# Patient Record
Sex: FEMALE | Race: WHITE | NOT HISPANIC OR LATINO | Employment: OTHER | URBAN - NONMETROPOLITAN AREA
[De-identification: names, ages, dates, MRNs, and addresses within clinical notes are randomized per-mention and may not be internally consistent; named-entity substitution may affect disease eponyms.]

---

## 2023-03-13 ENCOUNTER — OFFICE VISIT (OUTPATIENT)
Dept: PRIMARY CARE | Facility: CLINIC | Age: 78
End: 2023-03-13
Payer: MEDICARE

## 2023-03-13 VITALS
RESPIRATION RATE: 26 BRPM | HEART RATE: 72 BPM | HEIGHT: 63 IN | WEIGHT: 131.6 LBS | BODY MASS INDEX: 23.32 KG/M2 | DIASTOLIC BLOOD PRESSURE: 70 MMHG | OXYGEN SATURATION: 98 % | SYSTOLIC BLOOD PRESSURE: 136 MMHG

## 2023-03-13 DIAGNOSIS — M54.12 CERVICAL RADICULOPATHY: Primary | ICD-10-CM

## 2023-03-13 DIAGNOSIS — R53.83 OTHER FATIGUE: ICD-10-CM

## 2023-03-13 DIAGNOSIS — E78.5 DYSLIPIDEMIA: ICD-10-CM

## 2023-03-13 DIAGNOSIS — Z13.9 SCREENING FOR CONDITION: ICD-10-CM

## 2023-03-13 DIAGNOSIS — K21.9 GASTROESOPHAGEAL REFLUX DISEASE, UNSPECIFIED WHETHER ESOPHAGITIS PRESENT: ICD-10-CM

## 2023-03-13 DIAGNOSIS — E55.9 VITAMIN D DEFICIENCY: ICD-10-CM

## 2023-03-13 DIAGNOSIS — J45.20 MILD INTERMITTENT ASTHMA WITHOUT COMPLICATION (HHS-HCC): ICD-10-CM

## 2023-03-13 DIAGNOSIS — D51.0 PERNICIOUS ANEMIA: ICD-10-CM

## 2023-03-13 DIAGNOSIS — E11.9 TYPE 2 DIABETES MELLITUS WITHOUT COMPLICATION, WITHOUT LONG-TERM CURRENT USE OF INSULIN (MULTI): ICD-10-CM

## 2023-03-13 PROCEDURE — 1036F TOBACCO NON-USER: CPT | Performed by: FAMILY MEDICINE

## 2023-03-13 PROCEDURE — 99214 OFFICE O/P EST MOD 30 MIN: CPT | Performed by: FAMILY MEDICINE

## 2023-03-13 PROCEDURE — 1160F RVW MEDS BY RX/DR IN RCRD: CPT | Performed by: FAMILY MEDICINE

## 2023-03-13 PROCEDURE — 1159F MED LIST DOCD IN RCRD: CPT | Performed by: FAMILY MEDICINE

## 2023-03-13 RX ORDER — CLOPIDOGREL BISULFATE 75 MG/1
1 TABLET ORAL DAILY
COMMUNITY
Start: 2021-12-13 | End: 2023-03-13

## 2023-03-13 RX ORDER — CYANOCOBALAMIN 1000 UG/ML
1000 INJECTION, SOLUTION INTRAMUSCULAR; SUBCUTANEOUS
COMMUNITY
End: 2023-03-13

## 2023-03-13 RX ORDER — METOPROLOL SUCCINATE 50 MG/1
1 TABLET, EXTENDED RELEASE ORAL DAILY
COMMUNITY
End: 2023-07-26 | Stop reason: WASHOUT

## 2023-03-13 RX ORDER — BENZONATATE 200 MG/1
200 CAPSULE ORAL 3 TIMES DAILY PRN
COMMUNITY
Start: 2022-04-29 | End: 2023-03-13

## 2023-03-13 RX ORDER — LANOLIN ALCOHOL/MO/W.PET/CERES
1 CREAM (GRAM) TOPICAL DAILY
COMMUNITY
End: 2023-03-13

## 2023-03-13 RX ORDER — OMEPRAZOLE 40 MG/1
40 CAPSULE, DELAYED RELEASE ORAL
COMMUNITY
End: 2023-03-13

## 2023-03-13 RX ORDER — SERTRALINE HYDROCHLORIDE 50 MG/1
100 TABLET, FILM COATED ORAL
COMMUNITY
Start: 2020-06-18 | End: 2023-04-25

## 2023-03-13 RX ORDER — BUSPIRONE HYDROCHLORIDE 7.5 MG/1
7.5 TABLET ORAL 2 TIMES DAILY
COMMUNITY
Start: 2022-04-29 | End: 2023-07-26 | Stop reason: WASHOUT

## 2023-03-13 RX ORDER — ISOSORBIDE MONONITRATE 30 MG/1
1 TABLET, EXTENDED RELEASE ORAL DAILY
COMMUNITY
Start: 2021-12-08 | End: 2024-03-27

## 2023-03-13 RX ORDER — LOSARTAN POTASSIUM 50 MG/1
1 TABLET ORAL DAILY
COMMUNITY
End: 2023-11-09 | Stop reason: ALTCHOICE

## 2023-03-13 RX ORDER — ALBUTEROL SULFATE 90 UG/1
2 AEROSOL, METERED RESPIRATORY (INHALATION) EVERY 4 HOURS PRN
COMMUNITY
End: 2023-03-13

## 2023-03-13 RX ORDER — CYANOCOBALAMIN 1000 UG/ML
INJECTION, SOLUTION INTRAMUSCULAR; SUBCUTANEOUS
Qty: 1 ML | Refills: 1 | Status: SHIPPED | OUTPATIENT
Start: 2023-03-13 | End: 2023-04-05

## 2023-03-13 RX ORDER — NITROGLYCERIN 0.4 MG/1
0.4 TABLET SUBLINGUAL EVERY 5 MIN PRN
COMMUNITY
End: 2024-05-16 | Stop reason: SDUPTHER

## 2023-03-13 RX ORDER — MECLIZINE HYDROCHLORIDE 25 MG/1
25 TABLET ORAL 3 TIMES DAILY PRN
COMMUNITY
Start: 2022-06-13 | End: 2023-07-26 | Stop reason: WASHOUT

## 2023-03-13 RX ORDER — ALBUTEROL SULFATE 90 UG/1
2 AEROSOL, METERED RESPIRATORY (INHALATION) EVERY 4 HOURS PRN
Qty: 18 G | Refills: 0 | Status: SHIPPED | OUTPATIENT
Start: 2023-03-13 | End: 2023-07-26 | Stop reason: SDUPTHER

## 2023-03-13 RX ORDER — OMEPRAZOLE 40 MG/1
40 CAPSULE, DELAYED RELEASE ORAL
Qty: 30 CAPSULE | Refills: 1 | Status: SHIPPED | OUTPATIENT
Start: 2023-03-13 | End: 2023-07-26 | Stop reason: WASHOUT

## 2023-03-13 RX ORDER — HYDRALAZINE HYDROCHLORIDE 50 MG/1
50 TABLET, FILM COATED ORAL 2 TIMES DAILY
COMMUNITY
Start: 2023-02-28 | End: 2023-12-18

## 2023-03-13 RX ORDER — EZETIMIBE 10 MG/1
10 TABLET ORAL DAILY
COMMUNITY
End: 2023-07-26 | Stop reason: WASHOUT

## 2023-03-13 RX ORDER — HYDROCHLOROTHIAZIDE 25 MG/1
1 TABLET ORAL DAILY
COMMUNITY
End: 2023-07-26 | Stop reason: WASHOUT

## 2023-03-13 RX ORDER — EVOLOCUMAB 140 MG/ML
140 INJECTION, SOLUTION SUBCUTANEOUS
COMMUNITY
Start: 2020-08-28 | End: 2023-11-09

## 2023-03-13 RX ORDER — HYDROXYZINE HYDROCHLORIDE 10 MG/1
10 TABLET, FILM COATED ORAL 4 TIMES DAILY
COMMUNITY
Start: 2022-12-19 | End: 2023-07-26 | Stop reason: WASHOUT

## 2023-03-13 RX ORDER — OMEPRAZOLE 40 MG/1
40 CAPSULE, DELAYED RELEASE ORAL
Qty: 30 CAPSULE | Refills: 1 | Status: SHIPPED | OUTPATIENT
Start: 2023-03-13 | End: 2023-03-13 | Stop reason: SDUPTHER

## 2023-03-13 RX ORDER — METFORMIN HYDROCHLORIDE 500 MG/1
500 TABLET ORAL
COMMUNITY
Start: 2017-06-29 | End: 2023-05-16 | Stop reason: SDUPTHER

## 2023-03-13 RX ORDER — GLIMEPIRIDE 2 MG/1
2 TABLET ORAL 2 TIMES DAILY
COMMUNITY
End: 2023-07-26 | Stop reason: WASHOUT

## 2023-03-13 RX ORDER — ALBUTEROL SULFATE 0.63 MG/3ML
3 SOLUTION RESPIRATORY (INHALATION) EVERY 6 HOURS PRN
COMMUNITY
Start: 2020-10-06 | End: 2023-03-13

## 2023-03-13 RX ORDER — CYANOCOBALAMIN 1000 UG/ML
INJECTION, SOLUTION INTRAMUSCULAR; SUBCUTANEOUS
Qty: 1 ML | Refills: 1 | Status: SHIPPED | OUTPATIENT
Start: 2023-03-13 | End: 2023-03-13 | Stop reason: SDUPTHER

## 2023-03-13 RX ORDER — METOPROLOL SUCCINATE 100 MG/1
1 TABLET, EXTENDED RELEASE ORAL DAILY
COMMUNITY
Start: 2017-10-06 | End: 2023-12-18

## 2023-03-13 RX ORDER — AMLODIPINE BESYLATE 5 MG/1
5 TABLET ORAL DAILY
COMMUNITY
Start: 2023-03-07 | End: 2023-12-18

## 2023-03-13 RX ORDER — ACETAMINOPHEN 325 MG/1
325 TABLET ORAL EVERY 8 HOURS PRN
COMMUNITY
Start: 2020-10-31 | End: 2023-11-09 | Stop reason: ALTCHOICE

## 2023-03-13 RX ORDER — SYRINGE WITH NEEDLE, 1 ML 25GX5/8"
SYRINGE, EMPTY DISPOSABLE MISCELLANEOUS
COMMUNITY
Start: 2022-10-10

## 2023-03-13 RX ORDER — ERGOCALCIFEROL 1.25 MG/1
1 CAPSULE ORAL
COMMUNITY
End: 2023-07-26 | Stop reason: SDUPTHER

## 2023-03-13 RX ORDER — TRAMADOL HYDROCHLORIDE 50 MG/1
50 TABLET ORAL EVERY 6 HOURS PRN
COMMUNITY
Start: 2022-12-19 | End: 2023-07-26 | Stop reason: WASHOUT

## 2023-03-13 ASSESSMENT — PAIN SCALES - GENERAL: PAINLEVEL: 4

## 2023-03-13 NOTE — PROGRESS NOTES
"Subjective     Janine Hendrix is a 77 y.o. female who presents for Follow-up (Follow up meds/rash).  Today she is accompanied by accompanied by her daughter.     HPI  The patient is a 77 year-old female presenting to the clinic for rash and to follow up on medication.    Complaining pain in the neck on pain scale 8-9/10.  Squeezing throbbing pain.  Over-the-counter medications not helping.  Feeling sensation in the arms.  Tingling numbness sensation going into the arms from the neck.  Denies trauma.  Denies injury.  Denies blurred vision.  Educated on type 2 diabetes and diet exercise.  Complaining feeling tired.  Advised on diet exercise.  Educated on dyslipidemia and diet exercise.  Educated on vitamin D deficiency we will continue monitor.  Educated on asthma care and prevention treatment and management.        Review of Systems  Review of systems  General.  Denies fever.  Denies chills.  HEENT denies nasal congestion.  Denies sinus pressure.  Respiratory.  Denies cough.  Denies shortness of breath.  Neck.  Complain neck pain.  Cardiovascular.  Denies chest pain.  Denies heart palpitations.  Denies shortness of breath.    Gastrointestinal.  Denies nausea vomiting diarrhea.  Denies abdominal pain.    Genitourinary denies burning urination.  Denies frequent urination.  Denies flank pain.  Denies blood in the urine.  Denies abnormal vaginal discharge.    Neurology.  Dictated as above.  Musculoskeletal.  Denies body aches.  Denies joint pains.  Denies muscle aches.  Denies muscle weakness    Endocrinology.  Denies cold intolerance.  Denies hot intolerance.    Psychiatric.  Denies depression.  Denies anxiety.  Denies suicidal.  Denies homicidal.          Objective   /70 (BP Location: Right arm, Patient Position: Sitting, BP Cuff Size: Adult)   Pulse 72   Resp 26   Ht 1.6 m (5' 3\")   Wt 59.7 kg (131 lb 9.6 oz)   SpO2 98%   BMI 23.31 kg/m²        Physical Exam  General.  Not in distress.  HEENT " normocephalic anicteric sclerae.  Neck soft supple no thyromegaly.  No carotid bruit.  Tenderness noted on lower cervical paraspinal muscular area.         Lungs are clear.  Heart regular.  Abdomen soft nontender nondistended bowel sounds are positive.  Extremities no clubbing cyanosis or edema.  Psychiatric.  Has good eye contact.  No crying spells noted.  Speech was normal.  Denies depression.  Denies suicidal.  Denies homicidal.  Neurological.  Complaining tingling numbness and shocklike sensation in the arms  Foot exam.  Bilateral foot exam.  No rashes noted.  No lesions noted.  No ulcers noted.  No onychomycosis noted.  Sensation was intact with a monofilament.  Bilateral posterior tibial and dorsalis pedis arteries are palpable     Assessment/Plan     1.  Cervical radiculopathy.  Educated on neck exercises.  Recommended MRI.    2.  Type 2 diabetes.  Educated on diet exercise.  Advised to check blood sugars at directed.  Recommend eye exam once a year.  Educated on diabetic foot care.    3.  Fatigue.  Educated on diet exercise.      4.  Dyslipidemia.  Educated on diet exercise.  We will continue monitor.    5.  Vitamin D deficiency.  Educated on vitamin D deficiency.  We will continue monitor.    6.  Acid reflux.  Educated on acid reflux and heartburn prevention.  Denies nausea vomiting and diarrhea  7.  Pernicious anemia.  Educated on pernicious anemia.  Continue B12 injection.      8.  Asthma.  Educated on asthma care and prevention treatment and management.  Patient and patient's daughter had multiple questions and concerns    Addressed all the questions and concerns.          Problem List Items Addressed This Visit    None  Visit Diagnoses       Cervical radiculopathy    -  Primary    Relevant Orders    MR cervical spine w and wo IV contrast    Type 2 diabetes mellitus without complication, without long-term current use of insulin (CMS/[facility])        Relevant Orders    Creatinine, Serum    CBC and Auto  Differential    Hemoglobin A1C    Albumin , Urine Random    Other fatigue        Relevant Orders    Comprehensive metabolic panel    Urinalysis with Reflex Microscopic    Tsh With Reflex To Free T4 If Abnormal    Vitamin B12    Folate    Dyslipidemia        Relevant Orders    Lipid panel    Vitamin D deficiency        Relevant Orders    Vitamin D 25-Hydroxy,Total    Gastroesophageal reflux disease, unspecified whether esophagitis present        Relevant Medications    omeprazole (PriLOSEC) 40 mg DR capsule    Pernicious anemia        Relevant Medications    cyanocobalamin (Vitamin B-12) 1,000 mcg/mL injection    Screening for condition        Relevant Orders    Hepatitis C antibody    Mild intermittent asthma without complication        Relevant Medications    albuterol 90 mcg/actuation inhaler        Scribe Attestation  By signing my name below, IMadison Scribe   attest that this documentation has been prepared under the direction and in the presence of Bishnu Baron MD.

## 2023-03-20 ENCOUNTER — LAB (OUTPATIENT)
Dept: LAB | Facility: LAB | Age: 78
End: 2023-03-20
Payer: MEDICARE

## 2023-03-20 DIAGNOSIS — R53.83 OTHER FATIGUE: ICD-10-CM

## 2023-03-20 DIAGNOSIS — E11.9 TYPE 2 DIABETES MELLITUS WITHOUT COMPLICATION, WITHOUT LONG-TERM CURRENT USE OF INSULIN (MULTI): ICD-10-CM

## 2023-03-20 DIAGNOSIS — Z13.9 SCREENING FOR CONDITION: ICD-10-CM

## 2023-03-20 DIAGNOSIS — E78.5 DYSLIPIDEMIA: ICD-10-CM

## 2023-03-20 DIAGNOSIS — E55.9 VITAMIN D DEFICIENCY: ICD-10-CM

## 2023-03-20 LAB
ALANINE AMINOTRANSFERASE (SGPT) (U/L) IN SER/PLAS: 17 U/L (ref 7–45)
ALBUMIN (G/DL) IN SER/PLAS: 4.4 G/DL (ref 3.4–5)
ALKALINE PHOSPHATASE (U/L) IN SER/PLAS: 62 U/L (ref 33–136)
ANION GAP IN SER/PLAS: 15 MMOL/L (ref 10–20)
APPEARANCE, URINE: ABNORMAL
ASPARTATE AMINOTRANSFERASE (SGOT) (U/L) IN SER/PLAS: 21 U/L (ref 9–39)
BACTERIA, URINE: ABNORMAL /HPF
BASOPHILS (10*3/UL) IN BLOOD BY AUTOMATED COUNT: 0.05 X10E9/L (ref 0–0.1)
BASOPHILS/100 LEUKOCYTES IN BLOOD BY AUTOMATED COUNT: 1 % (ref 0–2)
BILIRUBIN TOTAL (MG/DL) IN SER/PLAS: 0.5 MG/DL (ref 0–1.2)
BILIRUBIN, URINE: NEGATIVE
BLOOD, URINE: NEGATIVE
BUDDING YEAST, URINE: PRESENT /HPF
CALCIUM (MG/DL) IN SER/PLAS: 9.1 MG/DL (ref 8.6–10.3)
CARBON DIOXIDE, TOTAL (MMOL/L) IN SER/PLAS: 27 MMOL/L (ref 21–32)
CHLORIDE (MMOL/L) IN SER/PLAS: 100 MMOL/L (ref 98–107)
CHOLESTEROL (MG/DL) IN SER/PLAS: 149 MG/DL (ref 0–199)
CHOLESTEROL IN HDL (MG/DL) IN SER/PLAS: 68.4 MG/DL
CHOLESTEROL/HDL RATIO: 2.2
COLOR, URINE: ABNORMAL
CREATININE (MG/DL) IN SER/PLAS: 0.95 MG/DL (ref 0.5–1.05)
EOSINOPHILS (10*3/UL) IN BLOOD BY AUTOMATED COUNT: 0.26 X10E9/L (ref 0–0.4)
EOSINOPHILS/100 LEUKOCYTES IN BLOOD BY AUTOMATED COUNT: 5 % (ref 0–6)
ERYTHROCYTE DISTRIBUTION WIDTH (RATIO) BY AUTOMATED COUNT: 12.7 % (ref 11.5–14.5)
ERYTHROCYTE MEAN CORPUSCULAR HEMOGLOBIN CONCENTRATION (G/DL) BY AUTOMATED: 33.2 G/DL (ref 32–36)
ERYTHROCYTE MEAN CORPUSCULAR VOLUME (FL) BY AUTOMATED COUNT: 90 FL (ref 80–100)
ERYTHROCYTES (10*6/UL) IN BLOOD BY AUTOMATED COUNT: 4.65 X10E12/L (ref 4–5.2)
GFR FEMALE: 62 ML/MIN/1.73M2
GLUCOSE (MG/DL) IN SER/PLAS: 148 MG/DL (ref 74–99)
GLUCOSE, URINE: NEGATIVE MG/DL
HEMATOCRIT (%) IN BLOOD BY AUTOMATED COUNT: 41.9 % (ref 36–46)
HEMOGLOBIN (G/DL) IN BLOOD: 13.9 G/DL (ref 12–16)
IMMATURE GRANULOCYTES/100 LEUKOCYTES IN BLOOD BY AUTOMATED COUNT: 0.2 % (ref 0–0.9)
KETONES, URINE: NEGATIVE MG/DL
LDL: 48 MG/DL (ref 0–99)
LEUKOCYTE ESTERASE, URINE: ABNORMAL
LEUKOCYTES (10*3/UL) IN BLOOD BY AUTOMATED COUNT: 5.2 X10E9/L (ref 4.4–11.3)
LYMPHOCYTES (10*3/UL) IN BLOOD BY AUTOMATED COUNT: 1.88 X10E9/L (ref 0.8–3)
LYMPHOCYTES/100 LEUKOCYTES IN BLOOD BY AUTOMATED COUNT: 36.3 % (ref 13–44)
MONOCYTES (10*3/UL) IN BLOOD BY AUTOMATED COUNT: 0.37 X10E9/L (ref 0.05–0.8)
MONOCYTES/100 LEUKOCYTES IN BLOOD BY AUTOMATED COUNT: 7.1 % (ref 2–10)
MUCUS, URINE: ABNORMAL /LPF
NEUTROPHILS (10*3/UL) IN BLOOD BY AUTOMATED COUNT: 2.61 X10E9/L (ref 1.6–5.5)
NEUTROPHILS/100 LEUKOCYTES IN BLOOD BY AUTOMATED COUNT: 50.4 % (ref 40–80)
NITRITE, URINE: NEGATIVE
PH, URINE: 5 (ref 5–8)
PLATELETS (10*3/UL) IN BLOOD AUTOMATED COUNT: 185 X10E9/L (ref 150–450)
POTASSIUM (MMOL/L) IN SER/PLAS: 4.2 MMOL/L (ref 3.5–5.3)
PROTEIN TOTAL: 6.6 G/DL (ref 6.4–8.2)
PROTEIN, URINE: NEGATIVE MG/DL
RBC, URINE: 1 /HPF (ref 0–5)
SODIUM (MMOL/L) IN SER/PLAS: 138 MMOL/L (ref 136–145)
SPECIFIC GRAVITY, URINE: 1.02 (ref 1–1.03)
SQUAMOUS EPITHELIAL CELLS, URINE: 12 /HPF
THYROTROPIN (MIU/L) IN SER/PLAS BY DETECTION LIMIT <= 0.05 MIU/L: 2.28 MIU/L (ref 0.44–3.98)
TRIGLYCERIDE (MG/DL) IN SER/PLAS: 164 MG/DL (ref 0–149)
UREA NITROGEN (MG/DL) IN SER/PLAS: 28 MG/DL (ref 6–23)
UROBILINOGEN, URINE: <2 MG/DL (ref 0–1.9)
VLDL: 33 MG/DL (ref 0–40)
WBC, URINE: 4 /HPF (ref 0–5)

## 2023-03-20 PROCEDURE — 80053 COMPREHEN METABOLIC PANEL: CPT

## 2023-03-20 PROCEDURE — 81001 URINALYSIS AUTO W/SCOPE: CPT

## 2023-03-20 PROCEDURE — 82607 VITAMIN B-12: CPT

## 2023-03-20 PROCEDURE — 82746 ASSAY OF FOLIC ACID SERUM: CPT

## 2023-03-20 PROCEDURE — 86803 HEPATITIS C AB TEST: CPT

## 2023-03-20 PROCEDURE — 85025 COMPLETE CBC W/AUTO DIFF WBC: CPT

## 2023-03-20 PROCEDURE — 84443 ASSAY THYROID STIM HORMONE: CPT

## 2023-03-20 PROCEDURE — 36415 COLL VENOUS BLD VENIPUNCTURE: CPT

## 2023-03-20 PROCEDURE — 83036 HEMOGLOBIN GLYCOSYLATED A1C: CPT

## 2023-03-20 PROCEDURE — 82306 VITAMIN D 25 HYDROXY: CPT

## 2023-03-20 PROCEDURE — 80061 LIPID PANEL: CPT

## 2023-03-21 LAB
CALCIDIOL (25 OH VITAMIN D3) (NG/ML) IN SER/PLAS: 44 NG/ML
COBALAMIN (VITAMIN B12) (PG/ML) IN SER/PLAS: 864 PG/ML (ref 211–911)
ESTIMATED AVERAGE GLUCOSE FOR HBA1C: 171 MG/DL
FOLATE (NG/ML) IN SER/PLAS: 21.1 NG/ML
HEMOGLOBIN A1C/HEMOGLOBIN TOTAL IN BLOOD: 7.6 %
HEPATITIS C VIRUS AB PRESENCE IN SERUM: NONREACTIVE

## 2023-03-25 ENCOUNTER — TELEPHONE (OUTPATIENT)
Dept: PRIMARY CARE | Facility: CLINIC | Age: 78
End: 2023-03-25
Payer: MEDICARE

## 2023-03-25 DIAGNOSIS — M54.2 NECK PAIN: Primary | ICD-10-CM

## 2023-03-25 RX ORDER — METHYLPREDNISOLONE 4 MG/1
TABLET ORAL
Qty: 21 TABLET | Refills: 0 | Status: SHIPPED | OUTPATIENT
Start: 2023-03-25 | End: 2023-04-01

## 2023-03-25 NOTE — TELEPHONE ENCOUNTER
Discussed the patient and patient's daughter.  Still experiencing pain in the neck.  Reviewed MRI results.  Recommended neurosurgery consultation.  Discussed Medrol Dosepak and sent prescription.  Extreme caution advised taking Medrol Dosepak and elevated blood sugars.  Advised to go to the emergency room as soon as possible if the blood sugars goes up to 400 and over.  Patient and patient daughter understood verbalized understanding and agreed

## 2023-04-04 PROBLEM — D49.2 ABNORMAL SKIN GROWTH: Status: ACTIVE | Noted: 2023-04-04

## 2023-04-04 PROBLEM — R09.89 SENSATION, CHOKING: Status: ACTIVE | Noted: 2023-04-04

## 2023-04-04 PROBLEM — R53.83 OTHER FATIGUE: Status: ACTIVE | Noted: 2023-04-04

## 2023-04-04 PROBLEM — R79.89 ELEVATED TSH: Status: ACTIVE | Noted: 2023-04-04

## 2023-04-04 PROBLEM — E55.9 VITAMIN D DEFICIENCY: Status: ACTIVE | Noted: 2023-04-04

## 2023-04-04 PROBLEM — S75.001A INJURY OF RIGHT COMMON FEMORAL ARTERY: Status: ACTIVE | Noted: 2023-04-04

## 2023-04-04 PROBLEM — M25.552 LEFT HIP PAIN: Status: ACTIVE | Noted: 2023-04-04

## 2023-04-04 PROBLEM — I73.9 RIGHT LEG CLAUDICATION (CMS-HCC): Status: ACTIVE | Noted: 2023-04-04

## 2023-04-04 PROBLEM — I65.23 BILATERAL CAROTID ARTERY STENOSIS: Status: ACTIVE | Noted: 2023-04-04

## 2023-04-04 PROBLEM — K22.5 ZENKER'S DIVERTICULUM: Status: ACTIVE | Noted: 2023-04-04

## 2023-04-04 PROBLEM — E87.6 HYPOKALEMIA: Status: ACTIVE | Noted: 2023-04-04

## 2023-04-04 PROBLEM — R13.12 OROPHARYNGEAL DYSPHAGIA: Status: ACTIVE | Noted: 2023-04-04

## 2023-04-04 PROBLEM — R20.0 NUMBNESS: Status: ACTIVE | Noted: 2023-04-04

## 2023-04-04 PROBLEM — M54.50 CHRONIC LOW BACK PAIN: Status: ACTIVE | Noted: 2023-04-04

## 2023-04-04 PROBLEM — D51.0 PERNICIOUS ANEMIA: Status: ACTIVE | Noted: 2023-04-04

## 2023-04-04 PROBLEM — F41.0 PANIC ATTACKS: Status: ACTIVE | Noted: 2023-04-04

## 2023-04-04 PROBLEM — D64.9 ANEMIA: Status: ACTIVE | Noted: 2023-04-04

## 2023-04-04 PROBLEM — I73.9 PAD (PERIPHERAL ARTERY DISEASE) (CMS-HCC): Status: ACTIVE | Noted: 2023-04-04

## 2023-04-04 PROBLEM — G89.29 CHRONIC RIGHT HIP PAIN: Status: ACTIVE | Noted: 2023-04-04

## 2023-04-04 PROBLEM — I25.10 CORONARY ARTERY DISEASE INVOLVING NATIVE CORONARY ARTERY OF NATIVE HEART WITHOUT ANGINA PECTORIS: Status: ACTIVE | Noted: 2023-04-04

## 2023-04-04 PROBLEM — M79.602 PAIN OF LEFT UPPER EXTREMITY: Status: ACTIVE | Noted: 2023-04-04

## 2023-04-04 PROBLEM — M25.551 CHRONIC RIGHT HIP PAIN: Status: ACTIVE | Noted: 2023-04-04

## 2023-04-04 PROBLEM — K21.9 GERD (GASTROESOPHAGEAL REFLUX DISEASE): Status: ACTIVE | Noted: 2023-04-04

## 2023-04-04 PROBLEM — R53.82 CHRONIC FATIGUE AND MALAISE: Status: ACTIVE | Noted: 2023-04-04

## 2023-04-04 PROBLEM — E66.3 OVERWEIGHT: Status: ACTIVE | Noted: 2023-04-04

## 2023-04-04 PROBLEM — C44.92 SCC (SQUAMOUS CELL CARCINOMA): Status: ACTIVE | Noted: 2023-04-04

## 2023-04-04 PROBLEM — I74.5: Status: ACTIVE | Noted: 2023-04-04

## 2023-04-04 PROBLEM — J30.9 ALLERGIC RHINITIS: Status: ACTIVE | Noted: 2023-04-04

## 2023-04-04 PROBLEM — F03.90 DEMENTIA WITHOUT BEHAVIORAL DISTURBANCE (MULTI): Status: ACTIVE | Noted: 2023-04-04

## 2023-04-04 PROBLEM — L98.9 CHANGING SKIN LESION: Status: ACTIVE | Noted: 2023-04-04

## 2023-04-04 PROBLEM — M25.551 HIP PAIN, RIGHT: Status: ACTIVE | Noted: 2023-04-04

## 2023-04-04 PROBLEM — I10 ESSENTIAL HYPERTENSION: Status: ACTIVE | Noted: 2023-04-04

## 2023-04-04 PROBLEM — T17.208A OROPHARYNGEAL ASPIRATION: Status: ACTIVE | Noted: 2023-04-04

## 2023-04-04 PROBLEM — E11.9 TYPE 2 DIABETES MELLITUS WITHOUT COMPLICATION, WITHOUT LONG-TERM CURRENT USE OF INSULIN (MULTI): Status: ACTIVE | Noted: 2023-04-04

## 2023-04-04 PROBLEM — E11.9 DM2 (DIABETES MELLITUS, TYPE 2) (MULTI): Status: ACTIVE | Noted: 2023-04-04

## 2023-04-04 PROBLEM — I97.88: Status: ACTIVE | Noted: 2023-04-04

## 2023-04-04 PROBLEM — G89.29 CHRONIC LOW BACK PAIN: Status: ACTIVE | Noted: 2023-04-04

## 2023-04-04 PROBLEM — M79.601 PAIN OF RIGHT UPPER EXTREMITY: Status: ACTIVE | Noted: 2023-04-04

## 2023-04-04 PROBLEM — R13.13 DYSPHAGIA, CRICOPHARYNGEAL: Status: ACTIVE | Noted: 2023-04-04

## 2023-04-04 PROBLEM — F41.8 ANXIETY WITH DEPRESSION: Status: ACTIVE | Noted: 2023-04-04

## 2023-04-04 PROBLEM — H81.10 BPPV (BENIGN PAROXYSMAL POSITIONAL VERTIGO): Status: ACTIVE | Noted: 2023-04-04

## 2023-04-04 PROBLEM — Z90.12 HISTORY OF TOTAL MASTECTOMY OF LEFT BREAST: Status: ACTIVE | Noted: 2023-04-04

## 2023-04-04 PROBLEM — R53.81 CHRONIC FATIGUE AND MALAISE: Status: ACTIVE | Noted: 2023-04-04

## 2023-04-04 PROBLEM — Z95.5 HISTORY OF HEART ARTERY STENT: Status: ACTIVE | Noted: 2023-04-04

## 2023-04-04 PROBLEM — G47.00 INSOMNIA: Status: ACTIVE | Noted: 2023-04-04

## 2023-04-04 PROBLEM — M54.2 NECK PAIN: Status: ACTIVE | Noted: 2023-04-04

## 2023-04-04 PROBLEM — E78.5 DYSLIPIDEMIA: Status: ACTIVE | Noted: 2023-04-04

## 2023-04-04 PROBLEM — J69.0 ASPIRATION PNEUMONIA (MULTI): Status: ACTIVE | Noted: 2023-04-04

## 2023-04-04 PROBLEM — E83.42 HYPOMAGNESEMIA: Status: ACTIVE | Noted: 2023-04-04

## 2023-04-04 PROBLEM — M54.50 LOW BACK PAIN: Status: ACTIVE | Noted: 2023-04-04

## 2023-04-04 PROBLEM — R07.9 CHEST PAIN: Status: ACTIVE | Noted: 2023-04-04

## 2023-04-04 RX ORDER — SYRINGE, DISPOSABLE, 1 ML
SYRINGE, EMPTY DISPOSABLE MISCELLANEOUS
COMMUNITY

## 2023-04-04 RX ORDER — ASPIRIN 81 MG/1
81 TABLET ORAL DAILY
COMMUNITY

## 2023-04-05 ENCOUNTER — OFFICE VISIT (OUTPATIENT)
Dept: PRIMARY CARE | Facility: CLINIC | Age: 78
End: 2023-04-05
Payer: MEDICARE

## 2023-04-05 VITALS
SYSTOLIC BLOOD PRESSURE: 114 MMHG | DIASTOLIC BLOOD PRESSURE: 67 MMHG | HEIGHT: 63 IN | OXYGEN SATURATION: 99 % | HEART RATE: 84 BPM | WEIGHT: 126.4 LBS | BODY MASS INDEX: 22.39 KG/M2

## 2023-04-05 DIAGNOSIS — F41.0 PANIC ATTACKS: ICD-10-CM

## 2023-04-05 DIAGNOSIS — M25.59 PAIN IN OTHER JOINT: ICD-10-CM

## 2023-04-05 DIAGNOSIS — I83.813 VARICOSE VEINS OF BOTH LOWER EXTREMITIES WITH PAIN: ICD-10-CM

## 2023-04-05 DIAGNOSIS — M48.02 SPINAL STENOSIS OF CERVICAL REGION: ICD-10-CM

## 2023-04-05 DIAGNOSIS — E11.9 TYPE 2 DIABETES MELLITUS WITHOUT COMPLICATION, WITHOUT LONG-TERM CURRENT USE OF INSULIN (MULTI): ICD-10-CM

## 2023-04-05 DIAGNOSIS — I10 ESSENTIAL HYPERTENSION: ICD-10-CM

## 2023-04-05 DIAGNOSIS — E78.5 DYSLIPIDEMIA: ICD-10-CM

## 2023-04-05 DIAGNOSIS — E55.9 VITAMIN D DEFICIENCY: ICD-10-CM

## 2023-04-05 DIAGNOSIS — Z78.0 POSTMENOPAUSAL: ICD-10-CM

## 2023-04-05 DIAGNOSIS — M54.2 NECK PAIN: Primary | ICD-10-CM

## 2023-04-05 DIAGNOSIS — R53.82 CHRONIC FATIGUE AND MALAISE: ICD-10-CM

## 2023-04-05 DIAGNOSIS — R53.83 OTHER FATIGUE: ICD-10-CM

## 2023-04-05 DIAGNOSIS — R53.81 CHRONIC FATIGUE AND MALAISE: ICD-10-CM

## 2023-04-05 DIAGNOSIS — D51.0 PERNICIOUS ANEMIA: ICD-10-CM

## 2023-04-05 PROCEDURE — 3078F DIAST BP <80 MM HG: CPT | Performed by: FAMILY MEDICINE

## 2023-04-05 PROCEDURE — 1159F MED LIST DOCD IN RCRD: CPT | Performed by: FAMILY MEDICINE

## 2023-04-05 PROCEDURE — 3074F SYST BP LT 130 MM HG: CPT | Performed by: FAMILY MEDICINE

## 2023-04-05 PROCEDURE — 1160F RVW MEDS BY RX/DR IN RCRD: CPT | Performed by: FAMILY MEDICINE

## 2023-04-05 PROCEDURE — 99214 OFFICE O/P EST MOD 30 MIN: CPT | Performed by: FAMILY MEDICINE

## 2023-04-05 PROCEDURE — 1036F TOBACCO NON-USER: CPT | Performed by: FAMILY MEDICINE

## 2023-04-05 ASSESSMENT — PAIN SCALES - GENERAL: PAINLEVEL: 0-NO PAIN

## 2023-04-05 NOTE — PROGRESS NOTES
Subjective     Janine Hendrix is a 77 y.o. female who presents for Follow-up (Follow up meds/results).       HPI  The patient is a 77 year-old female presenting to the clinic for follow up on lab results. I have reviewed results from her most recent blood work with her. Discussed diet and exercise.     Discussed blood sugar levels.  She is up to date with Opthalmology examinations.     She sees Dr. Donovan for Dermatology.   Order placed today for DEXA bone density scan.    Referral placed for Neurosurgery today 4/5/2023.  Follow up with Pain management.    Follow up with Neurosurgery prior to continuation of Physical Therapy.   Component      Latest Ref Rng 3/20/2023   WBC      4.4 - 11.3 x10E9/L 5.2    RBC      4.00 - 5.20 x10E12/L 4.65    HEMOGLOBIN      12.0 - 16.0 g/dL 13.9    HEMATOCRIT      36.0 - 46.0 % 41.9    MCV      80 - 100 fL 90    MCHC      32.0 - 36.0 g/dL 33.2    Platelets      150 - 450 x10E9/L 185    RED CELL DISTRIBUTION WIDTH      11.5 - 14.5 % 12.7    Neutrophils %      40.0 - 80.0 % 50.4    Immature Granulocytes %, Automated      0.0 - 0.9 % 0.2    Lymphocytes %      13.0 - 44.0 % 36.3    Monocytes %      2.0 - 10.0 % 7.1    Eosinophils %      0.0 - 6.0 % 5.0    Basophils %      0.0 - 2.0 % 1.0    Neutrophils Absolute      1.60 - 5.50 x10E9/L 2.61    Lymphocytes Absolute      0.80 - 3.00 x10E9/L 1.88    Monocytes Absolute      0.05 - 0.80 x10E9/L 0.37    Eosinophils Absolute      0.00 - 0.40 x10E9/L 0.26    Basophils Absolute      0.00 - 0.10 x10E9/L 0.05    GLUCOSE      74 - 99 mg/dL 148 (H)    SODIUM      136 - 145 mmol/L 138    POTASSIUM      3.5 - 5.3 mmol/L 4.2    CHLORIDE      98 - 107 mmol/L 100    Bicarbonate      21 - 32 mmol/L 27    Anion Gap      10 - 20 mmol/L 15    Blood Urea Nitrogen      6 - 23 mg/dL 28 (H)    Creatinine      0.50 - 1.05 mg/dL 0.95    GFR Female      >90 mL/min/1.73m2 62    Calcium      8.6 - 10.3 mg/dL 9.1    Albumin      3.4 - 5.0 g/dL 4.4    Alkaline  Phosphatase      33 - 136 U/L 62    Total Protein      6.4 - 8.2 g/dL 6.6    AST      9 - 39 U/L 21    Bilirubin Total      0.0 - 1.2 mg/dL 0.5    ALT      7 - 45 U/L 17    Color, Urine      STRAW,YELLOW  DONNIE    Appearance, Urine      CLEAR  HAZY    Specific Gravity, Urine      1.005 - 1.035  1.020    pH, Urine      5.0 - 8.0  5.0    Protein, Urine      NEGATIVE mg/dL NEGATIVE    Glucose, Urine      NEGATIVE mg/dL NEGATIVE    Blood, Urine      NEGATIVE  NEGATIVE    Ketones, Urine      NEGATIVE mg/dL NEGATIVE    Bilirubin, Urine      NEGATIVE  NEGATIVE    Urobilinogen, Urine      0.0 - 1.9 mg/dL <2.0    Nitrite, Urine      NEGATIVE  NEGATIVE    Leukocyte Esterase, Urine      NEGATIVE  MODERATE(2+) !    CHOLESTEROL      0 - 199 mg/dL 149    HDL CHOLESTEROL      mg/dL 68.4    Cholesterol/HDL Ratio 2.2    LDL      0 - 99 mg/dL 48    VLDL      0 - 40 mg/dL 33    TRIGLYCERIDES      0 - 149 mg/dL 164 (H)    WBC, Urine      0 - 5 /HPF 4    RBC, Urine      0 - 5 /HPF 1    Squamous Epithelial Cells, Urine      /HPF 12    Bacteria, Urine      /HPF 1+ !    Budding Yeast, Urine      /HPF PRESENT !    Mucus, Urine      /LPF FEW    Hemoglobin A1C      % 7.6 !    Estimated Average Glucose      MG/    Thyroid Stimulating Hormone      0.44 - 3.98 mIU/L 2.28    Vitamin B12      211 - 911 pg/mL 864    Vitamin D, 25-Hydroxy, Total      ng/mL 44    FOLATE      >5.0 ng/mL 21.1    Hepatitis C AB      NONREACTIVE  NONREACTIVE     IMPRESSION:  1. Multilevel degenerative disc disease and facet arthrosis with  varying degrees of mild spinal canal stenosis. There is moderate  neural foraminal stenosis at C3-C4, C4-C5 and C5-C6. No significant  interval change from the prior exam 05/27/2021.  Legend:  (H) High  ! Abnormal  Complain neck pain.  On pain scale 7-8/10.  Squeezing throbbing pain.  I have reviewed MRI results.  Aggravating factors.  Lifting and physical activity.  Relieving factors.  Rest.  I have reviewed MRI results.   "Discussed about spinal stenosis.  Recommend and defer to the neurosurgeon.    Educated on postmenopausal care and symptoms and management.  History of joint pains and muscle aches.  Educated on dyslipidemia and exercise.  Complaining feeling tired but advised on diet exercise.  Educated on type 2 diabetes and diet exercise.  Educated on vitamin D deficiency.  Educated on pernicious anemia and advised to increase dietary vitamin B12 intake.  History of panic attacks.  Continue current medication.  Educated on hypertension low-salt diet exercise.  Complaining pain in both legs and has varicose veins.  Had been to the vascular surgeon in the past.          Review of Systems  Review of systems    General.  Denies fever.  Denies chills.    HEENT denies nasal congestion.  Denies sinus pressure.    Respiratory.  Denies cough.  Denies shortness of breath.    Cardiovascular.  Denies chest pain.  Denies heart palpitations.  Denies shortness of breath.    Gastrointestinal.  Denies nausea vomiting diarrhea.  Denies abdominal pain.    Genitourinary denies burning urination.  Denies frequent urination.  Denies flank pain.  Denies blood in the urine.  Denies abnormal vaginal discharge.    Neurology.  Dictated as above.      Musculoskeletal.  Dictated as above.      Endocrinology.  Denies cold intolerance.  Denies hot intolerance.    Psychiatric.  Denies depression.  Denies anxiety.  Denies suicidal.  Denies homicidal.      Objective   /67 (BP Location: Left arm, Patient Position: Sitting, BP Cuff Size: Adult)   Pulse 84   Ht 1.6 m (5' 3\")   Wt 57.3 kg (126 lb 6.4 oz)   SpO2 99%   BMI 22.39 kg/m²        Physical Exam  General.  Not in distress.  HEENT normocephalic anicteric sclerae.  Neck soft supple no thyromegaly.  No carotid bruit.  Tenderness noted on the lower cervical paraspinal muscular area.  Lungs are clear.  Heart regular.  Abdomen soft nontender nondistended bowel sounds are positive.  Extremities no clubbing " cyanosis or edema.  Psychiatric.  Has good eye contact.  No crying spells noted.  Speech was normal.  Denies depression.  Denies suicidal.  Denies homicidal.  Foot exam.  Bilateral foot exam.  No rashes noted.  No lesions noted.  No ulcers noted.  No onychomycosis noted.  Sensation was intact with a monofilament.  Bilateral posterior tibial and dorsalis pedis arteries are palpable     Vascular.  Varicose veins noted on both lower legs.      Assessment/Plan   1.  Neck pain.  Educated on neck exercises.  Keep appointment with the specialist.    2.  Spinal stenosis.  Dictated as above  3.  Postmenopausal dictated as above.    4.  Joint pain.  Dictated as appropriate    5.  Dyslipidemia.  Educated on diet exercise.  We will continue monitor    6.  Fatigue.  Educated on diet exercise.  7.  Type 2 diabetes.  Educated on diet exercise.  Uncontrolled.  Check blood sugars twice daily.  Recommend eye exam once a year.  Educated on diabetic foot care.    8.  Vitamin D deficiency.  Educated on vitamin D deficiency we will continue monitor    9.  Pernicious anemia.  Dictated as above.    10.  Panic attacks.  Dictated as above.    11.  Hypertension.  Educated on low-salt and exercise.  Continue current management    12.  Fatigue.  Educated on diet exercise.  We will continue monitor    13.  Varicose veins.  Dictated as above                        Problem List Items Addressed This Visit          Nervous    Neck pain - Primary    Relevant Orders    Referral to Neurosurgery    Referral to Pain Medicine       Circulatory    Essential hypertension       Endocrine/Metabolic    Type 2 diabetes mellitus without complication, without long-term current use of insulin (CMS/[facility])    Vitamin D deficiency       Hematologic    Pernicious anemia       Other    Chronic fatigue and malaise    Dyslipidemia    Other fatigue    Panic attacks     Other Visit Diagnoses       Spinal stenosis of cervical region        Relevant Orders    Referral to  Neurosurgery    Referral to Pain Medicine    Postmenopausal        Relevant Orders    XR DEXA bone density    Pain in other joint        Relevant Orders    Referral to Pain Medicine    Varicose veins of both lower extremities with pain            Scribe Attestation  By signing my name below, I, Je Ivey   attest that this documentation has been prepared under the direction and in the presence of Bishnu Baron MD.

## 2023-04-21 LAB
ANION GAP IN SER/PLAS: 17 MMOL/L (ref 10–20)
CALCIUM (MG/DL) IN SER/PLAS: 9.4 MG/DL (ref 8.6–10.3)
CARBON DIOXIDE, TOTAL (MMOL/L) IN SER/PLAS: 27 MMOL/L (ref 21–32)
CHLORIDE (MMOL/L) IN SER/PLAS: 98 MMOL/L (ref 98–107)
CREATININE (MG/DL) IN SER/PLAS: 0.74 MG/DL (ref 0.5–1.05)
ERYTHROCYTE DISTRIBUTION WIDTH (RATIO) BY AUTOMATED COUNT: 13 % (ref 11.5–14.5)
ERYTHROCYTE MEAN CORPUSCULAR HEMOGLOBIN CONCENTRATION (G/DL) BY AUTOMATED: 32.3 G/DL (ref 32–36)
ERYTHROCYTE MEAN CORPUSCULAR VOLUME (FL) BY AUTOMATED COUNT: 90 FL (ref 80–100)
ERYTHROCYTES (10*6/UL) IN BLOOD BY AUTOMATED COUNT: 4.72 X10E12/L (ref 4–5.2)
GFR FEMALE: 83 ML/MIN/1.73M2
GLUCOSE (MG/DL) IN SER/PLAS: 221 MG/DL (ref 74–99)
HEMATOCRIT (%) IN BLOOD BY AUTOMATED COUNT: 42.7 % (ref 36–46)
HEMOGLOBIN (G/DL) IN BLOOD: 13.8 G/DL (ref 12–16)
LEUKOCYTES (10*3/UL) IN BLOOD BY AUTOMATED COUNT: 6.3 X10E9/L (ref 4.4–11.3)
PLATELETS (10*3/UL) IN BLOOD AUTOMATED COUNT: 204 X10E9/L (ref 150–450)
POTASSIUM (MMOL/L) IN SER/PLAS: 4.5 MMOL/L (ref 3.5–5.3)
SODIUM (MMOL/L) IN SER/PLAS: 137 MMOL/L (ref 136–145)
UREA NITROGEN (MG/DL) IN SER/PLAS: 31 MG/DL (ref 6–23)

## 2023-04-24 ENCOUNTER — HOSPITAL ENCOUNTER (OUTPATIENT)
Dept: DATA CONVERSION | Facility: HOSPITAL | Age: 78
End: 2023-04-24
Attending: INTERNAL MEDICINE | Admitting: INTERNAL MEDICINE
Payer: MEDICARE

## 2023-04-24 DIAGNOSIS — R07.9 CHEST PAIN, UNSPECIFIED: ICD-10-CM

## 2023-04-24 DIAGNOSIS — Z87.891 PERSONAL HISTORY OF NICOTINE DEPENDENCE: ICD-10-CM

## 2023-04-24 DIAGNOSIS — I25.119 ATHEROSCLEROTIC HEART DISEASE OF NATIVE CORONARY ARTERY WITH UNSPECIFIED ANGINA PECTORIS (CMS-HCC): ICD-10-CM

## 2023-04-24 DIAGNOSIS — E11.9 TYPE 2 DIABETES MELLITUS WITHOUT COMPLICATIONS (MULTI): ICD-10-CM

## 2023-04-24 DIAGNOSIS — I10 ESSENTIAL (PRIMARY) HYPERTENSION: ICD-10-CM

## 2023-04-24 DIAGNOSIS — E78.5 HYPERLIPIDEMIA, UNSPECIFIED: ICD-10-CM

## 2023-04-24 DIAGNOSIS — I25.2 OLD MYOCARDIAL INFARCTION: ICD-10-CM

## 2023-04-25 DIAGNOSIS — F41.9 ANXIETY: Primary | ICD-10-CM

## 2023-04-25 RX ORDER — SERTRALINE HYDROCHLORIDE 50 MG/1
TABLET, FILM COATED ORAL
Qty: 180 TABLET | Refills: 0 | Status: SHIPPED | OUTPATIENT
Start: 2023-04-25 | End: 2023-11-09 | Stop reason: ALTCHOICE

## 2023-05-10 LAB
ATRIAL RATE: 73 BPM
P AXIS: 59 DEGREES
P OFFSET: 190 MS
P ONSET: 139 MS
PR INTERVAL: 158 MS
Q ONSET: 218 MS
QRS COUNT: 12 BEATS
QRS DURATION: 88 MS
QT INTERVAL: 408 MS
QTC CALCULATION(BAZETT): 449 MS
QTC FREDERICIA: 435 MS
R AXIS: 22 DEGREES
T AXIS: 89 DEGREES
T OFFSET: 422 MS
VENTRICULAR RATE: 73 BPM

## 2023-05-16 DIAGNOSIS — E11.9 TYPE 2 DIABETES MELLITUS WITHOUT COMPLICATION, WITH LONG-TERM CURRENT USE OF INSULIN (MULTI): Primary | ICD-10-CM

## 2023-05-16 DIAGNOSIS — Z79.4 TYPE 2 DIABETES MELLITUS WITHOUT COMPLICATION, WITH LONG-TERM CURRENT USE OF INSULIN (MULTI): Primary | ICD-10-CM

## 2023-05-16 RX ORDER — METFORMIN HYDROCHLORIDE 500 MG/1
500 TABLET ORAL
Qty: 180 TABLET | Refills: 0 | Status: SHIPPED | OUTPATIENT
Start: 2023-05-16 | End: 2023-06-23 | Stop reason: SDUPTHER

## 2023-06-23 DIAGNOSIS — E11.9 TYPE 2 DIABETES MELLITUS WITHOUT COMPLICATION, WITHOUT LONG-TERM CURRENT USE OF INSULIN (MULTI): ICD-10-CM

## 2023-06-23 DIAGNOSIS — Z79.4 TYPE 2 DIABETES MELLITUS WITHOUT COMPLICATION, WITH LONG-TERM CURRENT USE OF INSULIN (MULTI): ICD-10-CM

## 2023-06-23 DIAGNOSIS — D51.0 PERNICIOUS ANEMIA: Primary | ICD-10-CM

## 2023-06-23 DIAGNOSIS — E11.9 TYPE 2 DIABETES MELLITUS WITHOUT COMPLICATION, WITH LONG-TERM CURRENT USE OF INSULIN (MULTI): ICD-10-CM

## 2023-06-23 RX ORDER — BLOOD-GLUCOSE METER
KIT MISCELLANEOUS
Qty: 100 STRIP | Refills: 1 | Status: SHIPPED | OUTPATIENT
Start: 2023-06-23 | End: 2023-06-27 | Stop reason: SDUPTHER

## 2023-06-23 RX ORDER — METFORMIN HYDROCHLORIDE 500 MG/1
500 TABLET ORAL
Qty: 60 TABLET | Refills: 1 | Status: SHIPPED | OUTPATIENT
Start: 2023-06-23 | End: 2023-07-26 | Stop reason: WASHOUT

## 2023-06-23 RX ORDER — UBIDECARENONE 75 MG
1000 CAPSULE ORAL DAILY
Qty: 60 TABLET | Refills: 1 | Status: SHIPPED | OUTPATIENT
Start: 2023-06-23 | End: 2023-06-27 | Stop reason: SDUPTHER

## 2023-06-27 DIAGNOSIS — D51.0 PERNICIOUS ANEMIA: ICD-10-CM

## 2023-06-27 DIAGNOSIS — E11.9 TYPE 2 DIABETES MELLITUS WITHOUT COMPLICATION, WITHOUT LONG-TERM CURRENT USE OF INSULIN (MULTI): ICD-10-CM

## 2023-06-27 RX ORDER — BLOOD-GLUCOSE METER
KIT MISCELLANEOUS
Qty: 100 STRIP | Refills: 0 | Status: SHIPPED | OUTPATIENT
Start: 2023-06-27 | End: 2023-07-26 | Stop reason: WASHOUT

## 2023-06-27 RX ORDER — UBIDECARENONE 75 MG
1000 CAPSULE ORAL DAILY
Qty: 60 TABLET | Refills: 0 | Status: SHIPPED | OUTPATIENT
Start: 2023-06-27 | End: 2023-07-26 | Stop reason: SDUPTHER

## 2023-07-25 NOTE — PROGRESS NOTES
Subjective     HPI   Janine Hendrix is a 77 y.o. year old female patient with presenting to clinic with concern for   Chief Complaint   Patient presents with    New Patient Visit     Establish care.     GERD     Has it bad. Was on omeprazole but she was told she can't take because she is on blood thinners.        Ms. Hendrix presents to clinic to establish as a new patient formerly seen by Dr. Baron.  Only concern is urinary frequency, unsure how long it has been ongoing. Pt memory issues which has been ongoing.  Was on omeprazole previously but can't take it with plavix. Was not given alternative  She had EGD in the past with GERD issues, needs PPI.    She sees Dr. Donovan for Dermatology.   Ophtho- up to date on annual exams        Patient Active Problem List   Diagnosis    Abnormal skin growth    Anemia    Anxiety with depression    Aspiration pneumonia (CMS/HCC)    Bilateral carotid artery stenosis    BPPV (benign paroxysmal positional vertigo)    Changing skin lesion    Chest pain    Chronic fatigue and malaise    Chronic low back pain    Chronic right hip pain    Coronary artery disease involving native coronary artery of native heart without angina pectoris    Dementia without behavioral disturbance (CMS/HCC)    DM2 (diabetes mellitus, type 2) (CMS/HCC)    Dyslipidemia    Dysphagia, cricopharyngeal    Elevated TSH    Essential hypertension    GERD (gastroesophageal reflux disease)    Hip pain, right    History of heart artery stent    History of total mastectomy of left breast    Hypokalemia    Hypomagnesemia    Injury of right common femoral artery    Intraoperative complication involving circulatory system associated with cardiac catheterization    Left hip pain    Low back pain    Neck pain    Numbness    Oropharyngeal aspiration    Oropharyngeal dysphagia    Other fatigue    Overweight    PAD (peripheral artery disease) (CMS/HCC)    Pain of left upper extremity    Pain of right upper extremity     Panic attacks    Pernicious anemia    Right leg claudication (CMS/HCC)    Sensation, choking    SCC (squamous cell carcinoma)    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HCC)    Type 2 diabetes mellitus without complication, without long-term current use of insulin (CMS/HCC)    Vitamin D deficiency    Zenker's diverticulum    Allergic rhinitis    Insomnia       Past Medical History:   Diagnosis Date    DM2 (diabetes mellitus, type 2) (CMS/HCC)     Dyslipidemia     Encounter for other preprocedural examination 10/19/2021    Pre-operative clearance    GERD (gastroesophageal reflux disease)     HTN (hypertension), benign     Pain in left hip 06/18/2020    Left hip pain    Personal history of other diseases of the respiratory system 11/30/2020    History of acute bronchitis      Past Surgical History:   Procedure Laterality Date    CT ABDOMEN PELVIS ANGIOGRAM W AND/OR WO IV CONTRAST  10/28/2020    CT ABDOMEN PELVIS ANGIOGRAM W AND/OR WO IV CONTRAST 10/28/2020 [facility]    OTHER SURGICAL HISTORY  11/18/2020    Vascular surgical procedure    OTHER SURGICAL HISTORY  11/18/2020    Cardiac catheterization      Family History   Problem Relation Name Age of Onset    No Known Problems Mother      No Known Problems Father      No Known Problems Other        Social History     Tobacco Use    Smoking status: Former     Packs/day: 0.50     Years: 5.00     Total pack years: 2.50     Types: Cigarettes    Smokeless tobacco: Never   Substance Use Topics    Alcohol use: Yes     Comment: rare        Current Outpatient Medications:     amLODIPine (Norvasc) 5 mg tablet, Take 1 tablet (5 mg) by mouth once daily., Disp: , Rfl:     aspirin 81 mg EC tablet, Take 1 tablet (81 mg) by mouth once daily., Disp: , Rfl:     clopidogrel (Plavix) 75 mg tablet, , Disp: , Rfl:     evolocumab (Repatha Syringe) 140 mg/mL injection, Inject 1 mL (140 mg) under the skin every 14 (fourteen) days., Disp: , Rfl:     isosorbide mononitrate ER (Imdur) 30  "mg 24 hr tablet, Take 1 tablet (30 mg) by mouth once daily., Disp: , Rfl:     metoprolol succinate XL (Toprol-XL) 100 mg 24 hr tablet, Take 1 tablet (100 mg) by mouth once daily., Disp: , Rfl:     sertraline (Zoloft) 50 mg tablet, TAKE 2 TABLETS BY MOUTH DAILY, Disp: 180 tablet, Rfl: 0    acetaminophen (Tylenol) 325 mg tablet, Take 1 tablet (325 mg) by mouth every 8 hours if needed for moderate pain (4 - 6)., Disp: , Rfl:     albuterol 90 mcg/actuation inhaler, Inhale 2 puffs every 6 hours if needed for shortness of breath., Disp: 18 g, Rfl: 0    BD Luer-Akilah Syringe 3 mL 22 x 1 1/2\" syringe, 1 every 2 weeks, Disp: , Rfl:     calcium carbonate (Tums) 200 mg calcium chewable tablet, Chew 2 tablets (1,000 mg) once daily., Disp: 180 tablet, Rfl: 3    cyanocobalamin (Vitamin B-12) 500 mcg tablet, Take 2 tablets (1,000 mcg) by mouth once daily., Disp: 180 tablet, Rfl: 1    ergocalciferol (Vitamin D-2) 1.25 MG (41572 UT) capsule, Take 1 capsule (1,250 mcg) by mouth 1 (one) time per week., Disp: 12 capsule, Rfl: 3    hydrALAZINE (Apresoline) 50 mg tablet, Take 1 tablet (50 mg) by mouth 2 times a day., Disp: , Rfl:     losartan (Cozaar) 50 mg tablet, Take 1 tablet (50 mg) by mouth once daily., Disp: , Rfl:     nitroglycerin (Nitrostat) 0.4 mg SL tablet, Place 1 tablet (0.4 mg) under the tongue every 5 minutes if needed for chest pain., Disp: , Rfl:     pantoprazole (ProtoNix) 40 mg EC tablet, Take 1 tablet (40 mg) by mouth once daily in the morning. Take before meals. Do not crush, chew, or split., Disp: 90 tablet, Rfl: 3    syringe, disposable, (Carepoint Luer Slip Syringe) 1 mL syringe, , Disp: , Rfl:      Review of Systems  Review of Systems:   Constitutional: Denies fever  HEENT: Denies ST, earache  CVS: Denies Chest pain  Pulmonary: Denies wheezing, SOB  GI: Denies N/V  : Denies dysuria  Musculoskeletal:  Denies myalgia  Neuro: Denies focal weakness or numbness.  Skin: Denies Rashes.  *Review of Systems is negative " "unless otherwise mentioned in HPI or ROS above.    Objective   /60   Pulse 66   Temp 37 °C (98.6 °F)   Ht 1.651 m (5' 5\")   Wt 56.2 kg (123 lb 12.8 oz)   SpO2 96%   BMI 20.60 kg/m²     Physical Exam  Physical exam:  /60   Pulse 66   Temp 37 °C (98.6 °F)   Ht 1.651 m (5' 5\")   Wt 56.2 kg (123 lb 12.8 oz)   SpO2 96%   BMI 20.60 kg/m²  reviewed   Body mass index is 20.6 kg/m².   Constitutional: NAD.  Resting comfortably.  Head: Atraumatic, normocephalic.  EENT: deferred d/t masking  Cardiac: Regular rate & rhythm.   Pulmonary: Lungs clear bilat  GI: Soft, Nontender, nondistended.   Musculoskeletal: No peripheral edema.   Skin: No evidence of trauma. No rashes  Psych: Intact judgement and insight.    .Assessment/Plan   Problem List Items Addressed This Visit       Anxiety with depression    Relevant Medications    buPROPion XL (Wellbutrin XL) 150 mg 24 hr tablet    GERD (gastroesophageal reflux disease) - Primary    Relevant Medications    pantoprazole (ProtoNix) 40 mg EC tablet    Pernicious anemia    Relevant Medications    cyanocobalamin (Vitamin B-12) 500 mcg tablet     Other Visit Diagnoses       Encounter for screening mammogram for breast cancer        Relevant Orders    BI mammo bilateral screening tomosynthesis    Mild intermittent asthma without complication        Relevant Medications    albuterol 90 mcg/actuation inhaler    Age-related bone loss        Relevant Medications    ergocalciferol (Vitamin D-2) 1.25 MG (62932 UT) capsule    calcium carbonate (Tums) 200 mg calcium chewable tablet    Urinary frequency        Relevant Orders    POCT UA Automated manually resulted    Wheezing        Relevant Medications    ipratropium-albuteroL (Duo-Neb) 0.5-2.5 mg/3 mL nebulizer solution                Change zoloft dosing-  decrease daily dosing:  Week 1- half pill in the mornings, whole pill at night  Week 2- half pill in the morning, half at night  Week 3-  same- half pill in the morning, " half at night  Week 4- half pill at night only  Week 5 - same- half pill at night only   Week 6- stop zoloft    Start wellbutrin in the mornings 1 pill daily    Start pantoprazole as PPI for chronic GERD

## 2023-07-26 ENCOUNTER — TELEPHONE (OUTPATIENT)
Dept: PRIMARY CARE | Facility: CLINIC | Age: 78
End: 2023-07-26

## 2023-07-26 ENCOUNTER — OFFICE VISIT (OUTPATIENT)
Dept: PRIMARY CARE | Facility: CLINIC | Age: 78
End: 2023-07-26
Payer: MEDICARE

## 2023-07-26 VITALS
DIASTOLIC BLOOD PRESSURE: 60 MMHG | BODY MASS INDEX: 20.62 KG/M2 | WEIGHT: 123.8 LBS | SYSTOLIC BLOOD PRESSURE: 118 MMHG | HEIGHT: 65 IN | OXYGEN SATURATION: 96 % | HEART RATE: 66 BPM | TEMPERATURE: 98.6 F

## 2023-07-26 DIAGNOSIS — M85.80 AGE-RELATED BONE LOSS: ICD-10-CM

## 2023-07-26 DIAGNOSIS — Z12.31 ENCOUNTER FOR SCREENING MAMMOGRAM FOR BREAST CANCER: ICD-10-CM

## 2023-07-26 DIAGNOSIS — K21.9 GASTROESOPHAGEAL REFLUX DISEASE, UNSPECIFIED WHETHER ESOPHAGITIS PRESENT: Primary | ICD-10-CM

## 2023-07-26 DIAGNOSIS — N30.00 ACUTE CYSTITIS WITHOUT HEMATURIA: ICD-10-CM

## 2023-07-26 DIAGNOSIS — R06.2 WHEEZING: ICD-10-CM

## 2023-07-26 DIAGNOSIS — D51.0 PERNICIOUS ANEMIA: ICD-10-CM

## 2023-07-26 DIAGNOSIS — R35.0 URINARY FREQUENCY: ICD-10-CM

## 2023-07-26 DIAGNOSIS — F41.8 ANXIETY WITH DEPRESSION: ICD-10-CM

## 2023-07-26 DIAGNOSIS — J45.20 MILD INTERMITTENT ASTHMA WITHOUT COMPLICATION (HHS-HCC): ICD-10-CM

## 2023-07-26 LAB
POC APPEARANCE, URINE: CLEAR
POC BILIRUBIN, URINE: NEGATIVE
POC BLOOD, URINE: NEGATIVE
POC COLOR, URINE: YELLOW
POC GLUCOSE, URINE: NEGATIVE MG/DL
POC KETONES, URINE: NEGATIVE MG/DL
POC LEUKOCYTES, URINE: ABNORMAL
POC NITRITE,URINE: NEGATIVE
POC PH, URINE: 5.5 PH
POC PROTEIN, URINE: NEGATIVE MG/DL
POC SPECIFIC GRAVITY, URINE: 1.02
POC UROBILINOGEN, URINE: 0.2 EU/DL

## 2023-07-26 PROCEDURE — 1160F RVW MEDS BY RX/DR IN RCRD: CPT | Performed by: PHYSICIAN ASSISTANT

## 2023-07-26 PROCEDURE — 99214 OFFICE O/P EST MOD 30 MIN: CPT | Performed by: PHYSICIAN ASSISTANT

## 2023-07-26 PROCEDURE — 1126F AMNT PAIN NOTED NONE PRSNT: CPT | Performed by: PHYSICIAN ASSISTANT

## 2023-07-26 PROCEDURE — 81003 URINALYSIS AUTO W/O SCOPE: CPT | Performed by: PHYSICIAN ASSISTANT

## 2023-07-26 PROCEDURE — 3074F SYST BP LT 130 MM HG: CPT | Performed by: PHYSICIAN ASSISTANT

## 2023-07-26 PROCEDURE — 1159F MED LIST DOCD IN RCRD: CPT | Performed by: PHYSICIAN ASSISTANT

## 2023-07-26 PROCEDURE — 3078F DIAST BP <80 MM HG: CPT | Performed by: PHYSICIAN ASSISTANT

## 2023-07-26 PROCEDURE — 87086 URINE CULTURE/COLONY COUNT: CPT

## 2023-07-26 PROCEDURE — 1036F TOBACCO NON-USER: CPT | Performed by: PHYSICIAN ASSISTANT

## 2023-07-26 RX ORDER — IPRATROPIUM BROMIDE AND ALBUTEROL SULFATE 2.5; .5 MG/3ML; MG/3ML
3 SOLUTION RESPIRATORY (INHALATION) 4 TIMES DAILY PRN
Qty: 90 ML | Refills: 3 | Status: SHIPPED | OUTPATIENT
Start: 2023-07-26 | End: 2023-10-10 | Stop reason: SDUPTHER

## 2023-07-26 RX ORDER — BUPROPION HYDROCHLORIDE 150 MG/1
150 TABLET ORAL EVERY MORNING
Qty: 60 TABLET | Refills: 0 | Status: SHIPPED | OUTPATIENT
Start: 2023-07-26 | End: 2023-09-27 | Stop reason: SDUPTHER

## 2023-07-26 RX ORDER — UBIDECARENONE 75 MG
1000 CAPSULE ORAL DAILY
Qty: 180 TABLET | Refills: 1 | Status: SHIPPED | OUTPATIENT
Start: 2023-07-26 | End: 2024-01-22

## 2023-07-26 RX ORDER — ALBUTEROL SULFATE 90 UG/1
2 AEROSOL, METERED RESPIRATORY (INHALATION) EVERY 6 HOURS PRN
Qty: 18 G | Refills: 0 | Status: SHIPPED | OUTPATIENT
Start: 2023-07-26 | End: 2023-08-10

## 2023-07-26 RX ORDER — PANTOPRAZOLE SODIUM 40 MG/1
40 TABLET, DELAYED RELEASE ORAL
Qty: 90 TABLET | Refills: 3 | Status: SHIPPED | OUTPATIENT
Start: 2023-07-26 | End: 2023-11-14 | Stop reason: ALTCHOICE

## 2023-07-26 RX ORDER — NITROFURANTOIN 25; 75 MG/1; MG/1
100 CAPSULE ORAL 2 TIMES DAILY
Qty: 14 CAPSULE | Refills: 0 | Status: SHIPPED | OUTPATIENT
Start: 2023-07-26 | End: 2023-08-02

## 2023-07-26 RX ORDER — CALCIUM CARBONATE 200(500)MG
2 TABLET,CHEWABLE ORAL DAILY
Qty: 180 TABLET | Refills: 3 | Status: SHIPPED | OUTPATIENT
Start: 2023-07-26 | End: 2023-09-27 | Stop reason: SINTOL

## 2023-07-26 RX ORDER — CLOPIDOGREL BISULFATE 75 MG/1
TABLET ORAL
COMMUNITY
Start: 2023-05-08 | End: 2024-05-31

## 2023-07-26 RX ORDER — ERGOCALCIFEROL 1.25 MG/1
1 CAPSULE ORAL
Qty: 12 CAPSULE | Refills: 3 | Status: SHIPPED | OUTPATIENT
Start: 2023-07-26 | End: 2024-03-13 | Stop reason: SINTOL

## 2023-07-26 NOTE — PATIENT INSTRUCTIONS
Change zoloft dosing-  decrease daily dosing:  Week 1- half pill in the mornings, whole pill at night  Week 2- half pill in the morning, half at night  Week 3-  same- half pill in the morning, half at night  Week 4- half pill at night only  Week 5 - same- half pill at night only   Week 6- stop zoloft    Start wellbutrin in the mornings 1 pill daily

## 2023-07-26 NOTE — TELEPHONE ENCOUNTER
Pharmacy called and said that the dx code that was sent over for duo-neb, will not be covered by insurance. Wheezing was sent over as the code but it needs to be something chronic.

## 2023-07-27 LAB — URINE CULTURE: NORMAL

## 2023-08-10 DIAGNOSIS — J45.20 MILD INTERMITTENT ASTHMA WITHOUT COMPLICATION (HHS-HCC): ICD-10-CM

## 2023-08-10 RX ORDER — ALBUTEROL SULFATE 90 UG/1
AEROSOL, METERED RESPIRATORY (INHALATION)
Qty: 17 G | Refills: 0 | Status: SHIPPED | OUTPATIENT
Start: 2023-08-10 | End: 2023-10-03

## 2023-08-24 ENCOUNTER — TELEPHONE (OUTPATIENT)
Dept: PRIMARY CARE | Facility: CLINIC | Age: 78
End: 2023-08-24
Payer: MEDICARE

## 2023-08-24 DIAGNOSIS — R30.0 DYSURIA: Primary | ICD-10-CM

## 2023-08-24 RX ORDER — CEPHALEXIN 500 MG/1
500 CAPSULE ORAL 2 TIMES DAILY
Qty: 20 CAPSULE | Refills: 0 | Status: SHIPPED | OUTPATIENT
Start: 2023-08-24 | End: 2023-09-03

## 2023-09-26 NOTE — PROGRESS NOTES
Subjective     HPI   Janine Hendrix is a 78 y.o. year old female patient with presenting to clinic with concern for   Chief Complaint   Patient presents with    Follow-up    Medication Problem     Stronger dose of Wellbutrin.              Mamm 4/22/21- already has order pending for mamm. Hx masectomy.    Only concern is urinary frequency, unsure how long it has been ongoing. Pt memory issues which has been ongoing.    Was on omeprazole previously but can't take it with plavix. Was not given alternative  She had EGD in the past with GERD issues, needs PPI.    She sees Dr. Donovan for Dermatology.   Ophtho- up to date on annual exams    Feels wellbutrin is helpful, but higher dose would be more helpful.    At last visit:  Change zoloft dosing-  decrease daily dosing:  Week 1- half pill in the mornings, whole pill at night  Week 2- half pill in the morning, half at night  Week 3-  same- half pill in the morning, half at night  Week 4- half pill at night only  Week 5 - same- half pill at night only   Week 6- stop zoloft    Start wellbutrin in the mornings 1 pill daily    Start pantoprazole as PPI for chronic GERD    Patient Active Problem List   Diagnosis    Abnormal skin growth    Anemia    Anxiety with depression    Aspiration pneumonia (CMS/HCC)    Bilateral carotid artery stenosis    BPPV (benign paroxysmal positional vertigo)    Changing skin lesion    Chest pain    Chronic fatigue and malaise    Chronic low back pain    Chronic right hip pain    Coronary artery disease involving native coronary artery of native heart without angina pectoris    Dementia without behavioral disturbance (CMS/HCC)    DM2 (diabetes mellitus, type 2) (CMS/HCC)    Dyslipidemia    Dysphagia, cricopharyngeal    Elevated TSH    Essential hypertension    GERD (gastroesophageal reflux disease)    Hip pain, right    History of heart artery stent    History of total mastectomy of left breast    Hypokalemia    Hypomagnesemia    Injury of right  common femoral artery    Intraoperative complication involving circulatory system associated with cardiac catheterization    Left hip pain    Low back pain    Neck pain    Numbness    Oropharyngeal aspiration    Oropharyngeal dysphagia    Other fatigue    Overweight    PAD (peripheral artery disease) (CMS/[facility])    Pain of left upper extremity    Pain of right upper extremity    Panic attacks    Pernicious anemia    Right leg claudication (CMS/[facility])    Sensation, choking    SCC (squamous cell carcinoma)    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[facility])    Type 2 diabetes mellitus without complication, without long-term current use of insulin (CMS/[facility])    Vitamin D deficiency    Zenker's diverticulum    Allergic rhinitis    Insomnia       Past Medical History:   Diagnosis Date    DM2 (diabetes mellitus, type 2) (CMS/[facility])     Dyslipidemia     Encounter for other preprocedural examination 10/19/2021    Pre-operative clearance    GERD (gastroesophageal reflux disease)     HTN (hypertension), benign     Pain in left hip 06/18/2020    Left hip pain    Personal history of other diseases of the respiratory system 11/30/2020    History of acute bronchitis      Past Surgical History:   Procedure Laterality Date    CT ABDOMEN PELVIS ANGIOGRAM W AND/OR WO IV CONTRAST  10/28/2020    CT ABDOMEN PELVIS ANGIOGRAM W AND/OR WO IV CONTRAST 10/28/2020 [facility]    OTHER SURGICAL HISTORY  11/18/2020    Vascular surgical procedure    OTHER SURGICAL HISTORY  11/18/2020    Cardiac catheterization      Family History   Problem Relation Name Age of Onset    No Known Problems Mother      No Known Problems Father      No Known Problems Other        Social History     Tobacco Use    Smoking status: Former     Packs/day: 0.50     Years: 5.00     Additional pack years: 0.00     Total pack years: 2.50     Types: Cigarettes    Smokeless tobacco: Never   Substance Use Topics    Alcohol use: Yes     Comment: rare        Current Outpatient  "Medications:     acetaminophen (Tylenol) 325 mg tablet, Take 1 tablet (325 mg) by mouth every 8 hours if needed for moderate pain (4 - 6)., Disp: , Rfl:     albuterol 90 mcg/actuation inhaler, INHALE 2 INHALATIONS BY MOUTH  EVERY 6 HOURS IF NEEDED FOR  SHORTNESS OF BREATH, Disp: 17 g, Rfl: 0    amLODIPine (Norvasc) 5 mg tablet, Take 1 tablet (5 mg) by mouth once daily., Disp: , Rfl:     aspirin 81 mg EC tablet, Take 1 tablet (81 mg) by mouth once daily., Disp: , Rfl:     BD Luer-Akilah Syringe 3 mL 22 x 1 1/2\" syringe, 1 every 2 weeks, Disp: , Rfl:     buPROPion XL (Wellbutrin XL) 150 mg 24 hr tablet, Take 1 tablet (150 mg) by mouth once daily in the morning. Do not crush, chew, or split., Disp: 60 tablet, Rfl: 0    calcium carbonate (Tums) 200 mg calcium chewable tablet, Chew 2 tablets (1,000 mg) once daily., Disp: 180 tablet, Rfl: 3    clopidogrel (Plavix) 75 mg tablet, , Disp: , Rfl:     cyanocobalamin (Vitamin B-12) 500 mcg tablet, Take 2 tablets (1,000 mcg) by mouth once daily., Disp: 180 tablet, Rfl: 1    ergocalciferol (Vitamin D-2) 1.25 MG (23294 UT) capsule, Take 1 capsule (1,250 mcg) by mouth 1 (one) time per week., Disp: 12 capsule, Rfl: 3    evolocumab (Repatha Syringe) 140 mg/mL injection, Inject 1 mL (140 mg) under the skin every 14 (fourteen) days., Disp: , Rfl:     hydrALAZINE (Apresoline) 50 mg tablet, Take 1 tablet (50 mg) by mouth 2 times a day., Disp: , Rfl:     ipratropium-albuteroL (Duo-Neb) 0.5-2.5 mg/3 mL nebulizer solution, Take 3 mL by nebulization 4 times a day as needed for wheezing or shortness of breath., Disp: 90 mL, Rfl: 3    isosorbide mononitrate ER (Imdur) 30 mg 24 hr tablet, Take 1 tablet (30 mg) by mouth once daily., Disp: , Rfl:     losartan (Cozaar) 50 mg tablet, Take 1 tablet (50 mg) by mouth once daily., Disp: , Rfl:     metFORMIN  mg 24 hr tablet, , Disp: , Rfl:     metoprolol succinate XL (Toprol-XL) 100 mg 24 hr tablet, Take 1 tablet (100 mg) by mouth once daily., Disp: " ", Rfl:     nitroglycerin (Nitrostat) 0.4 mg SL tablet, Place 1 tablet (0.4 mg) under the tongue every 5 minutes if needed for chest pain., Disp: , Rfl:     pantoprazole (ProtoNix) 40 mg EC tablet, Take 1 tablet (40 mg) by mouth once daily in the morning. Take before meals. Do not crush, chew, or split., Disp: 90 tablet, Rfl: 3    sertraline (Zoloft) 50 mg tablet, TAKE 2 TABLETS BY MOUTH DAILY, Disp: 180 tablet, Rfl: 0    syringe, disposable, (Carepoint Luer Slip Syringe) 1 mL syringe, , Disp: , Rfl:      Review of Systems  Constitutional: Denies fever  HEENT: Denies ST, earache  CVS: Denies Chest pain  Pulmonary: Denies wheezing, SOB  GI: Denies N/V  : Denies dysuria  Musculoskeletal:  Denies myalgia  Neuro: Denies focal weakness or numbness.  Skin: Denies Rashes.  *Review of Systems is negative unless otherwise mentioned in HPI or ROS above.    Objective   BP (!) 120/40   Pulse 72   Temp 37 °C (98.6 °F)   Ht 1.651 m (5' 5\")   Wt 55.8 kg (123 lb)   SpO2 98%   BMI 20.47 kg/m²  reviewed Body mass index is 20.47 kg/m².     Physical Exam  Constitutional: NAD.  Resting comfortably.  Head: Atraumatic, normocephalic.  ENT: Moist oral mucosa. Nasal mucosa wnl.   Cardiac: Regular rate & rhythm.   Pulmonary: Lungs clear bilat  GI: Soft, Nontender, nondistended.   Musculoskeletal: No peripheral edema.   Skin: No evidence of trauma. No rashes  Psych: Intact judgement and insight.    .Assessment/Plan   Problem List Items Addressed This Visit             ICD-10-CM    Anxiety with depression F41.8    Relevant Medications    buPROPion XL (Wellbutrin XL) 300 mg 24 hr tablet    DM2 (diabetes mellitus, type 2) (CMS/[facility]) E11.9    Relevant Medications    metFORMIN  mg 24 hr tablet    Other Relevant Orders    POCT glycosylated hemoglobin (Hb A1C) manually resulted (Completed)    Type 2 diabetes mellitus without complication, without long-term current use of insulin (CMS/[facility]) - Primary E11.9     Other Visit Diagnoses         " Codes    Age-related bone loss     M85.80    Relevant Medications    calcium carbonate (Os-Steve) 500 mg calcium (1,250 mg) chewable tablet    Flu vaccine need     Z23    Relevant Orders    Flu vaccine, quadrivalent, high-dose, preservative free, age 65y+ (FLUZONE) (Completed)

## 2023-09-27 ENCOUNTER — OFFICE VISIT (OUTPATIENT)
Dept: PRIMARY CARE | Facility: CLINIC | Age: 78
End: 2023-09-27
Payer: MEDICARE

## 2023-09-27 VITALS
BODY MASS INDEX: 20.49 KG/M2 | TEMPERATURE: 98.6 F | HEART RATE: 72 BPM | HEIGHT: 65 IN | OXYGEN SATURATION: 98 % | WEIGHT: 123 LBS | SYSTOLIC BLOOD PRESSURE: 120 MMHG | DIASTOLIC BLOOD PRESSURE: 40 MMHG

## 2023-09-27 DIAGNOSIS — M85.80 AGE-RELATED BONE LOSS: ICD-10-CM

## 2023-09-27 DIAGNOSIS — E11.9 TYPE 2 DIABETES MELLITUS WITHOUT COMPLICATION, WITHOUT LONG-TERM CURRENT USE OF INSULIN (MULTI): Primary | ICD-10-CM

## 2023-09-27 DIAGNOSIS — E11.9 TYPE 2 DIABETES MELLITUS WITHOUT COMPLICATION, UNSPECIFIED WHETHER LONG TERM INSULIN USE (MULTI): ICD-10-CM

## 2023-09-27 DIAGNOSIS — Z23 FLU VACCINE NEED: ICD-10-CM

## 2023-09-27 DIAGNOSIS — F41.8 ANXIETY WITH DEPRESSION: ICD-10-CM

## 2023-09-27 LAB — POC HEMOGLOBIN A1C: 6.8 % (ref 4.2–6.5)

## 2023-09-27 PROCEDURE — 83036 HEMOGLOBIN GLYCOSYLATED A1C: CPT | Performed by: PHYSICIAN ASSISTANT

## 2023-09-27 PROCEDURE — G0008 ADMIN INFLUENZA VIRUS VAC: HCPCS | Performed by: PHYSICIAN ASSISTANT

## 2023-09-27 PROCEDURE — 1159F MED LIST DOCD IN RCRD: CPT | Performed by: PHYSICIAN ASSISTANT

## 2023-09-27 PROCEDURE — 1126F AMNT PAIN NOTED NONE PRSNT: CPT | Performed by: PHYSICIAN ASSISTANT

## 2023-09-27 PROCEDURE — 90662 IIV NO PRSV INCREASED AG IM: CPT | Performed by: PHYSICIAN ASSISTANT

## 2023-09-27 PROCEDURE — 3078F DIAST BP <80 MM HG: CPT | Performed by: PHYSICIAN ASSISTANT

## 2023-09-27 PROCEDURE — 1160F RVW MEDS BY RX/DR IN RCRD: CPT | Performed by: PHYSICIAN ASSISTANT

## 2023-09-27 PROCEDURE — 3074F SYST BP LT 130 MM HG: CPT | Performed by: PHYSICIAN ASSISTANT

## 2023-09-27 PROCEDURE — 99214 OFFICE O/P EST MOD 30 MIN: CPT | Performed by: PHYSICIAN ASSISTANT

## 2023-09-27 PROCEDURE — 1036F TOBACCO NON-USER: CPT | Performed by: PHYSICIAN ASSISTANT

## 2023-09-27 RX ORDER — METFORMIN HYDROCHLORIDE 500 MG/1
500 TABLET, EXTENDED RELEASE ORAL
COMMUNITY
Start: 2019-12-30 | End: 2023-09-27 | Stop reason: SDUPTHER

## 2023-09-27 RX ORDER — CALCIUM CARBONATE 500(1250)
2 TABLET,CHEWABLE ORAL 2 TIMES DAILY
Qty: 360 TABLET | Refills: 1 | Status: SHIPPED | OUTPATIENT
Start: 2023-09-27 | End: 2023-11-09 | Stop reason: ALTCHOICE

## 2023-09-27 RX ORDER — METFORMIN HYDROCHLORIDE 500 MG/1
TABLET, EXTENDED RELEASE ORAL
Qty: 180 TABLET | Refills: 1 | Status: CANCELLED | OUTPATIENT
Start: 2023-09-27

## 2023-09-27 RX ORDER — METFORMIN HYDROCHLORIDE 500 MG/1
500 TABLET, EXTENDED RELEASE ORAL
Qty: 180 TABLET | Refills: 1 | Status: SHIPPED | OUTPATIENT
Start: 2023-09-27 | End: 2024-02-21 | Stop reason: SDUPTHER

## 2023-09-27 RX ORDER — BUPROPION HYDROCHLORIDE 300 MG/1
300 TABLET ORAL EVERY MORNING
Qty: 90 TABLET | Refills: 1 | Status: SHIPPED | OUTPATIENT
Start: 2023-09-27 | End: 2024-02-21 | Stop reason: SDUPTHER

## 2023-09-27 ASSESSMENT — PATIENT HEALTH QUESTIONNAIRE - PHQ9
1. LITTLE INTEREST OR PLEASURE IN DOING THINGS: NOT AT ALL
SUM OF ALL RESPONSES TO PHQ9 QUESTIONS 1 AND 2: 0
2. FEELING DOWN, DEPRESSED OR HOPELESS: NOT AT ALL

## 2023-10-03 DIAGNOSIS — J45.20 MILD INTERMITTENT ASTHMA WITHOUT COMPLICATION (HHS-HCC): ICD-10-CM

## 2023-10-03 RX ORDER — ALBUTEROL SULFATE 90 UG/1
AEROSOL, METERED RESPIRATORY (INHALATION)
Qty: 17 G | Refills: 2 | Status: SHIPPED | OUTPATIENT
Start: 2023-10-03

## 2023-10-10 DIAGNOSIS — R06.2 WHEEZING: ICD-10-CM

## 2023-10-10 DIAGNOSIS — E11.9 TYPE 2 DIABETES MELLITUS WITHOUT COMPLICATION, UNSPECIFIED WHETHER LONG TERM INSULIN USE (MULTI): ICD-10-CM

## 2023-10-10 DIAGNOSIS — J45.20 MILD INTERMITTENT ASTHMA WITHOUT COMPLICATION (HHS-HCC): ICD-10-CM

## 2023-10-10 RX ORDER — BLOOD SUGAR DIAGNOSTIC
STRIP MISCELLANEOUS
Qty: 100 STRIP | Refills: 1 | Status: SHIPPED | OUTPATIENT
Start: 2023-10-10

## 2023-10-10 RX ORDER — BLOOD SUGAR DIAGNOSTIC
100 STRIP MISCELLANEOUS 2 TIMES DAILY
COMMUNITY
End: 2023-10-10 | Stop reason: SDUPTHER

## 2023-10-10 RX ORDER — IPRATROPIUM BROMIDE AND ALBUTEROL SULFATE 2.5; .5 MG/3ML; MG/3ML
3 SOLUTION RESPIRATORY (INHALATION) 4 TIMES DAILY PRN
Qty: 90 ML | Refills: 3 | Status: SHIPPED | OUTPATIENT
Start: 2023-10-10 | End: 2024-10-09

## 2023-10-24 ENCOUNTER — TELEPHONE (OUTPATIENT)
Dept: PRIMARY CARE | Facility: CLINIC | Age: 78
End: 2023-10-24
Payer: MEDICARE

## 2023-10-24 DIAGNOSIS — R30.0 DYSURIA: Primary | ICD-10-CM

## 2023-10-24 RX ORDER — NITROFURANTOIN 25; 75 MG/1; MG/1
100 CAPSULE ORAL 2 TIMES DAILY
Qty: 14 CAPSULE | Refills: 0 | Status: SHIPPED | OUTPATIENT
Start: 2023-10-24 | End: 2023-10-31

## 2023-10-25 ENCOUNTER — LAB (OUTPATIENT)
Dept: LAB | Facility: LAB | Age: 78
End: 2023-10-25
Payer: MEDICARE

## 2023-10-25 DIAGNOSIS — R30.0 DYSURIA: ICD-10-CM

## 2023-11-03 ENCOUNTER — OFFICE VISIT (OUTPATIENT)
Dept: PRIMARY CARE | Facility: CLINIC | Age: 78
End: 2023-11-03
Payer: MEDICARE

## 2023-11-03 VITALS
SYSTOLIC BLOOD PRESSURE: 110 MMHG | TEMPERATURE: 97.3 F | DIASTOLIC BLOOD PRESSURE: 60 MMHG | WEIGHT: 126.2 LBS | OXYGEN SATURATION: 97 % | HEART RATE: 68 BPM | HEIGHT: 65 IN | BODY MASS INDEX: 21.02 KG/M2

## 2023-11-03 DIAGNOSIS — R11.2 NAUSEA AND VOMITING, UNSPECIFIED VOMITING TYPE: Primary | ICD-10-CM

## 2023-11-03 DIAGNOSIS — J01.00 ACUTE NON-RECURRENT MAXILLARY SINUSITIS: ICD-10-CM

## 2023-11-03 DIAGNOSIS — N30.00 ACUTE CYSTITIS WITHOUT HEMATURIA: ICD-10-CM

## 2023-11-03 DIAGNOSIS — K21.9 GASTROESOPHAGEAL REFLUX DISEASE WITHOUT ESOPHAGITIS: ICD-10-CM

## 2023-11-03 LAB
POC APPEARANCE, URINE: CLEAR
POC BILIRUBIN, URINE: ABNORMAL
POC BLOOD, URINE: NEGATIVE
POC COLOR, URINE: YELLOW
POC GLUCOSE, URINE: NEGATIVE MG/DL
POC KETONES, URINE: ABNORMAL MG/DL
POC LEUKOCYTES, URINE: ABNORMAL
POC NITRITE,URINE: NEGATIVE
POC PH, URINE: 5.5 PH
POC PROTEIN, URINE: NEGATIVE MG/DL
POC SPECIFIC GRAVITY, URINE: >=1.03
POC UROBILINOGEN, URINE: 0.2 EU/DL

## 2023-11-03 PROCEDURE — 3078F DIAST BP <80 MM HG: CPT | Performed by: PHYSICIAN ASSISTANT

## 2023-11-03 PROCEDURE — 3074F SYST BP LT 130 MM HG: CPT | Performed by: PHYSICIAN ASSISTANT

## 2023-11-03 PROCEDURE — 87086 URINE CULTURE/COLONY COUNT: CPT

## 2023-11-03 PROCEDURE — 1160F RVW MEDS BY RX/DR IN RCRD: CPT | Performed by: PHYSICIAN ASSISTANT

## 2023-11-03 PROCEDURE — 1036F TOBACCO NON-USER: CPT | Performed by: PHYSICIAN ASSISTANT

## 2023-11-03 PROCEDURE — 81003 URINALYSIS AUTO W/O SCOPE: CPT | Performed by: PHYSICIAN ASSISTANT

## 2023-11-03 PROCEDURE — 1126F AMNT PAIN NOTED NONE PRSNT: CPT | Performed by: PHYSICIAN ASSISTANT

## 2023-11-03 PROCEDURE — 99214 OFFICE O/P EST MOD 30 MIN: CPT | Performed by: PHYSICIAN ASSISTANT

## 2023-11-03 PROCEDURE — 1159F MED LIST DOCD IN RCRD: CPT | Performed by: PHYSICIAN ASSISTANT

## 2023-11-03 RX ORDER — ONDANSETRON 4 MG/1
4 TABLET, ORALLY DISINTEGRATING ORAL EVERY 12 HOURS PRN
Qty: 9 TABLET | Refills: 0 | Status: SHIPPED | OUTPATIENT
Start: 2023-11-03 | End: 2023-11-09 | Stop reason: ALTCHOICE

## 2023-11-03 RX ORDER — CIPROFLOXACIN 500 MG/1
500 TABLET ORAL 2 TIMES DAILY
Qty: 10 TABLET | Refills: 0 | Status: SHIPPED | OUTPATIENT
Start: 2023-11-03 | End: 2023-11-08

## 2023-11-03 RX ORDER — FAMOTIDINE 40 MG/1
40 TABLET, FILM COATED ORAL NIGHTLY
Qty: 90 TABLET | Refills: 1 | Status: SHIPPED | OUTPATIENT
Start: 2023-11-03 | End: 2023-11-14 | Stop reason: DRUGHIGH

## 2023-11-03 ASSESSMENT — PATIENT HEALTH QUESTIONNAIRE - PHQ9
2. FEELING DOWN, DEPRESSED OR HOPELESS: NOT AT ALL
1. LITTLE INTEREST OR PLEASURE IN DOING THINGS: NOT AT ALL
SUM OF ALL RESPONSES TO PHQ9 QUESTIONS 1 AND 2: 0

## 2023-11-03 NOTE — PROGRESS NOTES
Subjective     HPI   Janine Hendrix is a 78 y.o. year old female patient with presenting to clinic with concern for   Chief Complaint   Patient presents with    Vomiting     After she eats.  Is very hard to keep food down.taking her medication is the only thing she has been able to do.    Juan Miguel Santacruz presents to clinic with concern for 3 weeks of nausea and vomiting    Nausea and vomiting. Difficulty eating d/t nausea. Also feels some difficulty wanting to hydrating.   She had Dx zenker's diverticulum found on past EGD.  Hx oropharyngeal dysphagia.       Patient Active Problem List   Diagnosis    Abnormal skin growth    Anemia    Anxiety with depression    Aspiration pneumonia (CMS/HCC)    Bilateral carotid artery stenosis    BPPV (benign paroxysmal positional vertigo)    Changing skin lesion    Chest pain    Chronic fatigue and malaise    Chronic low back pain    Chronic right hip pain    Coronary artery disease involving native coronary artery of native heart without angina pectoris    Dementia without behavioral disturbance (CMS/HCC)    DM2 (diabetes mellitus, type 2) (CMS/HCC)    Dyslipidemia    Dysphagia, cricopharyngeal    Elevated TSH    Essential hypertension    GERD (gastroesophageal reflux disease)    Hip pain, right    History of heart artery stent    History of total mastectomy of left breast    Hypokalemia    Hypomagnesemia    Injury of right common femoral artery    Intraoperative complication involving circulatory system associated with cardiac catheterization    Left hip pain    Low back pain    Neck pain    Numbness    Oropharyngeal aspiration    Oropharyngeal dysphagia    Other fatigue    Overweight    PAD (peripheral artery disease) (CMS/HCC)    Pain of left upper extremity    Pain of right upper extremity    Panic attacks    Pernicious anemia    Right leg claudication (CMS/HCC)    Sensation, choking    SCC (squamous cell carcinoma)    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HCC)    Type 2  "diabetes mellitus without complication, without long-term current use of insulin (CMS/[facility])    Vitamin D deficiency    Zenker's diverticulum    Allergic rhinitis    Insomnia       Past Medical History:   Diagnosis Date    DM2 (diabetes mellitus, type 2) (CMS/[facility])     Dyslipidemia     Encounter for other preprocedural examination 10/19/2021    Pre-operative clearance    GERD (gastroesophageal reflux disease)     HTN (hypertension), benign     Pain in left hip 06/18/2020    Left hip pain    Personal history of other diseases of the respiratory system 11/30/2020    History of acute bronchitis      Past Surgical History:   Procedure Laterality Date    CT ABDOMEN PELVIS ANGIOGRAM W AND/OR WO IV CONTRAST  10/28/2020    CT ABDOMEN PELVIS ANGIOGRAM W AND/OR WO IV CONTRAST 10/28/2020 [facility]    OTHER SURGICAL HISTORY  11/18/2020    Vascular surgical procedure    OTHER SURGICAL HISTORY  11/18/2020    Cardiac catheterization      Family History   Problem Relation Name Age of Onset    No Known Problems Mother      No Known Problems Father      No Known Problems Other        Social History     Tobacco Use    Smoking status: Former     Packs/day: 0.50     Years: 5.00     Additional pack years: 0.00     Total pack years: 2.50     Types: Cigarettes    Smokeless tobacco: Never   Substance Use Topics    Alcohol use: Yes     Comment: rare        Current Outpatient Medications:     acetaminophen (Tylenol) 325 mg tablet, Take 1 tablet (325 mg) by mouth every 8 hours if needed for moderate pain (4 - 6)., Disp: , Rfl:     albuterol 90 mcg/actuation inhaler, INHALE 2 INHALATIONS BY MOUTH  EVERY 6 HOURS IF NEEDED FOR  SHORTNESS OF BREATH, Disp: 17 g, Rfl: 2    amLODIPine (Norvasc) 5 mg tablet, Take 1 tablet (5 mg) by mouth once daily., Disp: , Rfl:     aspirin 81 mg EC tablet, Take 1 tablet (81 mg) by mouth once daily., Disp: , Rfl:     BD Luer-Akilah Syringe 3 mL 22 x 1 1/2\" syringe, 1 every 2 weeks, Disp: , Rfl:     buPROPion XL " (Wellbutrin XL) 300 mg 24 hr tablet, Take 1 tablet (300 mg) by mouth once daily in the morning. Do not crush, chew, or split., Disp: 90 tablet, Rfl: 1    calcium carbonate (Os-Steve) 500 mg calcium (1,250 mg) chewable tablet, Chew 2 tablets (1,000 mg) 2 times a day., Disp: 360 tablet, Rfl: 1    clopidogrel (Plavix) 75 mg tablet, , Disp: , Rfl:     cyanocobalamin (Vitamin B-12) 500 mcg tablet, Take 2 tablets (1,000 mcg) by mouth once daily., Disp: 180 tablet, Rfl: 1    ergocalciferol (Vitamin D-2) 1.25 MG (83471 UT) capsule, Take 1 capsule (1,250 mcg) by mouth 1 (one) time per week., Disp: 12 capsule, Rfl: 3    evolocumab (Repatha Syringe) 140 mg/mL injection, Inject 1 mL (140 mg) under the skin every 14 (fourteen) days., Disp: , Rfl:     FreeStyle Test strip, Test twice daily as directed, Disp: 100 strip, Rfl: 1    hydrALAZINE (Apresoline) 50 mg tablet, Take 1 tablet (50 mg) by mouth 2 times a day., Disp: , Rfl:     ipratropium-albuteroL (Duo-Neb) 0.5-2.5 mg/3 mL nebulizer solution, Take 3 mL by nebulization 4 times a day as needed for wheezing or shortness of breath., Disp: 90 mL, Rfl: 3    isosorbide mononitrate ER (Imdur) 30 mg 24 hr tablet, Take 1 tablet (30 mg) by mouth once daily., Disp: , Rfl:     losartan (Cozaar) 50 mg tablet, Take 1 tablet (50 mg) by mouth once daily., Disp: , Rfl:     metFORMIN  mg 24 hr tablet, Take 1 tablet (500 mg) by mouth 2 times a day with meals., Disp: 180 tablet, Rfl: 1    metoprolol succinate XL (Toprol-XL) 100 mg 24 hr tablet, Take 1 tablet (100 mg) by mouth once daily., Disp: , Rfl:     nitroglycerin (Nitrostat) 0.4 mg SL tablet, Place 1 tablet (0.4 mg) under the tongue every 5 minutes if needed for chest pain., Disp: , Rfl:     pantoprazole (ProtoNix) 40 mg EC tablet, Take 1 tablet (40 mg) by mouth once daily in the morning. Take before meals. Do not crush, chew, or split., Disp: 90 tablet, Rfl: 3    sertraline (Zoloft) 50 mg tablet, TAKE 2 TABLETS BY MOUTH DAILY, Disp:  "180 tablet, Rfl: 0    syringe, disposable, (Carepoint Luer Slip Syringe) 1 mL syringe, , Disp: , Rfl:      Review of Systems  Constitutional: Denies fever  HEENT: Denies ST, earache  CVS: Denies Chest pain  Pulmonary: Denies wheezing, SOB  GI: Denies N/V  : Denies dysuria  Musculoskeletal:  Denies myalgia  Neuro: Denies focal weakness or numbness.  Skin: Denies Rashes.  *Review of Systems is negative unless otherwise mentioned in HPI or ROS above.    Objective   /60   Pulse 68   Temp 36.3 °C (97.3 °F)   Ht 1.651 m (5' 5\")   Wt 57.2 kg (126 lb 3.2 oz)   SpO2 97%   BMI 21.00 kg/m²  reviewed Body mass index is 21 kg/m².     Physical Exam  Constitutional: NAD.  Resting comfortably.  Head: Atraumatic, normocephalic.  ENT: Moist oral mucosa. Nasal mucosa wnl.   Cardiac: Regular rate & rhythm.   Pulmonary: Lungs clear bilat  GI: Soft, Nontender, nondistended. No guarding or rebound tenderness. No active vomiting in office.  Musculoskeletal: No peripheral edema.   Skin: No evidence of trauma. No rashes  Psych: Intact judgement and insight.    .Assessment/Plan   Problem List Items Addressed This Visit             ICD-10-CM    GERD (gastroesophageal reflux disease) K21.9    Relevant Medications    famotidine (Pepcid) 40 mg tablet    Other Relevant Orders    Referral to Gastroenterology     Other Visit Diagnoses         Codes    Nausea and vomiting, unspecified vomiting type    -  Primary R11.2    Relevant Medications    ondansetron ODT (Zofran-ODT) 4 mg disintegrating tablet    Other Relevant Orders    Referral to Gastroenterology            "

## 2023-11-04 ENCOUNTER — LAB (OUTPATIENT)
Dept: LAB | Facility: LAB | Age: 78
End: 2023-11-04
Payer: MEDICARE

## 2023-11-04 DIAGNOSIS — R11.2 NAUSEA AND VOMITING, UNSPECIFIED VOMITING TYPE: ICD-10-CM

## 2023-11-04 LAB
ALBUMIN SERPL BCP-MCNC: 4.1 G/DL (ref 3.4–5)
ALP SERPL-CCNC: 60 U/L (ref 33–136)
ALT SERPL W P-5'-P-CCNC: 13 U/L (ref 7–45)
ANION GAP SERPL CALC-SCNC: 20 MMOL/L (ref 10–20)
AST SERPL W P-5'-P-CCNC: 16 U/L (ref 9–39)
BILIRUB SERPL-MCNC: 0.5 MG/DL (ref 0–1.2)
BUN SERPL-MCNC: 25 MG/DL (ref 6–23)
CALCIUM SERPL-MCNC: 9.4 MG/DL (ref 8.6–10.3)
CHLORIDE SERPL-SCNC: 103 MMOL/L (ref 98–107)
CO2 SERPL-SCNC: 26 MMOL/L (ref 21–32)
CREAT SERPL-MCNC: 0.83 MG/DL (ref 0.5–1.05)
ERYTHROCYTE [DISTWIDTH] IN BLOOD BY AUTOMATED COUNT: 13.7 % (ref 11.5–14.5)
GFR SERPL CREATININE-BSD FRML MDRD: 73 ML/MIN/1.73M*2
GLUCOSE SERPL-MCNC: 101 MG/DL (ref 74–99)
HCT VFR BLD AUTO: 40.2 % (ref 36–46)
HGB BLD-MCNC: 13.3 G/DL (ref 12–16)
LIPASE SERPL-CCNC: 32 U/L (ref 9–82)
MCH RBC QN AUTO: 30.5 PG (ref 26–34)
MCHC RBC AUTO-ENTMCNC: 33.1 G/DL (ref 32–36)
MCV RBC AUTO: 92 FL (ref 80–100)
NRBC BLD-RTO: 0 /100 WBCS (ref 0–0)
PLATELET # BLD AUTO: 224 X10*3/UL (ref 150–450)
POTASSIUM SERPL-SCNC: 4.5 MMOL/L (ref 3.5–5.3)
PROT SERPL-MCNC: 6.6 G/DL (ref 6.4–8.2)
RBC # BLD AUTO: 4.36 X10*6/UL (ref 4–5.2)
SODIUM SERPL-SCNC: 144 MMOL/L (ref 136–145)
WBC # BLD AUTO: 6.5 X10*3/UL (ref 4.4–11.3)

## 2023-11-04 PROCEDURE — 36415 COLL VENOUS BLD VENIPUNCTURE: CPT

## 2023-11-04 PROCEDURE — 85027 COMPLETE CBC AUTOMATED: CPT

## 2023-11-04 PROCEDURE — 83690 ASSAY OF LIPASE: CPT

## 2023-11-04 PROCEDURE — 80053 COMPREHEN METABOLIC PANEL: CPT

## 2023-11-05 DIAGNOSIS — R11.14 BILIOUS VOMITING WITH NAUSEA: Primary | ICD-10-CM

## 2023-11-05 LAB — BACTERIA UR CULT: NORMAL

## 2023-11-07 ENCOUNTER — APPOINTMENT (OUTPATIENT)
Dept: CARDIOLOGY | Facility: CLINIC | Age: 78
End: 2023-11-07
Payer: MEDICARE

## 2023-11-09 ENCOUNTER — OFFICE VISIT (OUTPATIENT)
Dept: CARDIOLOGY | Facility: HOSPITAL | Age: 78
End: 2023-11-09
Payer: MEDICARE

## 2023-11-09 VITALS
BODY MASS INDEX: 20.98 KG/M2 | OXYGEN SATURATION: 96 % | HEART RATE: 62 BPM | SYSTOLIC BLOOD PRESSURE: 165 MMHG | DIASTOLIC BLOOD PRESSURE: 81 MMHG | WEIGHT: 126.1 LBS

## 2023-11-09 DIAGNOSIS — I10 ESSENTIAL HYPERTENSION: Primary | ICD-10-CM

## 2023-11-09 DIAGNOSIS — E78.5 DYSLIPIDEMIA: ICD-10-CM

## 2023-11-09 DIAGNOSIS — I25.10 CORONARY ARTERY DISEASE INVOLVING NATIVE CORONARY ARTERY OF NATIVE HEART WITHOUT ANGINA PECTORIS: ICD-10-CM

## 2023-11-09 DIAGNOSIS — I25.10 CORONARY ARTERY DISEASE INVOLVING NATIVE CORONARY ARTERY OF NATIVE HEART WITHOUT ANGINA PECTORIS: Primary | ICD-10-CM

## 2023-11-09 DIAGNOSIS — Z95.5 HISTORY OF HEART ARTERY STENT: ICD-10-CM

## 2023-11-09 PROCEDURE — 1036F TOBACCO NON-USER: CPT | Performed by: INTERNAL MEDICINE

## 2023-11-09 PROCEDURE — 3077F SYST BP >= 140 MM HG: CPT | Performed by: INTERNAL MEDICINE

## 2023-11-09 PROCEDURE — 1160F RVW MEDS BY RX/DR IN RCRD: CPT | Performed by: INTERNAL MEDICINE

## 2023-11-09 PROCEDURE — 99214 OFFICE O/P EST MOD 30 MIN: CPT | Mod: 25,PO | Performed by: INTERNAL MEDICINE

## 2023-11-09 PROCEDURE — 1159F MED LIST DOCD IN RCRD: CPT | Performed by: INTERNAL MEDICINE

## 2023-11-09 PROCEDURE — 3079F DIAST BP 80-89 MM HG: CPT | Performed by: INTERNAL MEDICINE

## 2023-11-09 PROCEDURE — 99214 OFFICE O/P EST MOD 30 MIN: CPT | Performed by: INTERNAL MEDICINE

## 2023-11-09 PROCEDURE — 93005 ELECTROCARDIOGRAM TRACING: CPT | Mod: PO | Performed by: INTERNAL MEDICINE

## 2023-11-09 PROCEDURE — 1126F AMNT PAIN NOTED NONE PRSNT: CPT | Performed by: INTERNAL MEDICINE

## 2023-11-09 PROCEDURE — 93010 ELECTROCARDIOGRAM REPORT: CPT | Performed by: INTERNAL MEDICINE

## 2023-11-09 NOTE — PROGRESS NOTES
"History Of Present Illness:    Neeta Hendrix is a 78 y.o. female history of CAD, carotid artery stenosis, DM, HTN and HLD, PCI 2022 she is here for a follow up visit. She has been experiencing stomach issues over the few months, reflux, vomiting, she will be scheduling a scope with GI soon. Denies chest pain, shortness of breath, heart palpations, dizziness or orthopnea.     Review of Systems   Constitutional: Negative.   HENT: Negative.     Eyes: Negative.    Cardiovascular: Negative.    Respiratory: Negative.     Endocrine: Negative.    Skin: Negative.    Musculoskeletal: Negative.    Neurological: Negative.    Psychiatric/Behavioral: Negative.     Allergic/Immunologic: Negative.    All other systems reviewed and are negative.       Last Recorded Vitals:      4/21/2023     8:28 AM 5/8/2023     9:11 AM 5/12/2023    10:20 AM 7/26/2023     1:00 PM 9/27/2023     1:06 PM 11/3/2023    11:21 AM 11/9/2023    10:53 AM   Vitals   Systolic 163  149 118 120 110 165   Diastolic 87  74 60 40 60 81   Heart Rate 85  72 66 72 68 62   Temp    37 °C (98.6 °F) 37 °C (98.6 °F) 36.3 °C (97.3 °F)    Height (in) 1.651 m (5' 5\") 1.651 m (5' 5\") 1.651 m (5' 5\") 1.651 m (5' 5\") 1.651 m (5' 5\") 1.651 m (5' 5\")    Weight (lb) 126.76 128.09 126.98 123.8 123 126.2 126.1   BMI 21.09 kg/m2 21.31 kg/m2 21.13 kg/m2 20.6 kg/m2 20.47 kg/m2 21 kg/m2 20.98 kg/m2   BSA (m2) 1.62 m2 1.63 m2 1.63 m2 1.61 m2 1.6 m2 1.62 m2 1.62 m2   Visit Report    Report Report Report Report         Past Medical History:  She has a past medical history of DM2 (diabetes mellitus, type 2) (CMS/[facility]), Dyslipidemia, Encounter for other preprocedural examination (10/19/2021), GERD (gastroesophageal reflux disease), HTN (hypertension), benign, Pain in left hip (06/18/2020), and Personal history of other diseases of the respiratory system (11/30/2020).    Past Surgical History:  She has a past surgical history that includes Other surgical history (11/18/2020); Other surgical " "history (11/18/2020); and CT angio abdomen pelvis w and or wo IV IV contrast (10/28/2020).      Social History:  She reports that she has quit smoking. Her smoking use included cigarettes. She has a 2.50 pack-year smoking history. She has never used smokeless tobacco. She reports current alcohol use. She reports that she does not use drugs.    Family History:  Family History   Problem Relation Name Age of Onset    No Known Problems Mother      No Known Problems Father      No Known Problems Other          Allergies:  Adhesive tape-silicones and Bloodstop bandage    Outpatient Medications:  Current Outpatient Medications   Medication Instructions    acetaminophen (TYLENOL) 325 mg, oral, Every 8 hours PRN    albuterol 90 mcg/actuation inhaler INHALE 2 INHALATIONS BY MOUTH  EVERY 6 HOURS IF NEEDED FOR  SHORTNESS OF BREATH    amLODIPine (NORVASC) 5 mg, oral, Daily    aspirin 81 mg, oral, Daily    BD Luer-Akilah Syringe 3 mL 22 x 1 1/2\" syringe 1 every 2 weeks    buPROPion XL (WELLBUTRIN XL) 300 mg, oral, Every morning, Do not crush, chew, or split.    calcium carbonate 1,000 mg, oral, 2 times daily    clopidogrel (Plavix) 75 mg tablet     cyanocobalamin (VITAMIN B-12) 1,000 mcg, oral, Daily    ergocalciferol (VITAMIN D-2) 1,250 mcg, oral, Weekly    famotidine (PEPCID) 40 mg, oral, Nightly    FreeStyle Test strip Test twice daily as directed    hydrALAZINE (APRESOLINE) 50 mg, oral, 2 times daily    ipratropium-albuteroL (Duo-Neb) 0.5-2.5 mg/3 mL nebulizer solution 3 mL, nebulization, 4 times daily PRN    isosorbide mononitrate ER (Imdur) 30 mg 24 hr tablet 1 tablet, oral, Daily    losartan (Cozaar) 50 mg tablet 1 tablet, oral, Daily    metFORMIN XR (GLUCOPHAGE-XR) 500 mg, oral, 2 times daily with meals    metoprolol succinate XL (Toprol-XL) 100 mg 24 hr tablet 1 tablet, oral, Daily    nitroglycerin (NITROSTAT) 0.4 mg, sublingual, Every 5 min PRN    ondansetron ODT (ZOFRAN-ODT) 4 mg, oral, Every 12 hours PRN    pantoprazole " (PROTONIX) 40 mg, oral, Daily before breakfast, Do not crush, chew, or split.    Repatha Syringe 140 mg, subcutaneous, Every 14 days    sertraline (Zoloft) 50 mg tablet TAKE 2 TABLETS BY MOUTH DAILY    syringe, disposable, (Carepoint Luer Slip Syringe) 1 mL syringe miscellaneous       Physical Exam  Vitals reviewed.   HENT:      Head: Normocephalic.      Nose: Nose normal.   Eyes:      Pupils: Pupils are equal, round, and reactive to light.   Cardiovascular:      Rate and Rhythm: Normal rate and regular rhythm.   Pulmonary:      Effort: Pulmonary effort is normal.      Breath sounds: Normal breath sounds.   Abdominal:      General: Abdomen is flat.      Palpations: Abdomen is soft.   Musculoskeletal:         General: Normal range of motion.      Cervical back: Normal range of motion.   Skin:     General: Skin is warm and dry.   Neurological:      General: No focal deficit present.      Mental Status: He is alert and oriented to person, place, and time.   Psychiatric:         Mood and Affect: Mood normal.         Behavior: Behavior normal.        Last Labs:  CBC -  Lab Results   Component Value Date    WBC 6.5 11/04/2023    HGB 13.3 11/04/2023    HCT 40.2 11/04/2023    MCV 92 11/04/2023     11/04/2023       CMP -  Lab Results   Component Value Date    CALCIUM 9.4 11/04/2023    PHOS 3.2 12/08/2021    PROT 6.6 11/04/2023    ALBUMIN 4.1 11/04/2023    AST 16 11/04/2023    ALT 13 11/04/2023    ALKPHOS 60 11/04/2023    BILITOT 0.5 11/04/2023       LIPID PANEL -   Lab Results   Component Value Date    CHOL 149 03/20/2023    TRIG 164 (H) 03/20/2023    HDL 68.4 03/20/2023    CHHDL 2.2 03/20/2023    LDLF 48 03/20/2023    VLDL 33 03/20/2023    NHDL 81 12/20/2021       RENAL FUNCTION PANEL -   Lab Results   Component Value Date    GLUCOSE 101 (H) 11/04/2023     11/04/2023    K 4.5 11/04/2023     11/04/2023    CO2 26 11/04/2023    ANIONGAP 20 11/04/2023    BUN 25 (H) 11/04/2023    CREATININE 0.83 11/04/2023     CALCIUM 9.4 11/04/2023    PHOS 3.2 12/08/2021    ALBUMIN 4.1 11/04/2023        Lab Results   Component Value Date     (H) 10/05/2020    HGBA1C 6.8 (A) 09/27/2023       Last Cardiology Tests:  Heart cath 4/24/2023  1. Left main: short/near separate LAD/LCx ostium.  2. LAD: 30% ostial/angulated disease, patent mid-vessel stent.  3. LCx: 95% ostial stenosis, patent proximal-mid vessel stent complex.  4. RCA: 30% proximal ISR.  5. LVEDP 19mmHg, no aortic stenosis on LV-Ao gradient.  6. FFR of LAD = 0.91.  7. Successful PCI to severe ostial LCx stenosis (into LM ostium) with one [place] NERIS.        Echo 12/2021:  1. The left ventricular systolic function is normal with a 50-55% estimated ejection fraction.  2. Spectral Doppler shows an impaired relaxation pattern of left ventricular diastolic filling.  3. Aortic valve sclerosis.     [facility]: 10/30/2020  CONCLUSIONS:  1. Successful PCI to severe proximal LCx stenosis with a 2.75 x 18 mm Resolute  NERIS post-dilated with a 2.75 mm NC balloon at 22 leonila.  2. Successful PCI to severe mid-LAD stenosis with a 2.5 x 34 mm Resolute NERIS  post-dilated distally with a 2.5 mm NC balloon at 22 leonila and proximally with a  2.75 mm NC balloon at 20-24 leonila.     Assessment/Plan   Very pleasant 78 year old female with a history of severe multi-vessel CAD with PCI to LCx and LAD in 10/2020, carotid stenosis, DM, HTN, HLD and spinal stenosis. She had a heart cath on 4/24/2023 with a PCI to LCx.     She doing well from a cardiovascular standpoint, euvolemic on exam. Blood pressure elevated today but she was not able to take her medication this morning due to vomiting, she states eventually she is able to take them and takes them daily. Last home blood pressure 132/77, heart rate well controlled. If problems persist we can discuss medication changes.      Plan   -continue Aspirin 81mg daily indefinitely   -continue Hydralazine 50mg twice a day   -continue Imdur 30mg daily please take in  the evening   -continue repatha, metoprolol and 5mg Amlodipine   -follow up in six months or sooner if needed     Sonido Escalante MD

## 2023-11-13 LAB
ATRIAL RATE: 60 BPM
P AXIS: 48 DEGREES
P OFFSET: 194 MS
P ONSET: 148 MS
PR INTERVAL: 134 MS
Q ONSET: 215 MS
QRS COUNT: 10 BEATS
QRS DURATION: 90 MS
QT INTERVAL: 418 MS
QTC CALCULATION(BAZETT): 418 MS
QTC FREDERICIA: 418 MS
R AXIS: 14 DEGREES
T AXIS: 62 DEGREES
T OFFSET: 424 MS
VENTRICULAR RATE: 60 BPM

## 2023-11-14 ENCOUNTER — OFFICE VISIT (OUTPATIENT)
Dept: GASTROENTEROLOGY | Facility: CLINIC | Age: 78
End: 2023-11-14
Payer: MEDICARE

## 2023-11-14 ENCOUNTER — TELEPHONE (OUTPATIENT)
Dept: CARDIOLOGY | Facility: HOSPITAL | Age: 78
End: 2023-11-14

## 2023-11-14 VITALS
BODY MASS INDEX: 22.06 KG/M2 | HEART RATE: 66 BPM | RESPIRATION RATE: 15 BRPM | HEIGHT: 63 IN | OXYGEN SATURATION: 96 % | SYSTOLIC BLOOD PRESSURE: 126 MMHG | DIASTOLIC BLOOD PRESSURE: 88 MMHG | WEIGHT: 124.5 LBS

## 2023-11-14 DIAGNOSIS — K21.9 GASTROESOPHAGEAL REFLUX DISEASE, UNSPECIFIED WHETHER ESOPHAGITIS PRESENT: Primary | ICD-10-CM

## 2023-11-14 DIAGNOSIS — R11.2 NAUSEA AND VOMITING, UNSPECIFIED VOMITING TYPE: ICD-10-CM

## 2023-11-14 PROCEDURE — 3079F DIAST BP 80-89 MM HG: CPT

## 2023-11-14 PROCEDURE — 1160F RVW MEDS BY RX/DR IN RCRD: CPT

## 2023-11-14 PROCEDURE — 1036F TOBACCO NON-USER: CPT

## 2023-11-14 PROCEDURE — 1159F MED LIST DOCD IN RCRD: CPT

## 2023-11-14 PROCEDURE — 1126F AMNT PAIN NOTED NONE PRSNT: CPT

## 2023-11-14 PROCEDURE — 3074F SYST BP LT 130 MM HG: CPT

## 2023-11-14 PROCEDURE — 99214 OFFICE O/P EST MOD 30 MIN: CPT

## 2023-11-14 RX ORDER — PANTOPRAZOLE SODIUM 40 MG/1
40 TABLET, DELAYED RELEASE ORAL
COMMUNITY
End: 2023-11-14 | Stop reason: SDUPTHER

## 2023-11-14 RX ORDER — PANTOPRAZOLE SODIUM 40 MG/1
TABLET, DELAYED RELEASE ORAL
Qty: 180 TABLET | Refills: 0 | Status: SHIPPED | OUTPATIENT
Start: 2023-11-14 | End: 2024-03-29

## 2023-11-14 RX ORDER — PANTOPRAZOLE SODIUM 40 MG/1
40 TABLET, DELAYED RELEASE ORAL 2 TIMES DAILY
Qty: 60 TABLET | Refills: 2 | Status: CANCELLED | OUTPATIENT
Start: 2023-11-14 | End: 2024-02-12

## 2023-11-14 RX ORDER — FAMOTIDINE 20 MG/1
20 TABLET, FILM COATED ORAL 2 TIMES DAILY
Qty: 60 TABLET | Refills: 2 | Status: SHIPPED | OUTPATIENT
Start: 2023-11-14 | End: 2024-01-15

## 2023-11-14 ASSESSMENT — ENCOUNTER SYMPTOMS
ROS GI COMMENTS: SEE HPI
NAUSEA: 1
DIAPHORESIS: 0
UNEXPECTED WEIGHT CHANGE: 0
BLOOD IN STOOL: 0
TROUBLE SWALLOWING: 1
ABDOMINAL PAIN: 0
FATIGUE: 0
ANAL BLEEDING: 0
ARTHRALGIAS: 0
ABDOMINAL DISTENTION: 0
HEADACHES: 0
WEAKNESS: 0
APPETITE CHANGE: 0
SHORTNESS OF BREATH: 0
FEVER: 0
RECTAL PAIN: 0
CONSTIPATION: 0
CHILLS: 0
DIARRHEA: 0
VOMITING: 1

## 2023-11-14 NOTE — PATIENT INSTRUCTIONS
EGD- will need clearance from Dr. Escalante's office since she is on Plavix  Split famotidine dose to 20mg twice daily  Increase Protonix to twice daily x2 months then decrease back to once daily  Follow-up after scope is completed

## 2023-11-14 NOTE — PROGRESS NOTES
"History Of Present Illness  Janine Hendrix \"Lynette" is a 78 y.o. female presenting with Nausea and Vomiting (New patient here has been very queasy, and has been vomiting. Patient states has had nausea and when vomiting its mostly phlegm with food.). She is here with her daughter today    Patient has had issues with vomiting for over a month now.  Initially, she had a few days of vomiting which resolved, so she thought she had a stomach bug but then symptoms returned.  Patient will vomit multiple times in a row.  She has reflux and throws up phlegm, and if she eats at all there will be food in emesis.  She saw her PCP for symptoms and was started on 40 mg Pepcid nightly and referred to GI for possible EGD.  Patient and daughter note that over the last few days vomiting is better but not gone but patient has been taking Zofran for the nausea.  She is also on Protonix daily, unable to take omeprazole due to starting Plavix for recent stent placement in April 2023.    She has longstanding dysphagia, often times will try to eat and food will shoot right back out.      Social History  She reports that she has quit smoking. Her smoking use included cigarettes. She has a 2.50 pack-year smoking history. She has never used smokeless tobacco. She reports current alcohol use. She reports that she does not use drugs.      Family History  Family History   Problem Relation Name Age of Onset    No Known Problems Mother      No Known Problems Father      No Known Problems Other       The patient does not have a FH of CRC or IBD    Review of Systems   Constitutional:  Negative for appetite change, chills, diaphoresis, fatigue, fever and unexpected weight change.   HENT:  Positive for trouble swallowing. Negative for mouth sores.    Respiratory:  Negative for shortness of breath.    Cardiovascular:  Negative for chest pain.   Gastrointestinal:  Positive for nausea and vomiting. Negative for abdominal distention, abdominal pain, anal " "bleeding, blood in stool, constipation, diarrhea and rectal pain.        See HPI   Musculoskeletal:  Negative for arthralgias and gait problem.   Neurological:  Negative for weakness and headaches.   All other systems reviewed and are negative.        Physical Exam  Constitutional:       Appearance: Normal appearance.   HENT:      Head: Normocephalic and atraumatic.      Mouth/Throat:      Mouth: Mucous membranes are moist.   Eyes:      Extraocular Movements: Extraocular movements intact.      Conjunctiva/sclera: Conjunctivae normal.      Pupils: Pupils are equal, round, and reactive to light.   Pulmonary:      Effort: Pulmonary effort is normal.   Abdominal:      General: Bowel sounds are normal. There is no distension.      Palpations: Abdomen is soft. There is no mass.      Tenderness: There is no abdominal tenderness. There is no guarding or rebound.      Hernia: No hernia is present.   Musculoskeletal:         General: Normal range of motion.      Cervical back: Normal range of motion.      Right lower leg: No edema.      Left lower leg: No edema.   Skin:     General: Skin is warm and dry.      Coloration: Skin is not jaundiced or pale.   Neurological:      General: No focal deficit present.      Mental Status: She is alert and oriented to person, place, and time. Mental status is at baseline.   Psychiatric:         Mood and Affect: Mood normal.         Behavior: Behavior normal.         Thought Content: Thought content normal.         Judgment: Judgment normal.          Last Vital Signs  /88 (BP Location: Left arm, Patient Position: Sitting, BP Cuff Size: Adult)   Pulse 66   Resp 15   Ht 1.6 m (5' 3\")   Wt 56.5 kg (124 lb 8 oz)   SpO2 96%   BMI 22.05 kg/m²      Relevant Results  Lab Results   Component Value Date    WBC 6.5 11/04/2023    HGB 13.3 11/04/2023    HCT 40.2 11/04/2023    MCV 92 11/04/2023     11/04/2023      Lab Results   Component Value Date    GLUCOSE 101 (H) 11/04/2023    " CALCIUM 9.4 11/04/2023     11/04/2023    K 4.5 11/04/2023    CO2 26 11/04/2023     11/04/2023    BUN 25 (H) 11/04/2023    CREATININE 0.83 11/04/2023      Lab Results   Component Value Date    ALT 13 11/04/2023    AST 16 11/04/2023    ALKPHOS 60 11/04/2023    BILITOT 0.5 11/04/2023    BILIDIR 0.2 11/29/2021    INR 1.1 05/06/2022      Lab Results   Component Value Date    IRON 105 09/26/2022    TIBC 320 09/26/2022    IRONSAT 33 09/26/2022    FERRITIN 209 (H) 09/26/2022    [id number] 03/20/2023    FOLATE 21.1 03/20/2023       Lab Results   Component Value Date    CRP 0.5 11/28/2021   A1C 6.8  Esophageal manometry 9/15/2020-   Impressions       Overall the patient has a normal motiity with a hiatal hernia. The LES        relaxes and esophageal body is normal. The IRP is elevated but this is        likely due to hiata       IMPEDANCE:       - There was complete bolus clearance in 100 % of swallows (normal > 70        percent).  EGD 9/15/2020 with Dr. Ordonez-  LA grade a reflux esophagitis, biopsies noted acute and chronic inflammation, no dysplasia. small hiatal hernia.  Chronic gastritis, normal duodenum.  EGD 8/7/2019 with Dr. Kamara-  Findings:  ESOPHAGUS: The esophageal mucosa appeared normal including the Z line at 35 cm. There was evidence of erosive gastritis with a prepyloric clean-based ulcer identified.  The ulcer was shallow measuring around 6 mm in diameter.  The ulcer at the typical appearance of a peptic ulcer related to NSAIDs. Biopsies negative for h. Pylori. The duodenum appeared normal to D3  Repeat EGD 10/10/2019 with Dr. Kamara- single gastic erosion, no evidence of previously noted gastric ulcer.  Gastric biopsy with minimal inflammation, no H. pylori  Last colonoscopy 11/13/15 with Dr. Kamara-due in 2025    Assessment/Plan   -Nausea and vomiting may be due to multiple etiologies, including recent infection/illness, polypharmacy but since patient has history gastric ulcer, will order  EGD for further evaluation  -EGD- will need clearance from Dr. Escalante's office since she is on Plavix. My MA reached out to his office, we will call the patient to schedule if he clears her.  -Split famotidine dose to 20mg twice daily  -Increase Protonix to twice daily x2 months then decrease back to once daily  -She does have a history of diabetes, at this time gastroparesis is unlikely since nausea and vomiting does not occur with food intake  Problem List Items Addressed This Visit       GERD (gastroesophageal reflux disease) - Primary    Relevant Medications    famotidine (Pepcid) 20 mg tablet    pantoprazole (ProtoNix) 40 mg EC tablet    Other Relevant Orders    EGD     Other Visit Diagnoses       Nausea and vomiting, unspecified vomiting type        Relevant Medications    pantoprazole (ProtoNix) 40 mg EC tablet            KAVON Dooley-CNP

## 2023-11-22 ENCOUNTER — HOSPITAL ENCOUNTER (OUTPATIENT)
Dept: RADIOLOGY | Facility: HOSPITAL | Age: 78
Discharge: HOME | End: 2023-11-22
Payer: MEDICARE

## 2023-11-22 DIAGNOSIS — R11.14 BILIOUS VOMITING WITH NAUSEA: ICD-10-CM

## 2023-11-22 PROCEDURE — 76705 ECHO EXAM OF ABDOMEN: CPT | Performed by: RADIOLOGY

## 2023-11-22 PROCEDURE — 76705 ECHO EXAM OF ABDOMEN: CPT

## 2023-11-27 DIAGNOSIS — R11.2 NAUSEA AND VOMITING, UNSPECIFIED VOMITING TYPE: Primary | ICD-10-CM

## 2023-11-27 DIAGNOSIS — R10.11 RIGHT UPPER QUADRANT ABDOMINAL PAIN: ICD-10-CM

## 2023-11-27 NOTE — RESULT ENCOUNTER NOTE
Polyp of gallbladder  (noncancerous) without stones. CT scan abdomen ordered d/t vomiting and poor imaging of pancreas. Please call to schedule.

## 2023-11-28 ENCOUNTER — APPOINTMENT (OUTPATIENT)
Dept: RADIOLOGY | Facility: HOSPITAL | Age: 78
End: 2023-11-28
Payer: MEDICARE

## 2023-12-01 ENCOUNTER — HOSPITAL ENCOUNTER (OUTPATIENT)
Dept: RADIOLOGY | Facility: HOSPITAL | Age: 78
Discharge: HOME | End: 2023-12-01
Payer: MEDICARE

## 2023-12-01 DIAGNOSIS — R10.11 RIGHT UPPER QUADRANT ABDOMINAL PAIN: ICD-10-CM

## 2023-12-01 DIAGNOSIS — R11.2 NAUSEA AND VOMITING, UNSPECIFIED VOMITING TYPE: ICD-10-CM

## 2023-12-01 PROCEDURE — 2550000001 HC RX 255 CONTRASTS: Performed by: PHYSICIAN ASSISTANT

## 2023-12-01 PROCEDURE — 74177 CT ABD & PELVIS W/CONTRAST: CPT | Performed by: STUDENT IN AN ORGANIZED HEALTH CARE EDUCATION/TRAINING PROGRAM

## 2023-12-01 PROCEDURE — 74160 CT ABDOMEN W/CONTRAST: CPT

## 2023-12-01 RX ADMIN — IOHEXOL 75 ML: 350 INJECTION, SOLUTION INTRAVENOUS at 08:37

## 2023-12-04 DIAGNOSIS — R11.2 NAUSEA AND VOMITING, UNSPECIFIED VOMITING TYPE: ICD-10-CM

## 2023-12-04 DIAGNOSIS — K86.89 DILATION OF PANCREATIC DUCT (HHS-HCC): Primary | ICD-10-CM

## 2023-12-04 DIAGNOSIS — J18.9 COMMUNITY ACQUIRED PNEUMONIA OF RIGHT MIDDLE LOBE OF LUNG: ICD-10-CM

## 2023-12-04 RX ORDER — AMOXICILLIN AND CLAVULANATE POTASSIUM 875; 125 MG/1; MG/1
875 TABLET, FILM COATED ORAL 2 TIMES DAILY
Qty: 20 TABLET | Refills: 0 | Status: SHIPPED | OUTPATIENT
Start: 2023-12-04 | End: 2023-12-14

## 2023-12-04 NOTE — RESULT ENCOUNTER NOTE
Discussed with Neeta  by phone  -Abnormal dilation pancreatic duct 6mm, no obstructing mass or gallstones, but needs MRCP for further evaluation- I have ordered this, please call to schedule. Follow up with Dr. Ramirez for further evaluation and care.   -Infrarenal Aortic aneurysm enlarged from 3.7cm to 4.1cm.  - Lung, R middle lobe ground glass opacity noted- will F/up 3-6 mo Has had coughing for a couple of weeks- will treat with Augmentin and follow up

## 2023-12-08 ENCOUNTER — HOSPITAL ENCOUNTER (OUTPATIENT)
Dept: RADIOLOGY | Facility: HOSPITAL | Age: 78
Discharge: HOME | End: 2023-12-08
Payer: MEDICARE

## 2023-12-08 DIAGNOSIS — Z12.31 ENCOUNTER FOR SCREENING MAMMOGRAM FOR BREAST CANCER: ICD-10-CM

## 2023-12-08 PROCEDURE — 77067 SCR MAMMO BI INCL CAD: CPT | Mod: RIGHT SIDE | Performed by: RADIOLOGY

## 2023-12-08 PROCEDURE — 77067 SCR MAMMO BI INCL CAD: CPT | Mod: RT

## 2023-12-08 PROCEDURE — 77063 BREAST TOMOSYNTHESIS BI: CPT | Mod: RIGHT SIDE | Performed by: RADIOLOGY

## 2023-12-11 ENCOUNTER — HOSPITAL ENCOUNTER (OUTPATIENT)
Dept: RADIOLOGY | Facility: HOSPITAL | Age: 78
Discharge: HOME | End: 2023-12-11
Payer: MEDICARE

## 2023-12-11 DIAGNOSIS — K86.89 DILATION OF PANCREATIC DUCT (HHS-HCC): ICD-10-CM

## 2023-12-11 DIAGNOSIS — R11.2 NAUSEA AND VOMITING, UNSPECIFIED VOMITING TYPE: ICD-10-CM

## 2023-12-11 PROCEDURE — 74183 MRI ABD W/O CNTR FLWD CNTR: CPT | Performed by: STUDENT IN AN ORGANIZED HEALTH CARE EDUCATION/TRAINING PROGRAM

## 2023-12-11 PROCEDURE — 74183 MRI ABD W/O CNTR FLWD CNTR: CPT

## 2023-12-11 PROCEDURE — 76376 3D RENDER W/INTRP POSTPROCES: CPT | Performed by: STUDENT IN AN ORGANIZED HEALTH CARE EDUCATION/TRAINING PROGRAM

## 2023-12-11 PROCEDURE — A9575 INJ GADOTERATE MEGLUMI 0.1ML: HCPCS | Performed by: PHYSICIAN ASSISTANT

## 2023-12-11 PROCEDURE — 2550000001 HC RX 255 CONTRASTS: Performed by: PHYSICIAN ASSISTANT

## 2023-12-11 RX ORDER — GADOTERATE MEGLUMINE 376.9 MG/ML
13 INJECTION INTRAVENOUS
Status: COMPLETED | OUTPATIENT
Start: 2023-12-11 | End: 2023-12-11

## 2023-12-11 RX ADMIN — GADOTERATE MEGLUMINE 13 ML: 376.9 INJECTION INTRAVENOUS at 08:41

## 2023-12-18 ENCOUNTER — OFFICE VISIT (OUTPATIENT)
Dept: GASTROENTEROLOGY | Facility: CLINIC | Age: 78
End: 2023-12-18
Payer: MEDICARE

## 2023-12-18 ENCOUNTER — LAB (OUTPATIENT)
Dept: LAB | Facility: LAB | Age: 78
End: 2023-12-18
Payer: MEDICARE

## 2023-12-18 VITALS
OXYGEN SATURATION: 97 % | HEIGHT: 63 IN | HEART RATE: 72 BPM | SYSTOLIC BLOOD PRESSURE: 142 MMHG | WEIGHT: 148 LBS | BODY MASS INDEX: 26.22 KG/M2 | DIASTOLIC BLOOD PRESSURE: 80 MMHG

## 2023-12-18 DIAGNOSIS — R11.2 NAUSEA AND VOMITING, UNSPECIFIED VOMITING TYPE: ICD-10-CM

## 2023-12-18 DIAGNOSIS — I10 ESSENTIAL HYPERTENSION: ICD-10-CM

## 2023-12-18 DIAGNOSIS — K86.89 DILATION OF PANCREATIC DUCT (HHS-HCC): ICD-10-CM

## 2023-12-18 DIAGNOSIS — R11.2 NAUSEA AND VOMITING, UNSPECIFIED VOMITING TYPE: Primary | ICD-10-CM

## 2023-12-18 PROCEDURE — 3077F SYST BP >= 140 MM HG: CPT

## 2023-12-18 PROCEDURE — 1160F RVW MEDS BY RX/DR IN RCRD: CPT

## 2023-12-18 PROCEDURE — 1159F MED LIST DOCD IN RCRD: CPT

## 2023-12-18 PROCEDURE — 1036F TOBACCO NON-USER: CPT

## 2023-12-18 PROCEDURE — 1126F AMNT PAIN NOTED NONE PRSNT: CPT

## 2023-12-18 PROCEDURE — 99215 OFFICE O/P EST HI 40 MIN: CPT

## 2023-12-18 PROCEDURE — 3079F DIAST BP 80-89 MM HG: CPT

## 2023-12-18 RX ORDER — HYDRALAZINE HYDROCHLORIDE 50 MG/1
50 TABLET, FILM COATED ORAL 2 TIMES DAILY
Qty: 180 TABLET | Refills: 3 | Status: SHIPPED | OUTPATIENT
Start: 2023-12-18

## 2023-12-18 RX ORDER — METOPROLOL SUCCINATE 100 MG/1
100 TABLET, EXTENDED RELEASE ORAL DAILY
Qty: 90 TABLET | Refills: 3 | Status: SHIPPED | OUTPATIENT
Start: 2023-12-18

## 2023-12-18 RX ORDER — AMLODIPINE BESYLATE 5 MG/1
5 TABLET ORAL DAILY
Qty: 90 TABLET | Refills: 3 | Status: SHIPPED | OUTPATIENT
Start: 2023-12-18

## 2023-12-18 ASSESSMENT — ENCOUNTER SYMPTOMS
NAUSEA: 1
BLOOD IN STOOL: 0
DIAPHORESIS: 0
ABDOMINAL PAIN: 0
CONSTIPATION: 0
CHILLS: 0
BACK PAIN: 1
TROUBLE SWALLOWING: 0
FATIGUE: 0
ABDOMINAL DISTENTION: 0
ANAL BLEEDING: 0
ROS GI COMMENTS: SEE HPI
DIARRHEA: 0
VOMITING: 1
APPETITE CHANGE: 0
UNEXPECTED WEIGHT CHANGE: 0
FEVER: 0
RECTAL PAIN: 0

## 2023-12-18 NOTE — PROGRESS NOTES
"History Of Present Illness  Janine Hendrix \"Lynette" is a 78 y.o. female presenting with a chief complaint of Nausea and Vomiting (Pt in to follow up with N/V and MRI. She states she is not in any pain, other than her back. No Rx changes. ). She is here with her daughter.  Since last appointment patient has been feeling better, denies ever having abdominal pain.  She does have some back pain which has been constant since her MRI.  Back pain does not increase after meals.  Patient has not had any vomiting for multiple weeks now.  She thinks that she had a large amount of phlegm which was making her nauseous and vomit.    Social History  She reports that she has quit smoking. Her smoking use included cigarettes. She has a 2.50 pack-year smoking history. She has never used smokeless tobacco. She reports current alcohol use. She reports that she does not use drugs.  she does not take NSAIDs on a regular basis    Family History  Family History   Problem Relation Name Age of Onset    Breast cancer Mother      No Known Problems Father      No Known Problems Other       The patient does not have a FH of CRC. she does not have a FH of IBD    Review of Systems   Constitutional:  Negative for appetite change, chills, diaphoresis, fatigue, fever and unexpected weight change.   HENT:  Negative for trouble swallowing.    Gastrointestinal:  Positive for nausea and vomiting. Negative for abdominal distention, abdominal pain, anal bleeding, blood in stool, constipation, diarrhea and rectal pain.        See HPI   Musculoskeletal:  Positive for back pain.   All other systems reviewed and are negative.        Physical Exam  Constitutional:       Appearance: Normal appearance.   HENT:      Head: Normocephalic and atraumatic.      Mouth/Throat:      Mouth: Mucous membranes are moist.   Eyes:      Extraocular Movements: Extraocular movements intact.      Conjunctiva/sclera: Conjunctivae normal.      Pupils: Pupils are equal, round, and " "reactive to light.   Pulmonary:      Effort: Pulmonary effort is normal.   Abdominal:      General: Bowel sounds are normal. There is no distension.      Palpations: Abdomen is soft. There is no mass.      Tenderness: There is abdominal tenderness in the epigastric area. There is no guarding or rebound.      Hernia: No hernia is present.   Musculoskeletal:         General: Normal range of motion.      Cervical back: Normal range of motion.      Right lower leg: No edema.      Left lower leg: No edema.   Skin:     General: Skin is warm and dry.      Coloration: Skin is not jaundiced or pale.   Neurological:      General: No focal deficit present.      Mental Status: She is alert and oriented to person, place, and time. Mental status is at baseline.   Psychiatric:         Mood and Affect: Mood normal.         Behavior: Behavior normal.         Thought Content: Thought content normal.         Judgment: Judgment normal.          Last Vital Signs  /80 (BP Location: Right arm, Patient Position: Sitting, BP Cuff Size: Large adult)   Pulse 72   Ht 1.6 m (5' 3\")   Wt 67.1 kg (148 lb)   SpO2 97%   BMI 26.22 kg/m²      Relevant Results  Lab Results   Component Value Date    WBC 6.5 11/04/2023    HGB 13.3 11/04/2023    HCT 40.2 11/04/2023    MCV 92 11/04/2023     11/04/2023      Lab Results   Component Value Date    GLUCOSE 101 (H) 11/04/2023    CALCIUM 9.4 11/04/2023     11/04/2023    K 4.5 11/04/2023    CO2 26 11/04/2023     11/04/2023    BUN 25 (H) 11/04/2023    CREATININE 0.83 11/04/2023      Lab Results   Component Value Date    ALT 13 11/04/2023    AST 16 11/04/2023    ALKPHOS 60 11/04/2023    BILITOT 0.5 11/04/2023    BILIDIR 0.2 11/29/2021    INR 1.1 05/06/2022      Lab Results   Component Value Date    IRON 105 09/26/2022    TIBC 320 09/26/2022    IRONSAT 33 09/26/2022    FERRITIN 209 (H) 09/26/2022    [id number] 03/20/2023    FOLATE 21.1 03/20/2023       Lab Results   Component Value " Date    CRP 0.5 11/28/2021     CT ABDOMEN W IV CONTRAST 12/1/23  - Impression -  1. Dilated main pancreatic duct up to 6 mm in the pancreatic head  without CT evident obstructing mass. Consider further evaluation with  MRCP.  2. No cholelithiasis or acute cholecystitis. No bile duct dilation.  No bowel obstruction.  3. Increased size infrarenal AAA, 4.1 cm, previously 3.7 cm.  4. Right middle lobe patchy ground-glass opacity, likely  infectious/inflammatory. Consider follow-up in 3-6 months to  resolution.    MRCP pancreas with and without contrast 12/11/23; IMPRESSION:  1.  No acute findings in the abdomen.  2. Dilated pancreatic duct up to 7 mm. No visualized obstructing  lesion or mass.  3. 4.1 cm infrarenal aortic aneurysm.  A1C 6.8  Esophageal manometry 9/15/2020-   Impressions       Overall the patient has a normal motiity with a hiatal hernia. The LES        relaxes and esophageal body is normal. The IRP is elevated but this is        likely due to hiata       IMPEDANCE:       - There was complete bolus clearance in 100 % of swallows (normal > 70        percent).  EGD 9/15/2020 with Dr. Ordonez-  LA grade a reflux esophagitis, biopsies noted acute and chronic inflammation, no dysplasia. small hiatal hernia.  Chronic gastritis, normal duodenum.  EGD 8/7/2019 with Dr. Kamara-  Findings:  ESOPHAGUS: The esophageal mucosa appeared normal including the Z line at 35 cm. There was evidence of erosive gastritis with a prepyloric clean-based ulcer identified.  The ulcer was shallow measuring around 6 mm in diameter.  The ulcer at the typical appearance of a peptic ulcer related to NSAIDs. Biopsies negative for h. Pylori. The duodenum appeared normal to D3  Repeat EGD 10/10/2019 with Dr. Kamara- single gastic erosion, no evidence of previously noted gastric ulcer.  Gastric biopsy with minimal inflammation, no H. pylori  Last colonoscopy 11/13/15 with Dr. Kamara-due in 2025    Assessment/Plan   78 y.o. female presenting  to GI clinic for follow-up.  Had continued vomiting initially after last visit and PCP ordered RUQ ultrasound which was overall unremarkable.  CT scan showed pancreatic duct dilatation, so patient completed MRCP which showed 7 mm pancreatic duct dilatation, no lesions or masses noted.  Nausea and vomiting has subsided, denies abdominal pain. Has back pain which has been present since MRI, does not change with PO intake.    Discussed with Dr. Ramirez, EUS could be beneficial but is dependent on pt's symptoms. At this time, would not recommend EUS since N/V is resolved. EGD is not urgent at this time, had been ordered at last appointment.   Per patient, Dr. Escalante wants her to be on Plavix x1 year without holding due to stent placement in April 2023. Will check with my MA who contacted his office at last appointment for clearance.  Would recommend EGD sooner if symptoms return or worsen   Continue Pantoprazole daily  Will screen for EPI given history N/V    Problem List Items Addressed This Visit    None  Visit Diagnoses       Nausea and vomiting, unspecified vomiting type    -  Primary    Relevant Orders    Pancreatic Elastase, Fecal    Dilation of pancreatic duct                Zoe Rico, APRN-CNP

## 2023-12-18 NOTE — PATIENT INSTRUCTIONS
Your MRI showed dilation of the pancreatic duct but there were no masses present  EGD- would recommend this sooner than April if symptoms return  Stool test to look at pancreas function  Call if symptoms persist or worsen

## 2023-12-28 ENCOUNTER — TELEMEDICINE (OUTPATIENT)
Dept: PRIMARY CARE | Facility: CLINIC | Age: 78
End: 2023-12-28
Payer: MEDICARE

## 2023-12-28 ENCOUNTER — TELEPHONE (OUTPATIENT)
Dept: PRIMARY CARE | Facility: CLINIC | Age: 78
End: 2023-12-28
Payer: MEDICARE

## 2023-12-28 DIAGNOSIS — R06.2 WHEEZING: ICD-10-CM

## 2023-12-28 DIAGNOSIS — J20.9 ACUTE BRONCHITIS, UNSPECIFIED ORGANISM: ICD-10-CM

## 2023-12-28 DIAGNOSIS — U07.1 COVID: Primary | ICD-10-CM

## 2023-12-28 PROCEDURE — 99442 PR PHYS/QHP TELEPHONE EVALUATION 11-20 MIN: CPT | Performed by: FAMILY MEDICINE

## 2023-12-28 RX ORDER — PREDNISONE 20 MG/1
40 TABLET ORAL DAILY
Qty: 10 TABLET | Refills: 0 | Status: SHIPPED | OUTPATIENT
Start: 2023-12-28 | End: 2024-01-02

## 2023-12-28 RX ORDER — AZITHROMYCIN 250 MG/1
TABLET, FILM COATED ORAL
Qty: 6 TABLET | Refills: 0 | Status: SHIPPED | OUTPATIENT
Start: 2023-12-28 | End: 2024-02-21 | Stop reason: WASHOUT

## 2023-12-28 NOTE — PROGRESS NOTES
"Subjective   Patient ID: Janine Hendrix \"Lynette" is a 78 y.o. female who presents for covid positive    HPI    Telephone Visit     A telephone visit (audio only) between the patient (at the originating site) and the provider (at the distant site) was utilized to provide this telehealth service.   Verbal consent was requested and obtained from Janine Hendrix on this date, 12/28/23 for a telehealth visit.     Spent 11 minutes on medical discussion.   Patient tested positive for covid, symptoms started yesterday. Has been having cough, sob and wheezing. Also having low grade fever. No nausea/vomiting diarrhea. Does not want to start paxlovid at this time.   Sating 91% on RA    Review of Systems  As per HPI    Vitals: 91% RA     Objective   Physical Exam  physical exam could not be performed due to telephone visit      Assessment/Plan   Problem List Items Addressed This Visit    None  Visit Diagnoses       COVID    -  Primary    Wheezing        Relevant Medications    predniSONE (Deltasone) 20 mg tablet    Acute bronchitis, unspecified organism        Relevant Medications    azithromycin (Zithromax) 250 mg tablet          Advised patient and daughter that if patient's sats go below 90s to go immediately to the ED.        "

## 2024-01-15 DIAGNOSIS — K21.9 GASTROESOPHAGEAL REFLUX DISEASE, UNSPECIFIED WHETHER ESOPHAGITIS PRESENT: ICD-10-CM

## 2024-01-15 RX ORDER — FAMOTIDINE 20 MG/1
20 TABLET, FILM COATED ORAL 2 TIMES DAILY
Qty: 180 TABLET | Refills: 3 | Status: SHIPPED | OUTPATIENT
Start: 2024-01-15 | End: 2024-02-27 | Stop reason: SDUPTHER

## 2024-01-19 ENCOUNTER — TELEMEDICINE (OUTPATIENT)
Dept: PRIMARY CARE | Facility: CLINIC | Age: 79
End: 2024-01-19
Payer: MEDICARE

## 2024-01-19 DIAGNOSIS — N39.0 ACUTE UTI: Primary | ICD-10-CM

## 2024-01-19 PROCEDURE — 99441 PR PHYS/QHP TELEPHONE EVALUATION 5-10 MIN: CPT | Performed by: FAMILY MEDICINE

## 2024-01-19 RX ORDER — NITROFURANTOIN 25; 75 MG/1; MG/1
100 CAPSULE ORAL 2 TIMES DAILY
Qty: 14 CAPSULE | Refills: 0 | Status: SHIPPED | OUTPATIENT
Start: 2024-01-19 | End: 2024-01-26

## 2024-01-19 NOTE — PROGRESS NOTES
"Subjective   Patient ID: Janine Hendrix \"Lynette" is a 78 y.o. female who presents for urinary urgency    HPI  Telephone visit    A telephone visit (audio only) between the patient (at the originating site) and the provider (at the distant site) was utilized to provide this telehealth service.   Verbal consent was requested and obtained from Janine Hendrix on this date, 01/19/24 for a telehealth visit.     Spent 7 minutes on medical discussion with patient    Has been having increased urinary frequency x few weeks, with decreased urine output, has nausea as well. Has urine pressure. No fever.      Review of Systems  As per HPI    Vitals:  due to telehealth visit, vitals not obtained, patient did not have them available at the time of the visit       Objective   Physical Exam  physical exam could not be performed due to telephone visit    Assessment/Plan   Problem List Items Addressed This Visit    None  Visit Diagnoses       Acute UTI    -  Primary    Relevant Medications    nitrofurantoin, macrocrystal-monohydrate, (Macrobid) 100 mg capsule    Other Relevant Orders    Urine Culture    Urinalysis with Reflex Microscopic               "

## 2024-01-22 ENCOUNTER — TELEPHONE (OUTPATIENT)
Dept: GASTROENTEROLOGY | Facility: CLINIC | Age: 79
End: 2024-01-22
Payer: MEDICARE

## 2024-01-22 NOTE — TELEPHONE ENCOUNTER
Patients daughter Iman called and stated her mom has been vomiting for the last 5 days, she has not been able to eat anything. The daughter said she gave her one of the nausea medication last night and was able to get her to eat a little bit is asking if she should make an appointment or just schedule for a scope?

## 2024-01-22 NOTE — TELEPHONE ENCOUNTER
Okay, have her continue supportive care, hydration. EGD order is in system, so she can schedule it with James. She will need clearance from Boris to go off her blood thinner.

## 2024-01-24 ENCOUNTER — APPOINTMENT (OUTPATIENT)
Dept: PRIMARY CARE | Facility: CLINIC | Age: 79
End: 2024-01-24
Payer: MEDICARE

## 2024-02-07 ENCOUNTER — APPOINTMENT (OUTPATIENT)
Dept: PRIMARY CARE | Facility: CLINIC | Age: 79
End: 2024-02-07
Payer: MEDICARE

## 2024-02-09 ENCOUNTER — APPOINTMENT (OUTPATIENT)
Dept: PRIMARY CARE | Facility: CLINIC | Age: 79
End: 2024-02-09
Payer: MEDICARE

## 2024-02-21 ENCOUNTER — TELEPHONE (OUTPATIENT)
Dept: PRIMARY CARE | Facility: CLINIC | Age: 79
End: 2024-02-21

## 2024-02-21 ENCOUNTER — OFFICE VISIT (OUTPATIENT)
Dept: PRIMARY CARE | Facility: CLINIC | Age: 79
End: 2024-02-21
Payer: MEDICARE

## 2024-02-21 VITALS
OXYGEN SATURATION: 98 % | DIASTOLIC BLOOD PRESSURE: 60 MMHG | WEIGHT: 120.4 LBS | TEMPERATURE: 98 F | HEIGHT: 63 IN | BODY MASS INDEX: 21.33 KG/M2 | HEART RATE: 74 BPM | SYSTOLIC BLOOD PRESSURE: 110 MMHG

## 2024-02-21 DIAGNOSIS — R31.9 URINARY TRACT INFECTION WITH HEMATURIA, SITE UNSPECIFIED: ICD-10-CM

## 2024-02-21 DIAGNOSIS — R13.10 DYSPHAGIA, UNSPECIFIED TYPE: ICD-10-CM

## 2024-02-21 DIAGNOSIS — N39.0 URINARY TRACT INFECTION WITH HEMATURIA, SITE UNSPECIFIED: ICD-10-CM

## 2024-02-21 DIAGNOSIS — E55.9 VITAMIN D INSUFFICIENCY: ICD-10-CM

## 2024-02-21 DIAGNOSIS — K21.9 GASTROESOPHAGEAL REFLUX DISEASE WITHOUT ESOPHAGITIS: ICD-10-CM

## 2024-02-21 DIAGNOSIS — R41.3 MEMORY LOSS OR IMPAIRMENT: ICD-10-CM

## 2024-02-21 DIAGNOSIS — E78.5 BORDERLINE HYPERLIPIDEMIA: ICD-10-CM

## 2024-02-21 DIAGNOSIS — R91.1 PULMONARY NODULE: ICD-10-CM

## 2024-02-21 DIAGNOSIS — Z00.00 ROUTINE GENERAL MEDICAL EXAMINATION AT HEALTH CARE FACILITY: ICD-10-CM

## 2024-02-21 DIAGNOSIS — I25.10 CORONARY ARTERY DISEASE INVOLVING NATIVE CORONARY ARTERY OF NATIVE HEART WITHOUT ANGINA PECTORIS: ICD-10-CM

## 2024-02-21 DIAGNOSIS — Z78.0 ASYMPTOMATIC POSTMENOPAUSAL STATE: ICD-10-CM

## 2024-02-21 DIAGNOSIS — F41.8 ANXIETY WITH DEPRESSION: ICD-10-CM

## 2024-02-21 DIAGNOSIS — E11.9 TYPE 2 DIABETES MELLITUS WITHOUT COMPLICATION, UNSPECIFIED WHETHER LONG TERM INSULIN USE (MULTI): ICD-10-CM

## 2024-02-21 DIAGNOSIS — E11.9 TYPE 2 DIABETES MELLITUS WITHOUT COMPLICATION, WITHOUT LONG-TERM CURRENT USE OF INSULIN (MULTI): ICD-10-CM

## 2024-02-21 DIAGNOSIS — Z00.00 MEDICARE ANNUAL WELLNESS VISIT, SUBSEQUENT: Primary | ICD-10-CM

## 2024-02-21 LAB
POC ALBUMIN /CREATININE RATIO MANUALLY ENTERED: ABNORMAL UG/MG CREAT
POC APPEARANCE, URINE: CLEAR
POC BILIRUBIN, URINE: NEGATIVE
POC BLOOD, URINE: ABNORMAL
POC COLOR, URINE: YELLOW
POC GLUCOSE, URINE: NEGATIVE MG/DL
POC KETONES, URINE: NEGATIVE MG/DL
POC LEUKOCYTES, URINE: ABNORMAL
POC NITRITE,URINE: NEGATIVE
POC PH, URINE: 6 PH
POC PROTEIN, URINE: NEGATIVE MG/DL
POC SPECIFIC GRAVITY, URINE: 1.02
POC URINE ALBUMIN: 30 MG/L
POC URINE CREATININE: 100 MG/DL
POC UROBILINOGEN, URINE: 0.2 EU/DL

## 2024-02-21 PROCEDURE — 1170F FXNL STATUS ASSESSED: CPT | Performed by: PHYSICIAN ASSISTANT

## 2024-02-21 PROCEDURE — 3074F SYST BP LT 130 MM HG: CPT | Performed by: PHYSICIAN ASSISTANT

## 2024-02-21 PROCEDURE — G0439 PPPS, SUBSEQ VISIT: HCPCS | Performed by: PHYSICIAN ASSISTANT

## 2024-02-21 PROCEDURE — 1159F MED LIST DOCD IN RCRD: CPT | Performed by: PHYSICIAN ASSISTANT

## 2024-02-21 PROCEDURE — 87086 URINE CULTURE/COLONY COUNT: CPT

## 2024-02-21 PROCEDURE — 1036F TOBACCO NON-USER: CPT | Performed by: PHYSICIAN ASSISTANT

## 2024-02-21 PROCEDURE — 1126F AMNT PAIN NOTED NONE PRSNT: CPT | Performed by: PHYSICIAN ASSISTANT

## 2024-02-21 PROCEDURE — 3078F DIAST BP <80 MM HG: CPT | Performed by: PHYSICIAN ASSISTANT

## 2024-02-21 PROCEDURE — 82044 UR ALBUMIN SEMIQUANTITATIVE: CPT | Performed by: PHYSICIAN ASSISTANT

## 2024-02-21 PROCEDURE — 81003 URINALYSIS AUTO W/O SCOPE: CPT | Performed by: PHYSICIAN ASSISTANT

## 2024-02-21 RX ORDER — BUPROPION HYDROCHLORIDE 150 MG/1
150 TABLET ORAL EVERY MORNING
Qty: 90 TABLET | Refills: 1 | Status: SHIPPED | OUTPATIENT
Start: 2024-02-21 | End: 2024-08-19

## 2024-02-21 RX ORDER — METFORMIN HYDROCHLORIDE 500 MG/1
500 TABLET, EXTENDED RELEASE ORAL
Qty: 180 TABLET | Refills: 1 | Status: SHIPPED | OUTPATIENT
Start: 2024-02-21 | End: 2024-08-19

## 2024-02-21 RX ORDER — NITROFURANTOIN 25; 75 MG/1; MG/1
100 CAPSULE ORAL 2 TIMES DAILY
Qty: 14 CAPSULE | Refills: 0 | Status: SHIPPED | OUTPATIENT
Start: 2024-02-21 | End: 2024-05-24 | Stop reason: WASHOUT

## 2024-02-21 ASSESSMENT — ACTIVITIES OF DAILY LIVING (ADL)
GROCERY_SHOPPING: INDEPENDENT
BATHING: INDEPENDENT
DRESSING: INDEPENDENT
DOING_HOUSEWORK: INDEPENDENT
TAKING_MEDICATION: INDEPENDENT
MANAGING_FINANCES: INDEPENDENT

## 2024-02-21 ASSESSMENT — PATIENT HEALTH QUESTIONNAIRE - PHQ9
SUM OF ALL RESPONSES TO PHQ9 QUESTIONS 1 AND 2: 0
2. FEELING DOWN, DEPRESSED OR HOPELESS: NOT AT ALL
1. LITTLE INTEREST OR PLEASURE IN DOING THINGS: NOT AT ALL

## 2024-02-21 NOTE — PROGRESS NOTES
Subjective   HPI   Janine Hendrix is a 78 y.o. year old female patient with presenting to clinic with concern for Medicare Visit     Chief Complaint   Patient presents with    Medicare Annual Wellness Visit Subsequent    UTI    Medication Problem     Discuss Wellbutrin- emotional.         Neeta presents to clinic for medicare wellness visit    Emotional, crying at minimal things on higher dose of wellbutrin.    Hx Dx zenker's diverticulum found on past EGD. Hx oropharyngeal dysphagia. . Issues with recurrent vomiting. Seeing GI Margot Rico, Had discussed another scope. Need barium swallow?    Concern for memory. Planning to see Neuro Dr. Everett Nelson.    Had COVID after [place]    Hx UTIs      Patient Care Team:  Jessica Burnette PA-C as PCP - General (Family Medicine)  Jessica Burnette PA-C as PCP - [facility] Attributed Provider     Specialists      Screenings  Dentist-  Ophtho-    Body mass index is 21.33 kg/m². BMI (annual wellness)- morbid>40, nutritionist <19     Preventative Health   Health Maintenance Due   Topic Date Due    Eye Exam  11/18/2020    Hemoglobin A1C  12/27/2023    Foot Exam  03/13/2024    Lipid (cholesterol) test  03/20/2024        There are no preventive care reminders to display for this patient.     Immunizations Reviewed  Immunization History   Administered Date(s) Administered    Flu vaccine, quadrivalent, high-dose, preservative free, age 65y+ (FLUZONE) 09/27/2023    Influenza, High Dose Seasonal, Preservative Free 11/15/2018    Influenza, Unspecified 10/24/2013    Influenza, seasonal, injectable 10/10/2022    Moderna SARS-CoV-2 Vaccination 02/20/2021, 03/20/2021, 04/08/2022    Pneumococcal conjugate vaccine, 13-valent (PREVNAR 13) 09/16/2019    Pneumococcal polysaccharide vaccine, 23-valent, age 2 years and older (PNEUMOVAX 23) 09/29/2017    Tdap vaccine, age 7 year and older (BOOSTRIX, ADACEL) 10/28/2016        Problem List Reviewed  Patient Active Problem List   Diagnosis     Abnormal skin growth    Anemia    Anxiety with depression    Aspiration pneumonia (CMS/[facility])    Bilateral carotid artery stenosis    BPPV (benign paroxysmal positional vertigo)    Changing skin lesion    Chest pain    Chronic fatigue and malaise    Chronic low back pain    Chronic right hip pain    Coronary artery disease involving native coronary artery of native heart without angina pectoris    Dementia without behavioral disturbance (CMS/[facility])    DM2 (diabetes mellitus, type 2) (CMS/[facility])    Dyslipidemia    Dysphagia, cricopharyngeal    Elevated TSH    Essential hypertension    GERD (gastroesophageal reflux disease)    Hip pain, right    History of heart artery stent    History of total mastectomy of left breast    Hypokalemia    Hypomagnesemia    Injury of right common femoral artery    Intraoperative complication involving circulatory system associated with cardiac catheterization    Left hip pain    Low back pain    Neck pain    Numbness    Oropharyngeal aspiration    Oropharyngeal dysphagia    Other fatigue    Overweight    PAD (peripheral artery disease) (CMS/[facility])    Pain of left upper extremity    Pain of right upper extremity    Panic attacks    Pernicious anemia    Right leg claudication (CMS/[facility])    Sensation, choking    SCC (squamous cell carcinoma)    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[facility])    Type 2 diabetes mellitus without complication, without long-term current use of insulin (CMS/[facility])    Vitamin D deficiency    Zenker's diverticulum    Allergic rhinitis    Insomnia       Past Medical History:   Diagnosis Date    DM2 (diabetes mellitus, type 2) (CMS/[facility])     Dyslipidemia     Encounter for other preprocedural examination 10/19/2021    Pre-operative clearance    GERD (gastroesophageal reflux disease)     HTN (hypertension), benign     Pain in left hip 06/18/2020    Left hip pain    Personal history of other diseases of the respiratory system 11/30/2020    History of acute bronchitis      Past  "Surgical History:   Procedure Laterality Date    CT ABDOMEN PELVIS ANGIOGRAM W AND/OR WO IV CONTRAST  10/28/2020    CT ABDOMEN PELVIS ANGIOGRAM W AND/OR WO IV CONTRAST 10/28/2020 [facility]    MASTECTOMY Left 1999    malignant    OTHER SURGICAL HISTORY  11/18/2020    Vascular surgical procedure    OTHER SURGICAL HISTORY  11/18/2020    Cardiac catheterization      Family History   Problem Relation Name Age of Onset    Breast cancer Mother      No Known Problems Father      No Known Problems Other        Social History     Tobacco Use    Smoking status: Former     Packs/day: 0.50     Years: 5.00     Additional pack years: 0.00     Total pack years: 2.50     Types: Cigarettes    Smokeless tobacco: Never   Substance Use Topics    Alcohol use: Yes     Comment: rare        Medications Reviewed  Current Outpatient Medications on File Prior to Visit   Medication Sig Dispense Refill    albuterol 90 mcg/actuation inhaler INHALE 2 INHALATIONS BY MOUTH  EVERY 6 HOURS IF NEEDED FOR  SHORTNESS OF BREATH 17 g 2    amLODIPine (Norvasc) 5 mg tablet TAKE 1 TABLET BY MOUTH DAILY 90 tablet 3    aspirin 81 mg EC tablet Take 1 tablet (81 mg) by mouth once daily.      BD Luer-Akilah Syringe 3 mL 22 x 1 1/2\" syringe 1 every 2 weeks      clopidogrel (Plavix) 75 mg tablet       ergocalciferol (Vitamin D-2) 1.25 MG (95106 UT) capsule Take 1 capsule (1,250 mcg) by mouth 1 (one) time per week. 12 capsule 3    famotidine (Pepcid) 20 mg tablet TAKE 1 TABLET BY MOUTH TWICE  DAILY 180 tablet 3    FreeStyle Test strip Test twice daily as directed 100 strip 1    hydrALAZINE (Apresoline) 50 mg tablet TAKE 1 TABLET BY MOUTH TWICE  DAILY 180 tablet 3    ipratropium-albuteroL (Duo-Neb) 0.5-2.5 mg/3 mL nebulizer solution Take 3 mL by nebulization 4 times a day as needed for wheezing or shortness of breath. 90 mL 3    isosorbide mononitrate ER (Imdur) 30 mg 24 hr tablet Take 1 tablet (30 mg) by mouth once daily.      metoprolol succinate XL " "(Toprol-XL) 100 mg 24 hr tablet TAKE 1 TABLET BY MOUTH DAILY 90 tablet 3    nitroglycerin (Nitrostat) 0.4 mg SL tablet Place 1 tablet (0.4 mg) under the tongue every 5 minutes if needed for chest pain.      pantoprazole (ProtoNix) 40 mg EC tablet Take 1 tablet (40 mg) by mouth 2 times a day for 60 days, THEN 1 tablet (40 mg) once daily in the morning. Take before meals. Do not crush, chew, or split.. 180 tablet 0    syringe, disposable, (Carepoint Luer Slip Syringe) 1 mL syringe       [DISCONTINUED] azithromycin (Zithromax) 250 mg tablet Take 2 tabs (500 mg) by mouth today, than 1 daily for 4 days. 6 tablet 0    [DISCONTINUED] buPROPion XL (Wellbutrin XL) 300 mg 24 hr tablet Take 1 tablet (300 mg) by mouth once daily in the morning. Do not crush, chew, or split. 90 tablet 1    [DISCONTINUED] evolocumab (Repatha SureClick) 140 mg/mL injection Inject 1 mL (140 mg) under the skin every 14 (fourteen) days. 2 each 11    [DISCONTINUED] metFORMIN  mg 24 hr tablet Take 1 tablet (500 mg) by mouth 2 times a day with meals. 180 tablet 1     No current facility-administered medications on file prior to visit.        Review of Systems  Constitutional: Denies fever  HEENT: Denies ST, earache  CVS: Denies Chest pain  Pulmonary: Denies wheezing, SOB  GI: Denies N/V  : Denies dysuria  Musculoskeletal:  Denies myalgia  Neuro: Denies focal weakness or numbness.  Skin: Denies Rashes.  *Review of Systems is negative unless otherwise mentioned in HPI or ROS above.      @OBJECTIVEBEGIN  /60   Pulse 74   Temp 36.7 °C (98 °F)   Ht 1.6 m (5' 3\")   Wt 54.6 kg (120 lb 6.4 oz)   SpO2 98%   BMI 21.33 kg/m²  reviewed Body mass index is 21.33 kg/m².     Physical Exam  Constitutional: NAD. Afebrile. Resting comfortably.  ENT: Nasal mucosa and oropharynx: moist oral mucosa. Posterior oropharynx nonerythematous. No posterior pharyngeal streaking.  Eyes: PERRLA. EOM intact. No vertical or circular nystagmus.  Lymph: No anterior " cervical chain or submandibular lymphadenopathy. No sentinel lymph nodes.  Cardiac: Regular rate & rhythm. No murmur, gallops, or rubs.  Pulmonary: Lungs clear to auscultation bilaterally with good aeration. No wheezes, rhonchi, or rales. Normal WOB.  GI: Soft, Nontender, nondistended. No guarding. Normal BS x4.  : No suprapubic tenderness. No CVA tenderness to percussion.   Musculoskeletal: No peripheral edema.   Skin: No evidence of trauma. No rashes  Neuro: No focal neuro deficits. Normal gait without assistive devices.  Psych: Intact judgement and insight.    MDM        .Assessment/Plan   Problem List Items Addressed This Visit             ICD-10-CM    Anxiety with depression F41.8    Relevant Medications    buPROPion XL (Wellbutrin XL) 150 mg 24 hr tablet    Coronary artery disease involving native coronary artery of native heart without angina pectoris I25.10    Relevant Medications    evolocumab (Repatha SureClick) 140 mg/mL injection (Start on 3/22/2024)    evolocumab (Repatha SureClick) 140 mg/mL injection    DM2 (diabetes mellitus, type 2) (CMS/[facility]) E11.9    Relevant Medications    metFORMIN  mg 24 hr tablet    Other Relevant Orders    POCT Albumin random urine manually resulted (Completed)    GERD (gastroesophageal reflux disease) K21.9    Relevant Orders    EGD     Other Visit Diagnoses         Codes    Medicare annual wellness visit, subsequent    -  Primary Z00.00    Urinary tract infection with hematuria, site unspecified     N39.0, R31.9    Relevant Medications    nitrofurantoin, macrocrystal-monohydrate, (Macrobid) 100 mg capsule    Other Relevant Orders    POCT UA Automated manually resulted (Completed)    Urine Culture    Pulmonary nodule     R91.1    Relevant Orders    CT chest wo IV contrast    Memory loss or impairment     R41.3    Relevant Orders    Referral to Neurology    Dysphagia, unspecified type     R13.10    Relevant Orders    EGD    Asymptomatic postmenopausal state     Z78.0     Relevant Orders    XR DEXA bone density    Borderline hyperlipidemia     E78.5    Relevant Orders    CBC    Comprehensive Metabolic Panel    TSH with reflex to Free T4 if abnormal    Lipid Panel    Vitamin D insufficiency     E55.9    Relevant Orders    Vitamin D 25-Hydroxy,Total (for eval of Vitamin D levels)    Routine general medical examination at health care facility     Z00.00

## 2024-02-21 NOTE — TELEPHONE ENCOUNTER
Pharmacy is questioning the Repatha sure click pen instructions:   Inject 1 mL (140 mg) under the skin every 28 (twenty-eight) days.  Normal dosage is every 14 days, patient was previously on 14 days, can you verify it the 28 days is correct?

## 2024-02-23 LAB — BACTERIA UR CULT: NO GROWTH

## 2024-02-27 DIAGNOSIS — K21.9 GASTROESOPHAGEAL REFLUX DISEASE, UNSPECIFIED WHETHER ESOPHAGITIS PRESENT: ICD-10-CM

## 2024-02-27 RX ORDER — FAMOTIDINE 20 MG/1
20 TABLET, FILM COATED ORAL 2 TIMES DAILY
Qty: 180 TABLET | Refills: 3 | Status: SHIPPED | OUTPATIENT
Start: 2024-02-27

## 2024-03-04 ENCOUNTER — HOSPITAL ENCOUNTER (OUTPATIENT)
Dept: RADIOLOGY | Facility: HOSPITAL | Age: 79
Discharge: HOME | End: 2024-03-04
Payer: MEDICARE

## 2024-03-04 DIAGNOSIS — R91.1 PULMONARY NODULE: ICD-10-CM

## 2024-03-04 PROCEDURE — 71250 CT THORAX DX C-: CPT

## 2024-03-04 PROCEDURE — 71250 CT THORAX DX C-: CPT | Performed by: RADIOLOGY

## 2024-03-08 ENCOUNTER — OFFICE VISIT (OUTPATIENT)
Dept: CARDIOLOGY | Facility: HOSPITAL | Age: 79
End: 2024-03-08
Payer: MEDICARE

## 2024-03-08 ENCOUNTER — HOSPITAL ENCOUNTER (OUTPATIENT)
Dept: VASCULAR MEDICINE | Facility: HOSPITAL | Age: 79
Discharge: HOME | End: 2024-03-08
Payer: MEDICARE

## 2024-03-08 VITALS
WEIGHT: 121.69 LBS | SYSTOLIC BLOOD PRESSURE: 122 MMHG | OXYGEN SATURATION: 95 % | DIASTOLIC BLOOD PRESSURE: 73 MMHG | BODY MASS INDEX: 21.56 KG/M2 | HEART RATE: 77 BPM

## 2024-03-08 DIAGNOSIS — I73.9 RIGHT LEG CLAUDICATION (CMS-HCC): ICD-10-CM

## 2024-03-08 PROCEDURE — 93924 LWR XTR VASC STDY BILAT: CPT | Performed by: SURGERY

## 2024-03-08 PROCEDURE — 99214 OFFICE O/P EST MOD 30 MIN: CPT | Performed by: INTERNAL MEDICINE

## 2024-03-08 PROCEDURE — 1159F MED LIST DOCD IN RCRD: CPT | Performed by: INTERNAL MEDICINE

## 2024-03-08 PROCEDURE — 1126F AMNT PAIN NOTED NONE PRSNT: CPT | Performed by: INTERNAL MEDICINE

## 2024-03-08 PROCEDURE — 93924 LWR XTR VASC STDY BILAT: CPT

## 2024-03-08 PROCEDURE — 3078F DIAST BP <80 MM HG: CPT | Performed by: INTERNAL MEDICINE

## 2024-03-08 PROCEDURE — 1036F TOBACCO NON-USER: CPT | Performed by: INTERNAL MEDICINE

## 2024-03-08 PROCEDURE — 3074F SYST BP LT 130 MM HG: CPT | Performed by: INTERNAL MEDICINE

## 2024-03-08 NOTE — PROGRESS NOTES
Primary Care Physician: Jessica Burnette PA-C   Date of Visit: 03/08/2024  1:00 PM EST  Type of Visit: Follow-up visit    Chief Complaint:  Chief Complaint   Patient presents with    Follow-up     Bilateral leg pain        HPI  Janine Hendrix 78 y.o. female who presents for follow-up.    She is concerned because she has worsening pain in her right leg.  It is not entirely clear; the symptoms do not sound completely exertional.  She denies any rest pain or nonhealing wounds.    Review of Systems   12 point review of systems was obtained in detail and is negative other than that noted above or below.     Past Medical/Surgical History:  Janine has a past medical history of DM2 (diabetes mellitus, type 2) (CMS/[facility]), Dyslipidemia, Encounter for other preprocedural examination (10/19/2021), GERD (gastroesophageal reflux disease), HTN (hypertension), benign, Pain in left hip (06/18/2020), and Personal history of other diseases of the respiratory system (11/30/2020).    Janine has a past surgical history that includes Other surgical history (11/18/2020); Other surgical history (11/18/2020); CT angio abdomen pelvis w and or wo IV IV contrast (10/28/2020); and Mastectomy (Left, 1999).    Social/Family History:  Janine reports that she has quit smoking. Her smoking use included cigarettes. She has a 2.50 pack-year smoking history. She has never used smokeless tobacco. She reports current alcohol use. She reports that she does not use drugs.    Janine's family history includes Breast cancer in her mother; No Known Problems in her father and another family member.    Allergies:  Allergies   Allergen Reactions    Adhesive Tape-Silicones Rash    Bloodstop Bandage Rash       Medications:  Current Outpatient Medications on File Prior to Visit   Medication Sig    albuterol 90 mcg/actuation inhaler INHALE 2 INHALATIONS BY MOUTH  EVERY 6 HOURS IF NEEDED FOR  SHORTNESS OF BREATH    amLODIPine (Norvasc) 5 mg tablet TAKE 1 TABLET BY  "MOUTH DAILY    aspirin 81 mg EC tablet Take 1 tablet (81 mg) by mouth once daily.    BD Luer-Akilah Syringe 3 mL 22 x 1 1/2\" syringe 1 every 2 weeks    buPROPion XL (Wellbutrin XL) 150 mg 24 hr tablet Take 1 tablet (150 mg) by mouth once daily in the morning. Do not crush, chew, or split.    clopidogrel (Plavix) 75 mg tablet     ergocalciferol (Vitamin D-2) 1.25 MG (45887 UT) capsule Take 1 capsule (1,250 mcg) by mouth 1 (one) time per week.    [START ON 3/22/2024] evolocumab (Repatha SureClick) 140 mg/mL injection Inject 1 mL (140 mg) under the skin every 14 (fourteen) days. Do not start before March 22, 2024.    evolocumab (Repatha SureClick) 140 mg/mL injection Inject 1 mL (140 mg) under the skin every 14 (fourteen) days.    famotidine (Pepcid) 20 mg tablet Take 1 tablet (20 mg) by mouth 2 times a day.    FreeStyle Test strip Test twice daily as directed    hydrALAZINE (Apresoline) 50 mg tablet TAKE 1 TABLET BY MOUTH TWICE  DAILY    ipratropium-albuteroL (Duo-Neb) 0.5-2.5 mg/3 mL nebulizer solution Take 3 mL by nebulization 4 times a day as needed for wheezing or shortness of breath.    isosorbide mononitrate ER (Imdur) 30 mg 24 hr tablet Take 1 tablet (30 mg) by mouth once daily.    metFORMIN  mg 24 hr tablet Take 1 tablet (500 mg) by mouth 2 times a day with meals.    metoprolol succinate XL (Toprol-XL) 100 mg 24 hr tablet TAKE 1 TABLET BY MOUTH DAILY    nitroglycerin (Nitrostat) 0.4 mg SL tablet Place 1 tablet (0.4 mg) under the tongue every 5 minutes if needed for chest pain.    pantoprazole (ProtoNix) 40 mg EC tablet Take 1 tablet (40 mg) by mouth 2 times a day for 60 days, THEN 1 tablet (40 mg) once daily in the morning. Take before meals. Do not crush, chew, or split..    syringe, disposable, (Carepoint Luer Slip Syringe) 1 mL syringe      No current facility-administered medications on file prior to visit.       Objective:   Vitals:    03/08/24 1300   BP: 122/73   Pulse: 77   SpO2: 95%       S1-S2 " normal, regular rate and rhythm, no murmurs, rubs, gallops  Clear to auscultation bilaterally    Labs and Imaging:      Latest Ref Rng & Units 5/3/2022     9:11 AM 5/6/2022     5:42 PM 9/26/2022     9:32 AM 12/19/2022     4:20 PM 3/20/2023     9:24 AM 4/21/2023     9:33 AM 11/4/2023     8:06 AM   Electrolytes   Na 136 - 145 mmol/L 139  136  139  141  138  137  144    K 3.5 - 5.3 mmol/L 4.1  4.3  4.2  4.1  4.2  4.5  4.5    Cl 98 - 107 mmol/L 102  101  104  104  100  98  103    CO2 21 - 32 mmol/L 29  28  26  27  27  27  26    Cr 0.50 - 1.05 mg/dL 0.87  0.79  0.77  0.67  0.95  0.74  0.83    Ca 8.6 - 10.3 mg/dL 9.7  9.3  9.0  9.4  9.1  9.4  9.4          Latest Ref Rng & Units 5/3/2022     9:11 AM 5/6/2022     5:42 PM 9/26/2022     9:32 AM 12/19/2022     4:20 PM 3/20/2023     9:24 AM 4/21/2023     9:33 AM 11/4/2023     8:06 AM   CBC   Hb 12.0 - 16.0 g/dL 13.4  12.4  13.8  13.4  13.9  13.8  13.3    Hct 36.0 - 46.0 % 42.1  37.0  42.4  40.1  41.9  42.7  40.2    MCV 80 - 100 fL 91  88  91  89  90  90  92    RDW 11.5 - 14.5 % 13.7  13.7  12.9  13.2  12.7  13.0  13.7          Latest Ref Rng & Units 12/20/2021     9:03 AM 5/3/2022     9:11 AM 5/6/2022     5:42 PM 9/26/2022     9:32 AM 12/19/2022     4:20 PM 3/20/2023     9:24 AM 11/4/2023     8:06 AM   Lipids   Chol 0 - 199 mg/dL 134  123   139   149     HDL mg/dL 52.7  67.0   61.0   68.4     LDL 0 - 99 mg/dL 35  25   40   48     Trig 0 - 149 mg/dL 233  157   192   164     ALT 7 - 45 U/L 12  17  13  16  12  17  13    AST 9 - 39 U/L 15  18  13  19  16  21  16          Latest Ref Rng & Units 11/30/2020    11:23 AM 4/7/2021     9:03 AM 10/25/2021     9:16 AM 12/20/2021     9:03 AM 5/3/2022     9:11 AM 9/26/2022     9:32 AM 3/20/2023     9:24 AM   Thyroid   T4 Free 0.61 - 1.12 ng/dL  1.06         TSH 0.44 - 3.98 mIU/L 1.01  4.66  2.93  2.28  1.82  2.05  2.28           No data to display                 Lab Results   Component Value Date    HGBA1C 6.8 (A) 09/27/2023    HGBA1C 7.6  (A) 03/20/2023    HGBA1C 6.4 (A) 09/26/2022        The ASCVD Risk score (Anum COHN, et al., 2019) failed to calculate for the following reasons:    The patient has a prior MI or stroke diagnosis     Echocardiogram: No results found for this or any previous visit.     Echocardiogram:   ECHOCARDIOGRAM     Narrative  Ordered by an unspecified provider.    Stress Testing: No results found for this or any previous visit from the past 1825 days.    Cardiac Catheterization:   Coronary Angiography:  The coronary circulation is right dominant.    Left Main Coronary Artery:  The left main coronary artery is a short caliber vessel.    Left Anterior Descending Coronary Artery Distribution:  The LAD demonstrated a previous patent stent. The ostial left anterior descending coronary artery showed 30% stenosis.    Circumflex Coronary Artery Distribution:  The circumflex revealed multiple previous patent stents. The ostial circumflex coronary artery showed 95% stenosis.    Right Coronary Artery Distribution:    The proximal right coronary artery showed 30% in-stent restenosis.    Coronary Interventions:  Pre-intervention TOMMIE flow was 3. Post-intervention TOMMIE flow was 3.    Coronary Intervention Comments:  A 6Fr JL3.5 guide was used to cannulate the left main and heparin was used for adjunctive anticoagulaton. A pressure wire was placed in the distal LAD for FFR to ensure no LAD comprimise--IC NTG and IV adenosine were given. After 2 minutes of adenosine at 140mcg/kg/min, FFR was 0.91. The pressure wire was removed and Terumo runthrough wires were placed in the LAD and LCx. The ostial LCx lesion was predilated with a 3.0mm compliant balloon and a 3.0 x 15mm [place]ilio [place] NERIS was delivered and deployed to the left main ostium (very, short LM) at 12atm and post-dilated with a 3.0mm NC balloon at 24atm. Final angiographic result shows TOMMIE 3 Flow and 0% residual stenosis.    Coronary Lesion Summary:  Vessel            Stenosis          Vessel Segment  LAD             30% stenosis           ostial  Circumflex      95% stenosis           ostial  RCA        30% in-stent restenosis    proximal    Recommendations:  Aspirin 81mg daily indefinitely and P2Y12 inhibition for at least one year,  preferably extended with stent burden.  Optimize CAD medical therapy.  Consider cardiac rehab.    ____________________________________________________________________________________  CONCLUSIONS:  1. Left main: short/near separate LAD/LCx ostium.  2. LAD: 30% ostial/angulated disease, patent mid-vessel stent.  3. LCx: 95% ostial stenosis, patent proximal-mid vessel stent complex.  4. RCA: 30% proximal ISR.  5. LVEDP 19mmHg, no aortic stenosis on LV-Ao gradient.  6. FFR of LAD = 0.91.  7. Successful PCI to severe ostial LCx stenosis (into LM ostium) with one [place] NERIS.    ____________________________________________________________________________________    Cardiac Scoring: No results found for this or any previous visit from the past 1825 days.    AAA : No results found for this or any previous visit from the past 1825 days.    OTHER: No results found for this or any previous visit from the past 1825 days.        Assessment/Plan:   1. Right leg claudication (CMS/HCC)  Vascular US PVR with exercise           Janine Hendrix 78 y.o. female who presents for follow-up:    1.  Prior right CFA hemorrhage status post covered stent (right CFA: 7 mm x 37 mm Bard Lifestream covered stent, right external iliac artery 7 mm x 40 mm Bard life stent)  2.  Peripheral arterial disease    Symptoms do not sound exertional in nature.  Will check PVR with exercise.    Continue aspirin 81 mg daily and clopidogrel 75 mg daily.    No significant disease noted on PVR with exercise.  Return to clinic in 1 year with MARVIN TBI and arterial duplex.    Time Spent: I spent 30 minutes reviewing medical testing, obtaining medical history and counselling and educating on diagnosis and  documenting clinical encounter.         ____________________________________________________________  Quique Shaun, MD

## 2024-03-13 ENCOUNTER — LAB (OUTPATIENT)
Dept: LAB | Facility: LAB | Age: 79
End: 2024-03-13
Payer: MEDICARE

## 2024-03-13 DIAGNOSIS — E55.9 VITAMIN D INSUFFICIENCY: ICD-10-CM

## 2024-03-13 DIAGNOSIS — E78.5 BORDERLINE HYPERLIPIDEMIA: ICD-10-CM

## 2024-03-13 LAB
25(OH)D3 SERPL-MCNC: 103 NG/ML (ref 30–100)
ALBUMIN SERPL BCP-MCNC: 3.9 G/DL (ref 3.4–5)
ALP SERPL-CCNC: 58 U/L (ref 33–136)
ALT SERPL W P-5'-P-CCNC: 13 U/L (ref 7–45)
ANION GAP SERPL CALC-SCNC: 16 MMOL/L (ref 10–20)
AST SERPL W P-5'-P-CCNC: 16 U/L (ref 9–39)
BILIRUB SERPL-MCNC: 0.4 MG/DL (ref 0–1.2)
BUN SERPL-MCNC: 21 MG/DL (ref 6–23)
CALCIUM SERPL-MCNC: 9.3 MG/DL (ref 8.6–10.3)
CHLORIDE SERPL-SCNC: 102 MMOL/L (ref 98–107)
CHOLEST SERPL-MCNC: 141 MG/DL (ref 0–199)
CHOLESTEROL/HDL RATIO: 2.2
CO2 SERPL-SCNC: 28 MMOL/L (ref 21–32)
CREAT SERPL-MCNC: 0.9 MG/DL (ref 0.5–1.05)
EGFRCR SERPLBLD CKD-EPI 2021: 66 ML/MIN/1.73M*2
ERYTHROCYTE [DISTWIDTH] IN BLOOD BY AUTOMATED COUNT: 13.2 % (ref 11.5–14.5)
GLUCOSE SERPL-MCNC: 129 MG/DL (ref 74–99)
HCT VFR BLD AUTO: 39.6 % (ref 36–46)
HDLC SERPL-MCNC: 63.7 MG/DL
HGB BLD-MCNC: 13 G/DL (ref 12–16)
LDLC SERPL CALC-MCNC: 53 MG/DL
MCH RBC QN AUTO: 29.9 PG (ref 26–34)
MCHC RBC AUTO-ENTMCNC: 32.8 G/DL (ref 32–36)
MCV RBC AUTO: 91 FL (ref 80–100)
NON HDL CHOLESTEROL: 77 MG/DL (ref 0–149)
NRBC BLD-RTO: 0 /100 WBCS (ref 0–0)
PLATELET # BLD AUTO: 218 X10*3/UL (ref 150–450)
POTASSIUM SERPL-SCNC: 4.6 MMOL/L (ref 3.5–5.3)
PROT SERPL-MCNC: 6.5 G/DL (ref 6.4–8.2)
RBC # BLD AUTO: 4.35 X10*6/UL (ref 4–5.2)
SODIUM SERPL-SCNC: 141 MMOL/L (ref 136–145)
T4 FREE SERPL-MCNC: 0.96 NG/DL (ref 0.61–1.12)
TRIGL SERPL-MCNC: 122 MG/DL (ref 0–149)
TSH SERPL-ACNC: 4.03 MIU/L (ref 0.44–3.98)
VLDL: 24 MG/DL (ref 0–40)
WBC # BLD AUTO: 6.5 X10*3/UL (ref 4.4–11.3)

## 2024-03-13 PROCEDURE — 36415 COLL VENOUS BLD VENIPUNCTURE: CPT

## 2024-03-13 PROCEDURE — 82306 VITAMIN D 25 HYDROXY: CPT

## 2024-03-14 ENCOUNTER — ANESTHESIA EVENT (OUTPATIENT)
Dept: ANESTHESIOLOGY | Facility: HOSPITAL | Age: 79
End: 2024-03-14

## 2024-03-14 ENCOUNTER — PRE-ADMISSION TESTING (OUTPATIENT)
Dept: PREADMISSION TESTING | Facility: HOSPITAL | Age: 79
End: 2024-03-14
Payer: MEDICARE

## 2024-03-14 SDOH — HEALTH STABILITY: MENTAL HEALTH: CURRENT SMOKER: 0

## 2024-03-14 ASSESSMENT — DUKE ACTIVITY SCORE INDEX (DASI)
CAN YOU PARTICIPATE IN STRENOUS SPORTS LIKE SWIMMING, SINGLES TENNIS, FOOTBALL, BASKETBALL, OR SKIING: NO
DASI METS SCORE: 4.3
CAN YOU WALK INDOORS, SUCH AS AROUND YOUR HOUSE: YES
CAN YOU DO LIGHT WORK AROUND THE HOUSE LIKE DUSTING OR WASHING DISHES: YES
CAN YOU CLIMB A FLIGHT OF STAIRS OR WALK UP A HILL: NO
CAN YOU PARTICIPATE IN MODERATE RECREATIONAL ACTIVITIES LIKE GOLF, BOWLING, DANCING, DOUBLES TENNIS OR THROWING A BASEBALL OR FOOTBALL: NO
CAN YOU DO YARD WORK LIKE RAKING LEAVES, WEEDING OR PUSHING A MOWER: NO
CAN YOU RUN A SHORT DISTANCE: NO
CAN YOU TAKE CARE OF YOURSELF (EAT, DRESS, BATHE, OR USE TOILET): YES
CAN YOU DO HEAVY WORK AROUND THE HOUSE LIKE SCRUBBING FLOORS OR LIFTING AND MOVING HEAVY FURNITURE: NO
TOTAL_SCORE: 12.45
CAN YOU HAVE SEXUAL RELATIONS: YES
CAN YOU WALK A BLOCK OR TWO ON LEVEL GROUND: NO
CAN YOU DO MODERATE WORK AROUND THE HOUSE LIKE VACUUMING, SWEEPING FLOORS OR CARRYING GROCERIES: NO

## 2024-03-14 NOTE — ANESTHESIA PREPROCEDURE EVALUATION
"Patient: Janine Hendrix \"Neeta\"    Procedure Information    Date: 03/14/24  Reason: PAT         Relevant Problems   Anesthesia (within normal limits)      Cardiovascular   (+) Bilateral carotid artery stenosis   (+) Chest pain   (+) Coronary artery disease involving native coronary artery of native heart without angina pectoris   (+) Essential hypertension   (+) PAD (peripheral artery disease) (CMS/[facility])   (+) 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[facility])      Endocrine   (+) DM2 (diabetes mellitus, type 2) (CMS/[facility])   (+) Type 2 diabetes mellitus without complication, without long-term current use of insulin (CMS/[facility])      GI   (+) GERD (gastroesophageal reflux disease)      /Renal (within normal limits)      Neuro/Psych   (+) Anxiety with depression   (+) Bilateral carotid artery stenosis   (+) Dementia without behavioral disturbance (CMS/[facility])   (+) Panic attacks      Pulmonary   (+) Aspiration pneumonia (CMS/[facility])      GI/Hepatic (within normal limits)      Hematology   (+) Pernicious anemia      Musculoskeletal   (+) Chronic low back pain      Eyes, Ears, Nose, and Throat (within normal limits)      Infectious Disease (within normal limits)      Circulatory   (+) History of heart artery stent      ENT   (+) Allergic rhinitis   (+) BPPV (benign paroxysmal positional vertigo)      Cardiac and Vasculature   (+) Dyslipidemia      Gastrointestinal and Abdominal   (+) Dysphagia, cricopharyngeal   (+) Zenker's diverticulum      Musculoskeletal and Injuries   (+) Low back pain       Clinical information reviewed:                 Chart reviewed.  Cardiac clearance requested.       5/12/23 Carotid US-Right Carotid: Findings are consistent with less than 50% stenosis of the right proximal ICA. Right external carotid artery appears patent with no evidence of stenosis. Left Carotid: Findings are consistent with less than 50% stenosis of the left proximal ICA. Patent left CCA/ICA patch measuring 0.9 cm. Left external carotid " artery appears patent with no evidence of stenosis.  Compared with study from 4/22/2022, Right internal carotid stenosis has changed based on 2022 updated IAC interpretation criteria.         Heart cath 4/24/2023  1. Left main: short/near separate LAD/LCx ostium.  2. LAD: 30% ostial/angulated disease, patent mid-vessel stent.  3. LCx: 95% ostial stenosis, patent proximal-mid vessel stent complex.  4. RCA: 30% proximal ISR.  5. LVEDP 19mmHg, no aortic stenosis on LV-Ao gradient.  6. FFR of LAD = 0.91.  7. Successful PCI to severe ostial LCx stenosis (into LM ostium) with one Abilio Dover NERIS.        Echo 12/2021:  1. The left ventricular systolic function is normal with a 50-55% estimated ejection fraction.  2. Spectral Doppler shows an impaired relaxation pattern of left ventricular diastolic filling.  3. Aortic valve sclerosis.     [facility]: 10/30/2020  CONCLUSIONS:  1. Successful PCI to severe proximal LCx stenosis with a 2.75 x 18 mm Resolute  NERIS post-dilated with a 2.75 mm NC balloon at 22 leonila.  2. Successful PCI to severe mid-LAD stenosis with a 2.5 x 34 mm Resolute NERIS  post-dilated distally with a 2.5 mm NC balloon at 22 leonila and proximally with a  2.75 mm NC balloon at 20-24 leonila.     There were no vitals filed for this visit.    Past Surgical History:   Procedure Laterality Date    CT ABDOMEN PELVIS ANGIOGRAM W AND/OR WO IV CONTRAST  10/28/2020    CT ABDOMEN PELVIS ANGIOGRAM W AND/OR WO IV CONTRAST 10/28/2020 [facility]    MASTECTOMY Left 1999    malignant    OTHER SURGICAL HISTORY  11/18/2020    Vascular surgical procedure    OTHER SURGICAL HISTORY  11/18/2020    Cardiac catheterization     Past Medical History:   Diagnosis Date    DM2 (diabetes mellitus, type 2) (CMS/[facility])     Dyslipidemia     Encounter for other preprocedural examination 10/19/2021    Pre-operative clearance    GERD (gastroesophageal reflux disease)     HTN (hypertension), benign     Pain in left hip 06/18/2020    Left hip pain     "Personal history of other diseases of the respiratory system 11/30/2020    History of acute bronchitis       Current Outpatient Medications:     albuterol 90 mcg/actuation inhaler, INHALE 2 INHALATIONS BY MOUTH  EVERY 6 HOURS IF NEEDED FOR  SHORTNESS OF BREATH, Disp: 17 g, Rfl: 2    amLODIPine (Norvasc) 5 mg tablet, TAKE 1 TABLET BY MOUTH DAILY, Disp: 90 tablet, Rfl: 3    aspirin 81 mg EC tablet, Take 1 tablet (81 mg) by mouth once daily., Disp: , Rfl:     BD Luer-Akilah Syringe 3 mL 22 x 1 1/2\" syringe, 1 every 2 weeks, Disp: , Rfl:     buPROPion XL (Wellbutrin XL) 150 mg 24 hr tablet, Take 1 tablet (150 mg) by mouth once daily in the morning. Do not crush, chew, or split., Disp: 90 tablet, Rfl: 1    clopidogrel (Plavix) 75 mg tablet, , Disp: , Rfl:     [START ON 3/22/2024] evolocumab (Repatha SureClick) 140 mg/mL injection, Inject 1 mL (140 mg) under the skin every 14 (fourteen) days. Do not start before March 22, 2024., Disp: 6 mL, Rfl: 3    evolocumab (Repatha SureClick) 140 mg/mL injection, Inject 1 mL (140 mg) under the skin every 14 (fourteen) days., Disp: 2 each, Rfl: 0    famotidine (Pepcid) 20 mg tablet, Take 1 tablet (20 mg) by mouth 2 times a day., Disp: 180 tablet, Rfl: 3    FreeStyle Test strip, Test twice daily as directed, Disp: 100 strip, Rfl: 1    hydrALAZINE (Apresoline) 50 mg tablet, TAKE 1 TABLET BY MOUTH TWICE  DAILY, Disp: 180 tablet, Rfl: 3    ipratropium-albuteroL (Duo-Neb) 0.5-2.5 mg/3 mL nebulizer solution, Take 3 mL by nebulization 4 times a day as needed for wheezing or shortness of breath., Disp: 90 mL, Rfl: 3    isosorbide mononitrate ER (Imdur) 30 mg 24 hr tablet, Take 1 tablet (30 mg) by mouth once daily., Disp: , Rfl:     metFORMIN  mg 24 hr tablet, Take 1 tablet (500 mg) by mouth 2 times a day with meals., Disp: 180 tablet, Rfl: 1    metoprolol succinate XL (Toprol-XL) 100 mg 24 hr tablet, TAKE 1 TABLET BY MOUTH DAILY, Disp: 90 tablet, Rfl: 3    nitroglycerin (Nitrostat) 0.4 mg " "SL tablet, Place 1 tablet (0.4 mg) under the tongue every 5 minutes if needed for chest pain., Disp: , Rfl:     pantoprazole (ProtoNix) 40 mg EC tablet, Take 1 tablet (40 mg) by mouth 2 times a day for 60 days, THEN 1 tablet (40 mg) once daily in the morning. Take before meals. Do not crush, chew, or split.., Disp: 180 tablet, Rfl: 0    syringe, disposable, (Carepoint Luer Slip Syringe) 1 mL syringe, , Disp: , Rfl:   Prior to Admission medications    Medication Sig Start Date End Date Taking? Authorizing Provider   albuterol 90 mcg/actuation inhaler INHALE 2 INHALATIONS BY MOUTH  EVERY 6 HOURS IF NEEDED FOR  SHORTNESS OF BREATH 10/3/23   Abbi Ballard MD   amLODIPine (Norvasc) 5 mg tablet TAKE 1 TABLET BY MOUTH DAILY 12/18/23   Sonido Escalante MD   aspirin 81 mg EC tablet Take 1 tablet (81 mg) by mouth once daily.    Historical Provider, MD   BD Luer-Akilah Syringe 3 mL 22 x 1 1/2\" syringe 1 every 2 weeks 10/10/22   Historical Provider, MD   buPROPion XL (Wellbutrin XL) 150 mg 24 hr tablet Take 1 tablet (150 mg) by mouth once daily in the morning. Do not crush, chew, or split. 2/21/24 8/19/24  Jessica Burnette PA-C   clopidogrel (Plavix) 75 mg tablet  5/8/23   Historical Provider, MD   evolocumab (Repatha SureClick) 140 mg/mL injection Inject 1 mL (140 mg) under the skin every 14 (fourteen) days. Do not start before March 22, 2024. 3/22/24 3/22/25  Jessica Burnette PA-C   evolocumab (Repatha SureClick) 140 mg/mL injection Inject 1 mL (140 mg) under the skin every 14 (fourteen) days. 2/21/24   Jessica Burnette PA-C   famotidine (Pepcid) 20 mg tablet Take 1 tablet (20 mg) by mouth 2 times a day. 2/27/24   KAVON Dooley-CNP   FreeStyle Test strip Test twice daily as directed 10/10/23   Jessica Burnette PA-C   hydrALAZINE (Apresoline) 50 mg tablet TAKE 1 TABLET BY MOUTH TWICE  DAILY 12/18/23   Sonido Escalante MD   ipratropium-albuteroL (Duo-Neb) 0.5-2.5 mg/3 mL nebulizer solution Take 3 mL by " nebulization 4 times a day as needed for wheezing or shortness of breath. 10/10/23 10/9/24  Jessica Burnette PA-C   isosorbide mononitrate ER (Imdur) 30 mg 24 hr tablet Take 1 tablet (30 mg) by mouth once daily. 12/8/21   Historical Provider, MD   metFORMIN  mg 24 hr tablet Take 1 tablet (500 mg) by mouth 2 times a day with meals. 2/21/24 8/19/24  Jessica Burnette PA-C   metoprolol succinate XL (Toprol-XL) 100 mg 24 hr tablet TAKE 1 TABLET BY MOUTH DAILY 12/18/23   Sonido Escalante MD   nitroglycerin (Nitrostat) 0.4 mg SL tablet Place 1 tablet (0.4 mg) under the tongue every 5 minutes if needed for chest pain.    Historical Provider, MD   pantoprazole (ProtoNix) 40 mg EC tablet Take 1 tablet (40 mg) by mouth 2 times a day for 60 days, THEN 1 tablet (40 mg) once daily in the morning. Take before meals. Do not crush, chew, or split.. 11/14/23 3/13/24  KAVON Dooley-CNP   syringe, disposable, (Carepoint Luer Slip Syringe) 1 mL syringe     Historical Provider, MD   ergocalciferol (Vitamin D-2) 1.25 MG (78508 UT) capsule Take 1 capsule (1,250 mcg) by mouth 1 (one) time per week. 7/26/23 3/13/24  Jessica Burnette PA-C     Allergies   Allergen Reactions    Adhesive Tape-Silicones Rash    Bloodstop Bandage Rash     Social History     Tobacco Use    Smoking status: Former     Packs/day: 0.50     Years: 5.00     Additional pack years: 0.00     Total pack years: 2.50     Types: Cigarettes    Smokeless tobacco: Never   Substance Use Topics    Alcohol use: Yes     Comment: rare         Chemistry    Lab Results   Component Value Date/Time     03/13/2024 0837    K 4.6 03/13/2024 0837     03/13/2024 0837    CO2 28 03/13/2024 0837    BUN 21 03/13/2024 0837    CREATININE 0.90 03/13/2024 0837    Lab Results   Component Value Date/Time    CALCIUM 9.3 03/13/2024 0837    ALKPHOS 58 03/13/2024 0837    AST 16 03/13/2024 0837    ALT 13 03/13/2024 0837    BILITOT 0.4 03/13/2024 0837          Lab Results    Component Value Date/Time    WBC 6.5 03/13/2024 0837    HGB 13.0 03/13/2024 0837    HCT 39.6 03/13/2024 0837     03/13/2024 0837     Lab Results   Component Value Date/Time    PROTIME 13.3 05/06/2022 1742    INR 1.1 05/06/2022 1742     Encounter Date: 11/09/23   ECG 12 lead (Clinic Performed)   Result Value    Ventricular Rate 60    Atrial Rate 60    MI Interval 134    QRS Duration 90    QT Interval 418    QTC Calculation(Bazett) 418    P Axis 48    R Axis 14    T Axis 62    QRS Count 10    Q Onset 215    P Onset 148    P Offset 194    T Offset 424    QTC Fredericia 418    Narrative    Normal sinus rhythm  Cannot rule out Anterior infarct , age undetermined  Abnormal ECG  When compared with ECG of 24-APR-2023 12:12,  No significant change was found  Confirmed by Sonido Escalante ([id number]) on 11/13/2023 8:56:00 PM     No results found for this or any previous visit from the past 1095 days.              NPO Detail:  No data recorded     PHYSICAL EXAM    Anesthesia Plan    History of general anesthesia?: yes  History of complications of general anesthesia?: no    ASA 3     MAC     The patient is not a current smoker.

## 2024-03-14 NOTE — PREPROCEDURE INSTRUCTIONS
"   Medication List            Accurate as of March 14, 2024  1:56 PM. Always use your most recent med list.                albuterol 90 mcg/actuation inhaler  INHALE 2 INHALATIONS BY MOUTH  EVERY 6 HOURS IF NEEDED FOR  SHORTNESS OF BREATH  Medication Adjustments for Surgery: Take morning of surgery with sip of water, no other fluids     amLODIPine 5 mg tablet  Commonly known as: Norvasc  TAKE 1 TABLET BY MOUTH DAILY  Medication Adjustments for Surgery: Take morning of surgery with sip of water, no other fluids     aspirin 81 mg EC tablet  Medication Adjustments for Surgery: Stop 1 day before surgery     BD Luer-Akilah Syringe 3 mL 22 x 1 1/2\" syringe  Generic drug: syringe with needle  Medication Adjustments for Surgery: Take morning of surgery with sip of water, no other fluids     buPROPion  mg 24 hr tablet  Commonly known as: Wellbutrin XL  Take 1 tablet (150 mg) by mouth once daily in the morning. Do not crush, chew, or split.  Medication Adjustments for Surgery: Take morning of surgery with sip of water, no other fluids     Carepoint Luer Slip Syringe 1 mL syringe  Generic drug: syringe (disposable)  Medication Adjustments for Surgery: Take morning of surgery with sip of water, no other fluids     clopidogrel 75 mg tablet  Commonly known as: Plavix  Medication Adjustments for Surgery: Other (Comment)  Notes to patient: WE WILL CONTACT YOU ABOUT WHEN TO STOP PLAVIX PER CARDIOLOGY     * evolocumab 140 mg/mL injection  Commonly known as: Repatha SureClick  Inject 1 mL (140 mg) under the skin every 14 (fourteen) days.  Medication Adjustments for Surgery: Take morning of surgery with sip of water, no other fluids     * evolocumab 140 mg/mL injection  Commonly known as: Repatha SureClick  Inject 1 mL (140 mg) under the skin every 14 (fourteen) days. Do not start before March 22, 2024.  Start taking on: March 22, 2024  Medication Adjustments for Surgery: Take morning of surgery with sip of water, no other fluids   "   famotidine 20 mg tablet  Commonly known as: Pepcid  Take 1 tablet (20 mg) by mouth 2 times a day.  Medication Adjustments for Surgery: Stop 7 days before surgery     FreeStyle Test strip  Generic drug: blood sugar diagnostic  Test twice daily as directed  Medication Adjustments for Surgery: Take morning of surgery with sip of water, no other fluids     hydrALAZINE 50 mg tablet  Commonly known as: Apresoline  TAKE 1 TABLET BY MOUTH TWICE  DAILY  Medication Adjustments for Surgery: Take morning of surgery with sip of water, no other fluids     ipratropium-albuteroL 0.5-2.5 mg/3 mL nebulizer solution  Commonly known as: Duo-Neb  Take 3 mL by nebulization 4 times a day as needed for wheezing or shortness of breath.  Medication Adjustments for Surgery: Take morning of surgery with sip of water, no other fluids     isosorbide mononitrate ER 30 mg 24 hr tablet  Commonly known as: Imdur  Medication Adjustments for Surgery: Take morning of surgery with sip of water, no other fluids     metFORMIN  mg 24 hr tablet  Commonly known as: Glucophage-XR  Take 1 tablet (500 mg) by mouth 2 times a day with meals.  Medication Adjustments for Surgery: Continue until night before surgery     metoprolol succinate  mg 24 hr tablet  Commonly known as: Toprol-XL  TAKE 1 TABLET BY MOUTH DAILY  Medication Adjustments for Surgery: Take morning of surgery with sip of water, no other fluids     nitroglycerin 0.4 mg SL tablet  Commonly known as: Nitrostat  Medication Adjustments for Surgery: Other (Comment)     pantoprazole 40 mg EC tablet  Commonly known as: ProtoNix  Take 1 tablet (40 mg) by mouth 2 times a day for 60 days, THEN 1 tablet (40 mg) once daily in the morning. Take before meals. Do not crush, chew, or split..  Start taking on: November 14, 2023  Medication Adjustments for Surgery: Stop 7 days before surgery           * This list has 2 medication(s) that are the same as other medications prescribed for you. Read the  directions carefully, and ask your doctor or other care provider to review them with you.                                  NPO Instructions:    Do not eat any food after midnight the night before your surgery/procedure.  You may have clear liquids until TWO hours before surgery/procedure. This includes water, black tea/coffee, (no milk or cream) apple juice and electrolyte drinks (Gatorade).  You may chew gum up to TWO hours before your surgery/procedure.    Additional Instructions:     Review your medication instructions, take indicated medications  If you have diabetes, please check your fasting blood sugar upon awakening.  If fasting blood sugar is <80 mg/dl, drink 100 ml of apple juice, time limit of 2 hours before  You may have clear liquids until TWO hours before surgery/procedure.  This includes water, black tea/coffee, (no milk or cream) apple juice and electrolyte drinks (Gatorade)  You may chew gum up to TWO hours before your surgery/procedure  Wear  comfortable loose fitting clothing  Do not use moisturizers, creams, lotions or perfume  All jewelry and valuables should be left at home    We will call you the business day before your procedure between 1-3p to confirm your procedure and arrival time  Please park in the back of the hospital, in the ED parking and proceed to the second floor  Please make arrangements to have a responsible adult take you home and stay with you for 24 hours  Please bring your ID and insurance card to your procedure  When checked in to the outpatient unit, you will be asked to change into a hospital gown and remove all clothing, including underwear  An IV will be inserted   Your family or  will be asked to wait in the waiting room or cafeteria and will be notified when able to come sit with you  You will need a cardiac clearance from Dr Davila-Office already aware  We will notify you about when to stop plavix  Please call [phone] with any further  questions

## 2024-03-22 ENCOUNTER — DOCUMENTATION (OUTPATIENT)
Dept: PREADMISSION TESTING | Facility: HOSPITAL | Age: 79
End: 2024-03-22
Payer: MEDICARE

## 2024-03-25 ENCOUNTER — TELEPHONE (OUTPATIENT)
Dept: PREADMISSION TESTING | Facility: HOSPITAL | Age: 79
End: 2024-03-25
Payer: MEDICARE

## 2024-03-27 DIAGNOSIS — R07.9 CHEST PAIN, UNSPECIFIED TYPE: ICD-10-CM

## 2024-03-27 RX ORDER — ISOSORBIDE MONONITRATE 30 MG/1
30 TABLET, EXTENDED RELEASE ORAL DAILY
Qty: 90 TABLET | Refills: 3 | Status: SHIPPED | OUTPATIENT
Start: 2024-03-27 | End: 2025-03-27

## 2024-03-29 DIAGNOSIS — K21.9 GASTROESOPHAGEAL REFLUX DISEASE, UNSPECIFIED WHETHER ESOPHAGITIS PRESENT: ICD-10-CM

## 2024-03-29 DIAGNOSIS — R11.2 NAUSEA AND VOMITING, UNSPECIFIED VOMITING TYPE: ICD-10-CM

## 2024-03-29 RX ORDER — PANTOPRAZOLE SODIUM 40 MG/1
TABLET, DELAYED RELEASE ORAL
Qty: 150 TABLET | Refills: 3 | Status: SHIPPED | OUTPATIENT
Start: 2024-03-29

## 2024-04-01 ENCOUNTER — HOSPITAL ENCOUNTER (OUTPATIENT)
Dept: RADIOLOGY | Facility: HOSPITAL | Age: 79
Discharge: HOME | End: 2024-04-01
Payer: MEDICARE

## 2024-04-01 DIAGNOSIS — R41.3 OTHER AMNESIA: ICD-10-CM

## 2024-04-01 PROCEDURE — 70551 MRI BRAIN STEM W/O DYE: CPT

## 2024-04-01 PROCEDURE — 70551 MRI BRAIN STEM W/O DYE: CPT | Performed by: RADIOLOGY

## 2024-04-08 DIAGNOSIS — R41.3 OTHER AMNESIA: Primary | ICD-10-CM

## 2024-04-18 ENCOUNTER — LAB (OUTPATIENT)
Dept: LAB | Facility: LAB | Age: 79
End: 2024-04-18
Payer: MEDICARE

## 2024-04-18 DIAGNOSIS — R41.3 OTHER AMNESIA: ICD-10-CM

## 2024-04-18 LAB
AMMONIA PLAS-SCNC: 14 UMOL/L (ref 16–53)
TSH SERPL-ACNC: 2.29 MIU/L (ref 0.44–3.98)

## 2024-04-18 PROCEDURE — 36415 COLL VENOUS BLD VENIPUNCTURE: CPT

## 2024-04-18 PROCEDURE — 86780 TREPONEMA PALLIDUM: CPT

## 2024-04-19 LAB — TREPONEMA PALLIDUM IGG+IGM AB [PRESENCE] IN SERUM OR PLASMA BY IMMUNOASSAY: NONREACTIVE

## 2024-04-26 ENCOUNTER — HOSPITAL ENCOUNTER (OUTPATIENT)
Dept: GASTROENTEROLOGY | Facility: HOSPITAL | Age: 79
Setting detail: OUTPATIENT SURGERY
Discharge: HOME | End: 2024-04-26
Payer: MEDICARE

## 2024-04-26 ENCOUNTER — ANESTHESIA EVENT (OUTPATIENT)
Dept: GASTROENTEROLOGY | Facility: HOSPITAL | Age: 79
End: 2024-04-26
Payer: MEDICARE

## 2024-04-26 ENCOUNTER — ANESTHESIA (OUTPATIENT)
Dept: GASTROENTEROLOGY | Facility: HOSPITAL | Age: 79
End: 2024-04-26
Payer: MEDICARE

## 2024-04-26 VITALS
BODY MASS INDEX: 21.48 KG/M2 | DIASTOLIC BLOOD PRESSURE: 54 MMHG | OXYGEN SATURATION: 93 % | WEIGHT: 121.25 LBS | SYSTOLIC BLOOD PRESSURE: 138 MMHG | HEART RATE: 69 BPM | TEMPERATURE: 96.8 F | HEIGHT: 63 IN | RESPIRATION RATE: 16 BRPM

## 2024-04-26 DIAGNOSIS — K21.9 GASTROESOPHAGEAL REFLUX DISEASE, UNSPECIFIED WHETHER ESOPHAGITIS PRESENT: ICD-10-CM

## 2024-04-26 PROBLEM — Z87.09 PERSONAL HISTORY OF OTHER DISEASES OF THE RESPIRATORY SYSTEM: Status: ACTIVE | Noted: 2024-04-26

## 2024-04-26 PROCEDURE — 2500000004 HC RX 250 GENERAL PHARMACY W/ HCPCS (ALT 636 FOR OP/ED)

## 2024-04-26 PROCEDURE — 2500000005 HC RX 250 GENERAL PHARMACY W/O HCPCS

## 2024-04-26 PROCEDURE — 7100000010 HC PHASE TWO TIME - EACH INCREMENTAL 1 MINUTE

## 2024-04-26 PROCEDURE — 7100000009 HC PHASE TWO TIME - INITIAL BASE CHARGE

## 2024-04-26 PROCEDURE — 3700000001 HC GENERAL ANESTHESIA TIME - INITIAL BASE CHARGE

## 2024-04-26 PROCEDURE — 43235 EGD DIAGNOSTIC BRUSH WASH: CPT | Performed by: INTERNAL MEDICINE

## 2024-04-26 PROCEDURE — 3700000002 HC GENERAL ANESTHESIA TIME - EACH INCREMENTAL 1 MINUTE

## 2024-04-26 RX ORDER — PROPOFOL 10 MG/ML
INJECTION, EMULSION INTRAVENOUS AS NEEDED
Status: DISCONTINUED | OUTPATIENT
Start: 2024-04-26 | End: 2024-04-26

## 2024-04-26 RX ORDER — LIDOCAINE HCL/PF 100 MG/5ML
SYRINGE (ML) INTRAVENOUS AS NEEDED
Status: DISCONTINUED | OUTPATIENT
Start: 2024-04-26 | End: 2024-04-26

## 2024-04-26 RX ORDER — SODIUM CHLORIDE, SODIUM LACTATE, POTASSIUM CHLORIDE, CALCIUM CHLORIDE 600; 310; 30; 20 MG/100ML; MG/100ML; MG/100ML; MG/100ML
20 INJECTION, SOLUTION INTRAVENOUS CONTINUOUS
Status: DISCONTINUED | OUTPATIENT
Start: 2024-04-26 | End: 2024-04-27 | Stop reason: HOSPADM

## 2024-04-26 RX ORDER — NEOMYCIN SULFATE, POLYMYXIN B SULFATE AND DEXAMETHASONE 3.5; 10000; 1 MG/ML; [USP'U]/ML; MG/ML
2 SUSPENSION/ DROPS OPHTHALMIC
COMMUNITY
Start: 2024-04-22 | End: 2024-05-16 | Stop reason: ALTCHOICE

## 2024-04-26 RX ORDER — SODIUM CHLORIDE 50 MG/ML
2 SOLUTION/ DROPS OPHTHALMIC
COMMUNITY
Start: 2024-04-22

## 2024-04-26 RX ADMIN — LIDOCAINE HYDROCHLORIDE 50 MG: 20 INJECTION, SOLUTION INTRAVENOUS at 09:58

## 2024-04-26 RX ADMIN — SODIUM CHLORIDE, POTASSIUM CHLORIDE, SODIUM LACTATE AND CALCIUM CHLORIDE 20 ML/HR: 600; 310; 30; 20 INJECTION, SOLUTION INTRAVENOUS at 08:53

## 2024-04-26 RX ADMIN — PROPOFOL 50 MG: 10 INJECTION, EMULSION INTRAVENOUS at 09:58

## 2024-04-26 SDOH — HEALTH STABILITY: MENTAL HEALTH: CURRENT SMOKER: 0

## 2024-04-26 ASSESSMENT — ENCOUNTER SYMPTOMS
NAUSEA: 1
DIARRHEA: 0
NEUROLOGICAL NEGATIVE: 1
MUSCULOSKELETAL NEGATIVE: 1
DIAPHORESIS: 0
VOMITING: 1
FATIGUE: 0
CHILLS: 0
WOUND: 0
FEVER: 0
SHORTNESS OF BREATH: 0

## 2024-04-26 ASSESSMENT — PAIN SCALES - GENERAL
PAINLEVEL_OUTOF10: 0 - NO PAIN
PAIN_LEVEL: 0
PAINLEVEL_OUTOF10: 0 - NO PAIN
PAINLEVEL_OUTOF10: 0 - NO PAIN

## 2024-04-26 ASSESSMENT — PAIN - FUNCTIONAL ASSESSMENT
PAIN_FUNCTIONAL_ASSESSMENT: 0-10

## 2024-04-26 ASSESSMENT — COLUMBIA-SUICIDE SEVERITY RATING SCALE - C-SSRS
2. HAVE YOU ACTUALLY HAD ANY THOUGHTS OF KILLING YOURSELF?: NO
1. IN THE PAST MONTH, HAVE YOU WISHED YOU WERE DEAD OR WISHED YOU COULD GO TO SLEEP AND NOT WAKE UP?: NO
6. HAVE YOU EVER DONE ANYTHING, STARTED TO DO ANYTHING, OR PREPARED TO DO ANYTHING TO END YOUR LIFE?: NO

## 2024-04-26 NOTE — DISCHARGE INSTRUCTIONS

## 2024-04-26 NOTE — ANESTHESIA PREPROCEDURE EVALUATION
"Patient: Janine Hendrix \"Neeta\"    Procedure Information       Date/Time: 04/26/24 0930    Scheduled providers: Talib Ramirez DO    Procedure: EGD    Location: [facility]            Relevant Problems   Anesthesia (within normal limits)      Cardiac   (+) Chest pain   (+) Coronary artery disease involving native coronary artery of native heart without angina pectoris   (+) Essential hypertension   (+) PAD (peripheral artery disease) (CMS-HCC)   (+) Thrombosis of external iliac artery (Multi)      Pulmonary   (+) Aspiration pneumonia (Multi)      Neuro   (+) Anxiety with depression   (+) Bilateral carotid artery stenosis   (+) Dementia without behavioral disturbance (Multi)   (+) Panic attacks      GI   (+) GERD (gastroesophageal reflux disease)   (+) Oropharyngeal dysphagia      Endocrine   (+) DM2 (diabetes mellitus, type 2) (Multi)   (+) Elevated TSH   (+) Overweight   (+) Type 2 diabetes mellitus without complication, without long-term current use of insulin (Multi)   (+) Vitamin D deficiency      Hematology   (+) Anemia   (+) Pernicious anemia      Musculoskeletal   (+) Chronic low back pain   (+) Chronic right hip pain   (+) Hip pain, right   (+) Left hip pain   (+) Pain of left upper extremity   (+) Pain of right upper extremity   (+) Right leg claudication (CMS-HCC)      Skin   (+) Abnormal skin growth   (+) Changing skin lesion   (+) SCC (squamous cell carcinoma)      Nervous   (+) Insomnia   (+) Neck pain   (+) Numbness   (+) Sensation, choking      Circulatory   (+) History of heart artery stent   (+) Injury of right common femoral artery   (+) Intraoperative complication involving circulatory system associated with cardiac catheterization      Digestive   (+) Oropharyngeal aspiration      ENT   (+) Allergic rhinitis   (+) BPPV (benign paroxysmal positional vertigo)      Pulmonary and Pneumonias   (+) Personal history of other diseases of the respiratory system      Symptoms and Signs   (+) " Chronic fatigue and malaise   (+) Other fatigue      Cardiac and Vasculature   (+) Dyslipidemia      Gastrointestinal and Abdominal   (+) Dysphagia, cricopharyngeal   (+) Zenker's diverticulum      Genitourinary and Reproductive   (+) Hypokalemia   (+) Hypomagnesemia      Hematology and Neoplasia   (+) History of total mastectomy of left breast      Musculoskeletal and Injuries   (+) Low back pain       Clinical information reviewed:   Tobacco  Allergies  Meds  Problems  Med Hx  Surg Hx  OB Status    Fam Hx  Soc Hx        NPO Detail:  NPO/Void Status  Carbohydrate Drink Given Prior to Surgery? : N  Date of Last Liquid: 04/26/24  Time of Last Liquid: 0730  Date of Last Solid: 04/25/24  Time of Last Solid: 2230  Last Intake Type: Clear fluids  Time of Last Void: 0800         Physical Exam    Airway  Mallampati: II  TM distance: >3 FB  Neck ROM: limited     Cardiovascular - normal exam     Dental   (+) upper dentures       Pulmonary - normal exam     Abdominal            Anesthesia Plan    History of general anesthesia?: yes  History of complications of general anesthesia?: no    ASA 3     MAC     The patient is not a current smoker.  Patient did not smoke on day of procedure.    intravenous induction   Anesthetic plan and risks discussed with patient.

## 2024-04-26 NOTE — ANESTHESIA POSTPROCEDURE EVALUATION
"Patient: Janine Hendrix \"Neeta\"    Procedure Summary       Date: 04/26/24 Room / Location: [facility]    Anesthesia Start: 0952 Anesthesia Stop: 1008    Procedure: EGD Diagnosis: Gastroesophageal reflux disease, unspecified whether esophagitis present    Scheduled Providers: Talib Ramirez DO Responsible Provider: KATHY Krause    Anesthesia Type: MAC ASA Status: 3            Anesthesia Type: MAC    Vitals Value Taken Time   /54 04/26/24 1022   Temp 36 °C (96.8 °F) 04/26/24 1007   Pulse 69 04/26/24 1022   Resp 16 04/26/24 1022   SpO2 93 % 04/26/24 1022       Anesthesia Post Evaluation    Patient location during evaluation: PACU  Patient participation: complete - patient participated  Level of consciousness: awake and alert  Pain score: 0  Pain management: adequate  Airway patency: patent  Cardiovascular status: acceptable  Respiratory status: acceptable  Hydration status: acceptable  Postoperative Nausea and Vomiting: none        No notable events documented.    "

## 2024-04-26 NOTE — H&P
History Of Present Illness  Neeta Hendrix is a 78 y.o. female presenting with nausea/vomiting. She is here for an EGD. She was last seen with Zoe Rico NP in the GI clinic on 12/18/24. She has a hx of gastric ulcer in 2019. She has had multiple EGDs in the past. Last 2020 with Dr. Ordonez: LA grade a reflux esophagitis, biopsies with acute and chronic inflammation, no dysplasia. small hiatal hernia, chronic gastritis, normal duodenum. A recent MRCP which revealed a 7 mm pancreatic duct dilation. Margot has ordered a fecal pancreatic elastase. This was not done. She reports that her nausea and vomiting have subsided. Reports throat surgery years ago that causes extra phlegm. She is on plavix for stent placement in April 2023. Stopped yesterday and this morning. Okay per Dr. Ramirez.      Past Medical History  Past Medical History:   Diagnosis Date    DM2 (diabetes mellitus, type 2) (Multi)     Dyslipidemia     Encounter for other preprocedural examination 10/19/2021    Pre-operative clearance    GERD (gastroesophageal reflux disease)     HTN (hypertension), benign     Pain in left hip 06/18/2020    Left hip pain    Personal history of other diseases of the respiratory system 11/30/2020    History of acute bronchitis       Surgical History  Past Surgical History:   Procedure Laterality Date    CT ABDOMEN PELVIS ANGIOGRAM W AND/OR WO IV CONTRAST  10/28/2020    CT ABDOMEN PELVIS ANGIOGRAM W AND/OR WO IV CONTRAST 10/28/2020 [facility]    MASTECTOMY Left 1999    malignant    OTHER SURGICAL HISTORY  11/18/2020    Vascular surgical procedure    OTHER SURGICAL HISTORY  11/18/2020    Cardiac catheterization        Social History  She reports that she quit smoking about 5 years ago. Her smoking use included cigarettes. She started smoking about 10 years ago. She has a 2.5 pack-year smoking history. She has never used smokeless tobacco. She reports current alcohol use. She reports that she does not use  drugs.    Family History  Family History   Problem Relation Name Age of Onset    Breast cancer Mother      No Known Problems Father      No Known Problems Other          Allergies  Adhesive tape-silicones and Bloodstop bandage    Review of Systems   Constitutional:  Negative for chills, diaphoresis, fatigue and fever.   HENT: Negative.     Respiratory:  Negative for shortness of breath.    Cardiovascular:  Negative for chest pain.   Gastrointestinal:  Positive for nausea and vomiting. Negative for diarrhea.   Genitourinary: Negative.    Musculoskeletal: Negative.    Skin:  Negative for pallor, rash and wound.   Neurological: Negative.         Physical Exam  Constitutional:       General: She is not in acute distress.     Appearance: Normal appearance.   HENT:      Head: Normocephalic.      Mouth/Throat:      Mouth: Mucous membranes are moist.   Eyes:      General: No scleral icterus.     Conjunctiva/sclera: Conjunctivae normal.      Pupils: Pupils are equal, round, and reactive to light.   Cardiovascular:      Rate and Rhythm: Normal rate and regular rhythm.      Pulses: Normal pulses.      Heart sounds: Normal heart sounds.   Pulmonary:      Effort: Pulmonary effort is normal. No respiratory distress.      Breath sounds: Normal breath sounds.   Abdominal:      General: Abdomen is flat. Bowel sounds are normal. There is no distension.      Palpations: Abdomen is soft. There is no mass.      Tenderness: There is no abdominal tenderness. There is no guarding or rebound.   Musculoskeletal:         General: Normal range of motion.   Skin:     General: Skin is warm and dry.   Neurological:      General: No focal deficit present.      Mental Status: She is alert and oriented to person, place, and time. Mental status is at baseline.   Psychiatric:         Mood and Affect: Mood normal.          Last Recorded Vitals  There were no vitals taken for this visit.    Relevant Results  CT ABDOMEN W IV CONTRAST 12/1/23  -  Impression -  1. Dilated main pancreatic duct up to 6 mm in the pancreatic head  without CT evident obstructing mass. Consider further evaluation with  MRCP.  2. No cholelithiasis or acute cholecystitis. No bile duct dilation.  No bowel obstruction.  3. Increased size infrarenal AAA, 4.1 cm, previously 3.7 cm.  4. Right middle lobe patchy ground-glass opacity, likely  infectious/inflammatory. Consider follow-up in 3-6 months to  resolution.     MRCP pancreas with and without contrast 12/11/23; IMPRESSION:  1.  No acute findings in the abdomen.  2. Dilated pancreatic duct up to 7 mm. No visualized obstructing  lesion or mass.  3. 4.1 cm infrarenal aortic aneurysm.  A1C 6.8  Esophageal manometry 9/15/2020-   Impressions       Overall the patient has a normal motiity with a hiatal hernia. The LES        relaxes and esophageal body is normal. The IRP is elevated but this is        likely due to hiata       IMPEDANCE:       - There was complete bolus clearance in 100 % of swallows (normal > 70        percent).  EGD 9/15/2020 with Dr. Petra MALDONADO grade a reflux esophagitis, biopsies noted acute and chronic inflammation, no dysplasia. small hiatal hernia.  Chronic gastritis, normal duodenum.  EGD 8/7/2019 with Dr. Kamara-  Findings:  ESOPHAGUS: The esophageal mucosa appeared normal including the Z line at 35 cm. There was evidence of erosive gastritis with a prepyloric clean-based ulcer identified.  The ulcer was shallow measuring around 6 mm in diameter.  The ulcer at the typical appearance of a peptic ulcer related to NSAIDs. Biopsies negative for h. Pylori. The duodenum appeared normal to D3  Repeat EGD 10/10/2019 with Dr. Kamara- single gastic erosion, no evidence of previously noted gastric ulcer.  Gastric biopsy with minimal inflammation, no H. pylori  Last colonoscopy 11/13/15 with Dr. Kamara-due in 2025     Assessment/Plan   Nausea/vomiting    EGD       I spent 25 minutes in the professional and overall care of this  patient.      Conchis Mccann PA-C

## 2024-04-29 ASSESSMENT — PAIN SCALES - GENERAL: PAINLEVEL_OUTOF10: 0 - NO PAIN

## 2024-05-08 ENCOUNTER — TRANSCRIBE ORDERS (OUTPATIENT)
Dept: ORTHOPEDIC SURGERY | Facility: HOSPITAL | Age: 79
End: 2024-05-08
Payer: MEDICARE

## 2024-05-08 DIAGNOSIS — M54.50 LOW BACK PAIN, UNSPECIFIED BACK PAIN LATERALITY, UNSPECIFIED CHRONICITY, UNSPECIFIED WHETHER SCIATICA PRESENT: ICD-10-CM

## 2024-05-10 ENCOUNTER — HOSPITAL ENCOUNTER (OUTPATIENT)
Dept: RADIOLOGY | Facility: CLINIC | Age: 79
Discharge: HOME | End: 2024-05-10
Payer: MEDICARE

## 2024-05-10 ENCOUNTER — OFFICE VISIT (OUTPATIENT)
Dept: ORTHOPEDIC SURGERY | Facility: CLINIC | Age: 79
End: 2024-05-10
Payer: MEDICARE

## 2024-05-10 ENCOUNTER — APPOINTMENT (OUTPATIENT)
Dept: VASCULAR MEDICINE | Facility: HOSPITAL | Age: 79
End: 2024-05-10
Payer: MEDICARE

## 2024-05-10 ENCOUNTER — APPOINTMENT (OUTPATIENT)
Dept: CARDIOLOGY | Facility: HOSPITAL | Age: 79
End: 2024-05-10
Payer: MEDICARE

## 2024-05-10 VITALS — HEIGHT: 63 IN | BODY MASS INDEX: 21.44 KG/M2 | WEIGHT: 121 LBS

## 2024-05-10 DIAGNOSIS — M54.50 LOW BACK PAIN, UNSPECIFIED BACK PAIN LATERALITY, UNSPECIFIED CHRONICITY, UNSPECIFIED WHETHER SCIATICA PRESENT: ICD-10-CM

## 2024-05-10 DIAGNOSIS — M54.50 LUMBAR BACK PAIN: ICD-10-CM

## 2024-05-10 DIAGNOSIS — M96.1 LUMBAR POST-LAMINECTOMY SYNDROME: Primary | ICD-10-CM

## 2024-05-10 PROCEDURE — 72110 X-RAY EXAM L-2 SPINE 4/>VWS: CPT | Performed by: RADIOLOGY

## 2024-05-10 PROCEDURE — 1159F MED LIST DOCD IN RCRD: CPT | Performed by: ORTHOPAEDIC SURGERY

## 2024-05-10 PROCEDURE — 72110 X-RAY EXAM L-2 SPINE 4/>VWS: CPT

## 2024-05-10 PROCEDURE — 99213 OFFICE O/P EST LOW 20 MIN: CPT | Performed by: ORTHOPAEDIC SURGERY

## 2024-05-10 ASSESSMENT — PAIN - FUNCTIONAL ASSESSMENT: PAIN_FUNCTIONAL_ASSESSMENT: 0-10

## 2024-05-10 NOTE — LETTER
May 10, 2024     Jessica Burnette PA-C  [address] [facility] [address]    Patient: Neeta Hendrix   YOB: 1945   Date of Visit: 5/10/2024       Dear Dr. Jessica Burnette PA-C:    Thank you for referring Neeta Hendrix to me for evaluation. Below are my notes for this consultation.  If you have questions, please do not hesitate to call me. I look forward to following your patient along with you.       Sincerely,     Julien Rodriguez MD      CC: No Recipients  ______________________________________________________________________________________    HPI:Janine Hendrix is a 78-year-old woman with past medical history significant for prior L3-S1 laminectomy fusion with Dr. Irene Jenkins several years ago.  She comes in today with continued low back pain.  Patient states that she is walking little better than she was prior to her surgery.  She has no significant leg pain.  She has activity related low back pain.  She has not done recent physical therapy.      ROS:  Reviewed on EMR and patient intake sheet.    PMH/SH:  Reviewed on EMR and patient intake sheet.    Exam:  Physical Exam    Constitutional: Well appearing; no acute distress  Eyes: pupils are equal and round  Psych: normal affect  Respiratory: non-labored breathing  Cardiovascular: regular rate and rhythm  GI: non-distended abdomen  Musculoskeletal: no pain with range of motion of the hips bilaterally  Neurologic: [4]/5 strength in the lower extremities bilaterally]; [negative] straight leg raise    Radiology:     X-rays demonstrate stable L3-S1 laminectomy and fusion.  No significant adjacent segment changes.    Diagnosis:    Lumbar laminectomy syndrome    Assessment and Plan:   78-year-old woman, with chronic low back pain secondary to prior multilevel lumbar laminectomy and fusion.  I recommended physical therapy and core strengthening.  I can see her back as needed.    The patient was in agreement with the plan. At the end of the visit  today, the patient felt that all questions had been answered satisfactorily.  The patient was pleased with the visit and very appreciative for the care rendered.     Thank you very much for the kind referral.  It is a privilege, and a pleasure, to partner with you in the care of your patients.  I would be delighted to assist you with any further consultations as needed.          Julien Rodriguez MD    Chief of Spine Surgery, [facility]  Director of Spine Service, [facility]  , Department of Orthopaedics  [employer]  [id number] [id number]  P: [phone]  [facility].[employer]    This note was dictated with voice recognition software.  It has not been proofread for grammatical errors, typographical mistakes or other semantic inconsistencies.

## 2024-05-14 RX ORDER — DEXTROSE 4 G
TABLET,CHEWABLE ORAL
COMMUNITY

## 2024-05-15 ENCOUNTER — HOSPITAL ENCOUNTER (OUTPATIENT)
Dept: RADIOLOGY | Facility: HOSPITAL | Age: 79
Discharge: HOME | End: 2024-05-15
Payer: MEDICARE

## 2024-05-15 DIAGNOSIS — Z78.0 ASYMPTOMATIC POSTMENOPAUSAL STATE: ICD-10-CM

## 2024-05-15 PROCEDURE — 77080 DXA BONE DENSITY AXIAL: CPT

## 2024-05-15 PROCEDURE — 77080 DXA BONE DENSITY AXIAL: CPT | Performed by: RADIOLOGY

## 2024-05-16 ENCOUNTER — TELEPHONE (OUTPATIENT)
Dept: PRIMARY CARE | Facility: CLINIC | Age: 79
End: 2024-05-16
Payer: MEDICARE

## 2024-05-16 ENCOUNTER — OFFICE VISIT (OUTPATIENT)
Dept: CARDIOLOGY | Facility: HOSPITAL | Age: 79
End: 2024-05-16
Payer: MEDICARE

## 2024-05-16 VITALS
OXYGEN SATURATION: 97 % | SYSTOLIC BLOOD PRESSURE: 146 MMHG | DIASTOLIC BLOOD PRESSURE: 75 MMHG | HEART RATE: 67 BPM | BODY MASS INDEX: 21.64 KG/M2 | WEIGHT: 122.14 LBS

## 2024-05-16 DIAGNOSIS — E78.5 DYSLIPIDEMIA: ICD-10-CM

## 2024-05-16 DIAGNOSIS — E11.9 DIABETES MELLITUS TYPE II, NON INSULIN DEPENDENT (MULTI): ICD-10-CM

## 2024-05-16 DIAGNOSIS — I10 ESSENTIAL HYPERTENSION: ICD-10-CM

## 2024-05-16 DIAGNOSIS — E11.9 TYPE 2 DIABETES MELLITUS WITHOUT COMPLICATION, UNSPECIFIED WHETHER LONG TERM INSULIN USE (MULTI): ICD-10-CM

## 2024-05-16 DIAGNOSIS — I25.10 CORONARY ARTERY DISEASE INVOLVING NATIVE HEART, UNSPECIFIED VESSEL OR LESION TYPE, UNSPECIFIED WHETHER ANGINA PRESENT: Primary | ICD-10-CM

## 2024-05-16 DIAGNOSIS — R07.9 CHEST PAIN, UNSPECIFIED TYPE: ICD-10-CM

## 2024-05-16 PROCEDURE — 3078F DIAST BP <80 MM HG: CPT | Performed by: INTERNAL MEDICINE

## 2024-05-16 PROCEDURE — 99213 OFFICE O/P EST LOW 20 MIN: CPT | Performed by: INTERNAL MEDICINE

## 2024-05-16 PROCEDURE — 93005 ELECTROCARDIOGRAM TRACING: CPT | Performed by: INTERNAL MEDICINE

## 2024-05-16 PROCEDURE — 3077F SYST BP >= 140 MM HG: CPT | Performed by: INTERNAL MEDICINE

## 2024-05-16 PROCEDURE — 1036F TOBACCO NON-USER: CPT | Performed by: INTERNAL MEDICINE

## 2024-05-16 PROCEDURE — 93010 ELECTROCARDIOGRAM REPORT: CPT | Performed by: INTERNAL MEDICINE

## 2024-05-16 PROCEDURE — 1159F MED LIST DOCD IN RCRD: CPT | Performed by: INTERNAL MEDICINE

## 2024-05-16 RX ORDER — NITROGLYCERIN 0.4 MG/1
0.4 TABLET SUBLINGUAL EVERY 5 MIN PRN
Qty: 30 TABLET | Refills: 0 | Status: SHIPPED | OUTPATIENT
Start: 2024-05-16

## 2024-05-16 RX ORDER — CALCIUM CITRATE/VITAMIN D3 200MG-6.25
1 TABLET ORAL 3 TIMES DAILY
Qty: 300 STRIP | Refills: 3 | Status: SHIPPED | OUTPATIENT
Start: 2024-05-16

## 2024-05-16 NOTE — TELEPHONE ENCOUNTER
Patient states that the pharmacy is sending over a request for a machine and lantis and strips, she does not need all this, please call her to see what refills she needs.

## 2024-05-16 NOTE — PROGRESS NOTES
"Chief Complaint:   Follow-up     History Of Present Illness:    Neeta Hendrix is a 78 y.o. female presenting for follow-up.  Doing well from a cardiac standpoint--denies any chest pain, shortness of breath, dizziness, palpitations.  Unfortunately, she has had some decrease in her memory--is seeing neurology.     Last Recorded Vitals:  Vitals:    05/16/24 1105   BP: 146/75   Pulse: 67   SpO2: 97%   Weight: 55.4 kg (122 lb 2.2 oz)       Past Medical History:  She has a past medical history of DM2 (diabetes mellitus, type 2) (Multi), Dyslipidemia, Encounter for other preprocedural examination (10/19/2021), GERD (gastroesophageal reflux disease), HTN (hypertension), benign, Pain in left hip (06/18/2020), and Personal history of other diseases of the respiratory system (11/30/2020).    Past Surgical History:  She has a past surgical history that includes Other surgical history (11/18/2020); Other surgical history (11/18/2020); CT angio abdomen pelvis w and or wo IV IV contrast (10/28/2020); and Mastectomy (Left, 1999).      Social History:  She reports that she quit smoking about 5 years ago. Her smoking use included cigarettes. She started smoking about 10 years ago. She has a 2.5 pack-year smoking history. She has never used smokeless tobacco. She reports current alcohol use. She reports that she does not use drugs.    Family History:  Family History   Problem Relation Name Age of Onset    Breast cancer Mother      No Known Problems Father      No Known Problems Other          Allergies:  Adhesive tape-silicones and Bloodstop bandage    Outpatient Medications:  Current Outpatient Medications   Medication Instructions    albuterol 90 mcg/actuation inhaler INHALE 2 INHALATIONS BY MOUTH  EVERY 6 HOURS IF NEEDED FOR  SHORTNESS OF BREATH    amLODIPine (NORVASC) 5 mg, oral, Daily    aspirin 81 mg, oral, Daily    BD Luer-Akilah Syringe 3 mL 22 x 1 1/2\" syringe 1 every 2 weeks    blood sugar diagnostic (True Metrix Glucose Test " Strip) strip 1 strip, miscellaneous, 2 times daily    blood-glucose meter (True Metrix Glucose Meter) misc miscellaneous    buPROPion XL (WELLBUTRIN XL) 150 mg, oral, Every morning, Do not crush, chew, or split.    clopidogrel (Plavix) 75 mg tablet     evolocumab (REPATHA SURECLICK) 140 mg, subcutaneous, Every 14 days    evolocumab (REPATHA SURECLICK) 140 mg, subcutaneous, Every 14 days    famotidine (PEPCID) 20 mg, oral, 2 times daily    FreeStyle Test strip Test twice daily as directed    hydrALAZINE (APRESOLINE) 50 mg, oral, 2 times daily    ipratropium-albuteroL (Duo-Neb) 0.5-2.5 mg/3 mL nebulizer solution 3 mL, nebulization, 4 times daily PRN    isosorbide mononitrate ER (IMDUR) 30 mg, oral, Daily    metFORMIN XR (GLUCOPHAGE-XR) 500 mg, oral, 2 times daily (morning and late afternoon)    metoprolol succinate XL (TOPROL-XL) 100 mg, oral, Daily    nitroglycerin (NITROSTAT) 0.4 mg, sublingual, Every 5 min PRN    pantoprazole (ProtoNix) 40 mg EC tablet TAKE TAKE 1 TABLET BY MOUTH  TWICE DAILY FOR 60 DAYS, THEN 1  TABLET ONCE DAILY IN THE MORNING BEFORE MEALS THEREAFTER DO NOT  CRUSH, CHEW, OR SPLIT    sodium chloride (Heron 128) 5 % ophthalmic solution 2 drops    syringe, disposable, (Carepoint Luer Slip Syringe) 1 mL syringe miscellaneous       Physical Exam:  Constitutional:       Appearance: Healthy appearance. Not in distress.   Neck:      Vascular: No JVR. JVD normal.   Pulmonary:      Effort: Pulmonary effort is normal.      Breath sounds: Normal breath sounds. No wheezing. No rhonchi. No rales.   Chest:      Chest wall: Not tender to palpatation.   Cardiovascular:      Normal rate. Regular rhythm.      Murmurs: There is no murmur.   Edema:     Peripheral edema absent.   Abdominal:      General: Bowel sounds are normal.      Palpations: Abdomen is soft.      Tenderness: There is no abdominal tenderness.   Musculoskeletal: Normal range of motion. Skin:     General: Skin is warm and dry.   Neurological:       General: No focal deficit present.      Mental Status: Alert and oriented to person, place and time.           Last Labs:  CBC -  Lab Results   Component Value Date    WBC 6.5 03/13/2024    HGB 13.0 03/13/2024    HCT 39.6 03/13/2024    MCV 91 03/13/2024     03/13/2024       CMP -  Lab Results   Component Value Date    CALCIUM 9.3 03/13/2024    PHOS 3.2 12/08/2021    PROT 6.5 03/13/2024    ALBUMIN 3.9 03/13/2024    AST 16 03/13/2024    ALT 13 03/13/2024    ALKPHOS 58 03/13/2024    BILITOT 0.4 03/13/2024       LIPID PANEL -   Lab Results   Component Value Date    CHOL 141 03/13/2024    TRIG 122 03/13/2024    HDL 63.7 03/13/2024    CHHDL 2.2 03/13/2024    LDLF 48 03/20/2023    VLDL 24 03/13/2024    NHDL 77 03/13/2024       RENAL FUNCTION PANEL -   Lab Results   Component Value Date    GLUCOSE 129 (H) 03/13/2024     03/13/2024    K 4.6 03/13/2024     03/13/2024    CO2 28 03/13/2024    ANIONGAP 16 03/13/2024    BUN 21 03/13/2024    CREATININE 0.90 03/13/2024    CALCIUM 9.3 03/13/2024    PHOS 3.2 12/08/2021    ALBUMIN 3.9 03/13/2024        Lab Results   Component Value Date     (H) 10/05/2020    HGBA1C 6.8 (A) 09/27/2023       Last Cardiology Tests:  ECG independently reviewed from today: Sinus rhythm, rate 62    Heart cath 4/24/2023  1. Left main: short/near separate LAD/LCx ostium.  2. LAD: 30% ostial/angulated disease, patent mid-vessel stent.  3. LCx: 95% ostial stenosis, patent proximal-mid vessel stent complex.  4. RCA: 30% proximal ISR.  5. LVEDP 19mmHg, no aortic stenosis on LV-Ao gradient.  6. FFR of LAD = 0.91.  7. Successful PCI to severe ostial LCx stenosis (into LM ostium) with one [place] NERIS.        Echo 12/2021:  1. The left ventricular systolic function is normal with a 50-55% estimated ejection fraction.  2. Spectral Doppler shows an impaired relaxation pattern of left ventricular diastolic filling.  3. Aortic valve sclerosis.     [facility]: 10/30/2020  CONCLUSIONS:  1.  Successful PCI to severe proximal LCx stenosis with a 2.75 x 18 mm Resolute  NERIS post-dilated with a 2.75 mm NC balloon at 22 leonila.  2. Successful PCI to severe mid-LAD stenosis with a 2.5 x 34 mm Resolute NERIS  post-dilated distally with a 2.5 mm NC balloon at 22 leonila and proximally with a  2.75 mm NC balloon at 20-24 leonila    Assessment/Plan   Very pleasant 78 year old female with a history of severe multi-vessel CAD with PCI to LCx and LAD in 10/2020, PCI to Lcx in 4/23, carotid stenosis, DM, HTN, HLD and spinal stenosis.   Doing well from a cardiac standpoint, repeat blood pressure 124/78.    Plan   -continue Aspirin 81mg daily indefinitely, current Hydralazine, imdur, repatha, metoprolol and amlodipine   -follow up in one year or sooner if needed          Sonido Escalante MD

## 2024-05-23 LAB
ATRIAL RATE: 62 BPM
P AXIS: 55 DEGREES
P OFFSET: 191 MS
P ONSET: 155 MS
PR INTERVAL: 124 MS
Q ONSET: 217 MS
QRS COUNT: 11 BEATS
QRS DURATION: 90 MS
QT INTERVAL: 428 MS
QTC CALCULATION(BAZETT): 434 MS
QTC FREDERICIA: 432 MS
R AXIS: 26 DEGREES
T AXIS: 85 DEGREES
T OFFSET: 431 MS
VENTRICULAR RATE: 62 BPM

## 2024-05-24 ENCOUNTER — OFFICE VISIT (OUTPATIENT)
Dept: PRIMARY CARE | Facility: CLINIC | Age: 79
End: 2024-05-24
Payer: MEDICARE

## 2024-05-24 VITALS
BODY MASS INDEX: 21.65 KG/M2 | HEART RATE: 80 BPM | HEIGHT: 63 IN | TEMPERATURE: 97.6 F | DIASTOLIC BLOOD PRESSURE: 56 MMHG | OXYGEN SATURATION: 91 % | WEIGHT: 122.2 LBS | SYSTOLIC BLOOD PRESSURE: 130 MMHG

## 2024-05-24 DIAGNOSIS — J44.9 CHRONIC OBSTRUCTIVE PULMONARY DISEASE, UNSPECIFIED COPD TYPE (MULTI): ICD-10-CM

## 2024-05-24 DIAGNOSIS — N39.0 URINARY TRACT INFECTION WITHOUT HEMATURIA, SITE UNSPECIFIED: ICD-10-CM

## 2024-05-24 DIAGNOSIS — N30.00 ACUTE CYSTITIS WITHOUT HEMATURIA: ICD-10-CM

## 2024-05-24 DIAGNOSIS — I73.9 PAD (PERIPHERAL ARTERY DISEASE) (CMS-HCC): ICD-10-CM

## 2024-05-24 DIAGNOSIS — F33.1 MODERATE EPISODE OF RECURRENT MAJOR DEPRESSIVE DISORDER (MULTI): Primary | ICD-10-CM

## 2024-05-24 DIAGNOSIS — G95.89 OTHER SPECIFIED DISEASES OF SPINAL CORD (MULTI): ICD-10-CM

## 2024-05-24 DIAGNOSIS — I73.9 RIGHT LEG CLAUDICATION (CMS-HCC): ICD-10-CM

## 2024-05-24 DIAGNOSIS — E11.9 TYPE 2 DIABETES MELLITUS WITHOUT COMPLICATION, WITHOUT LONG-TERM CURRENT USE OF INSULIN (MULTI): ICD-10-CM

## 2024-05-24 DIAGNOSIS — F03.90 DEMENTIA WITHOUT BEHAVIORAL DISTURBANCE (MULTI): ICD-10-CM

## 2024-05-24 DIAGNOSIS — R91.1 LUNG NODULE SEEN ON IMAGING STUDY: ICD-10-CM

## 2024-05-24 DIAGNOSIS — I25.119 ATHEROSCLEROSIS OF NATIVE CORONARY ARTERY OF NATIVE HEART WITH ANGINA PECTORIS (CMS-HCC): ICD-10-CM

## 2024-05-24 PROBLEM — J69.0 ASPIRATION PNEUMONIA (MULTI): Status: RESOLVED | Noted: 2023-04-04 | Resolved: 2024-05-24

## 2024-05-24 PROBLEM — R20.0 NUMBNESS: Status: RESOLVED | Noted: 2023-04-04 | Resolved: 2024-05-24

## 2024-05-24 PROBLEM — L98.9 CHANGING SKIN LESION: Status: RESOLVED | Noted: 2023-04-04 | Resolved: 2024-05-24

## 2024-05-24 PROBLEM — R09.89 SENSATION, CHOKING: Status: RESOLVED | Noted: 2023-04-04 | Resolved: 2024-05-24

## 2024-05-24 PROBLEM — R53.83 OTHER FATIGUE: Status: RESOLVED | Noted: 2023-04-04 | Resolved: 2024-05-24

## 2024-05-24 PROBLEM — R79.89 ELEVATED TSH: Status: RESOLVED | Noted: 2023-04-04 | Resolved: 2024-05-24

## 2024-05-24 PROBLEM — Z87.09 PERSONAL HISTORY OF OTHER DISEASES OF THE RESPIRATORY SYSTEM: Status: RESOLVED | Noted: 2024-04-26 | Resolved: 2024-05-24

## 2024-05-24 PROBLEM — R07.9 CHEST PAIN: Status: RESOLVED | Noted: 2023-04-04 | Resolved: 2024-05-24

## 2024-05-24 PROBLEM — D64.9 ANEMIA: Status: RESOLVED | Noted: 2023-04-04 | Resolved: 2024-05-24

## 2024-05-24 PROBLEM — I74.5: Status: RESOLVED | Noted: 2023-04-04 | Resolved: 2024-05-24

## 2024-05-24 PROBLEM — T17.208A OROPHARYNGEAL ASPIRATION: Status: RESOLVED | Noted: 2023-04-04 | Resolved: 2024-05-24

## 2024-05-24 PROBLEM — M25.551 HIP PAIN, RIGHT: Status: RESOLVED | Noted: 2023-04-04 | Resolved: 2024-05-24

## 2024-05-24 PROBLEM — M54.50 LOW BACK PAIN: Status: RESOLVED | Noted: 2023-04-04 | Resolved: 2024-05-24

## 2024-05-24 PROBLEM — E66.3 OVERWEIGHT: Status: RESOLVED | Noted: 2023-04-04 | Resolved: 2024-05-24

## 2024-05-24 PROBLEM — M54.2 NECK PAIN: Status: RESOLVED | Noted: 2023-04-04 | Resolved: 2024-05-24

## 2024-05-24 PROBLEM — M25.552 LEFT HIP PAIN: Status: RESOLVED | Noted: 2023-04-04 | Resolved: 2024-05-24

## 2024-05-24 LAB
POC APPEARANCE, URINE: CLEAR
POC BILIRUBIN, URINE: ABNORMAL
POC BLOOD, URINE: NEGATIVE
POC COLOR, URINE: YELLOW
POC GLUCOSE, URINE: NEGATIVE MG/DL
POC HEMOGLOBIN A1C: 6.5 % (ref 4.2–6.5)
POC KETONES, URINE: NEGATIVE MG/DL
POC LEUKOCYTES, URINE: ABNORMAL
POC NITRITE,URINE: NEGATIVE
POC PH, URINE: 5 PH
POC PROTEIN, URINE: NEGATIVE MG/DL
POC SPECIFIC GRAVITY, URINE: >=1.03
POC UROBILINOGEN, URINE: 0.2 EU/DL

## 2024-05-24 PROCEDURE — 1159F MED LIST DOCD IN RCRD: CPT | Performed by: PHYSICIAN ASSISTANT

## 2024-05-24 PROCEDURE — 3075F SYST BP GE 130 - 139MM HG: CPT | Performed by: PHYSICIAN ASSISTANT

## 2024-05-24 PROCEDURE — 99214 OFFICE O/P EST MOD 30 MIN: CPT | Performed by: PHYSICIAN ASSISTANT

## 2024-05-24 PROCEDURE — 87086 URINE CULTURE/COLONY COUNT: CPT

## 2024-05-24 PROCEDURE — 3078F DIAST BP <80 MM HG: CPT | Performed by: PHYSICIAN ASSISTANT

## 2024-05-24 PROCEDURE — 1160F RVW MEDS BY RX/DR IN RCRD: CPT | Performed by: PHYSICIAN ASSISTANT

## 2024-05-24 PROCEDURE — 83036 HEMOGLOBIN GLYCOSYLATED A1C: CPT | Performed by: PHYSICIAN ASSISTANT

## 2024-05-24 PROCEDURE — 81003 URINALYSIS AUTO W/O SCOPE: CPT | Performed by: PHYSICIAN ASSISTANT

## 2024-05-24 RX ORDER — CEPHALEXIN 500 MG/1
500 CAPSULE ORAL 2 TIMES DAILY
Qty: 14 CAPSULE | Refills: 0 | Status: SHIPPED | OUTPATIENT
Start: 2024-05-24 | End: 2024-05-31

## 2024-05-24 RX ORDER — CITALOPRAM 10 MG/1
10 TABLET ORAL DAILY
Qty: 30 TABLET | Refills: 2 | Status: SHIPPED | OUTPATIENT
Start: 2024-05-24

## 2024-05-24 ASSESSMENT — PATIENT HEALTH QUESTIONNAIRE - PHQ9
1. LITTLE INTEREST OR PLEASURE IN DOING THINGS: NOT AT ALL
2. FEELING DOWN, DEPRESSED OR HOPELESS: NOT AT ALL
SUM OF ALL RESPONSES TO PHQ9 QUESTIONS 1 AND 2: 0

## 2024-05-24 NOTE — PROGRESS NOTES
Subjective     HPI   Janinecar Hendrix is a 78 y.o. year old female patient with presenting to clinic with concern for   Chief Complaint   Patient presents with    Follow-up       Dr Everett Nelson neuro- request records. Concern for vascular changes.   Advised not allowed to drive anymore.  Put on donazepil 5 mg 1 month ago, has had mood changes and frustration and angry and depressed. Recommended they call and see about changing or stopping this medication.    Feeling more depressed recently. Daughter notes that it could be related to being told she can't drive (though she hasn't driven in moths anyway) or loss of independence, or related to the medication. Pt is frustrated with confusion and denies episodes of confusion issues or dementia. States that the house is busy and confusing.     Previously increased wellbutrin and she had crying spells and issues. Better when reduced to 150mg. Hasn't done well with zoloft. Recommend celexgiuliano    Sees Dr. Escalante- Last visit 5/16/24   Very pleasant 78 year old female with a history of severe multi-vessel CAD with PCI to LCx and LAD in 10/2020, PCI to Lcx in 4/23, carotid stenosis, DM, HTN, HLD and spinal stenosis.   Doing well from a cardiac standpoint, repeat blood pressure 124/78.     Plan   -continue Aspirin 81mg daily indefinitely, current Hydralazine, imdur, repatha, metoprolol and amlodipine         Dr Rodriguez  Lumbar laminectomy syndrome    X-rays demonstrate stable L3-S1 laminectomy and fusion.  No significant adjacent segment changes.    chronic low back pain secondary to prior multilevel lumbar laminectomy and fusion.  I recommended physical therapy and core strengthening.        Patient Active Problem List   Diagnosis    Abnormal skin growth    Anxiety with depression    Bilateral carotid artery stenosis    BPPV (benign paroxysmal positional vertigo)    Chronic fatigue and malaise    Chronic low back pain    Chronic right hip pain    Coronary artery disease  involving native coronary artery of native heart without angina pectoris    Dementia without behavioral disturbance (Multi)    DM2 (diabetes mellitus, type 2) (Multi)    Dyslipidemia    Dysphagia, cricopharyngeal    Essential hypertension    GERD (gastroesophageal reflux disease)    History of heart artery stent    History of total mastectomy of left breast    Hypokalemia    Hypomagnesemia    Injury of right common femoral artery    Intraoperative complication involving circulatory system associated with cardiac catheterization    Oropharyngeal dysphagia    PAD (peripheral artery disease) (CMS-[facility])    Pain of left upper extremity    Pain of right upper extremity    Panic attacks    Pernicious anemia    Right leg claudication (CMS-[facility])    SCC (squamous cell carcinoma)    Type 2 diabetes mellitus without complication, without long-term current use of insulin (Multi)    Vitamin D deficiency    Zenker's diverticulum    Allergic rhinitis    Insomnia    Other specified diseases of spinal cord (Multi)       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      Past Surgical History:   Procedure Laterality Date    CT ABDOMEN PELVIS ANGIOGRAM W AND/OR WO IV CONTRAST  10/28/2020    CT ABDOMEN PELVIS ANGIOGRAM W AND/OR WO IV CONTRAST 10/28/2020 [facility]    MASTECTOMY Left 1999    malignant    OTHER SURGICAL HISTORY  11/18/2020    Vascular surgical procedure    OTHER SURGICAL HISTORY  11/18/2020    Cardiac catheterization      Family History   Problem  "Relation Name Age of Onset    Breast cancer Mother      No Known Problems Father      No Known Problems Other        Social History     Tobacco Use    Smoking status: Former     Current packs/day: 0.00     Average packs/day: 0.5 packs/day for 5.0 years (2.5 ttl pk-yrs)     Types: Cigarettes     Start date:      Quit date:      Years since quittin.4    Smokeless tobacco: Never   Substance Use Topics    Alcohol use: Yes     Comment: rare        Current Outpatient Medications:     albuterol 90 mcg/actuation inhaler, INHALE 2 INHALATIONS BY MOUTH  EVERY 6 HOURS IF NEEDED FOR  SHORTNESS OF BREATH, Disp: 17 g, Rfl: 2    amLODIPine (Norvasc) 5 mg tablet, TAKE 1 TABLET BY MOUTH DAILY, Disp: 90 tablet, Rfl: 3    aspirin 81 mg EC tablet, Take 1 tablet (81 mg) by mouth once daily., Disp: , Rfl:     BD Luer-Akilah Syringe 3 mL 22 x 1 1/2\" syringe, 1 every 2 weeks, Disp: , Rfl:     blood sugar diagnostic (True Metrix Glucose Test Strip) strip, 1 strip 3 times a day., Disp: 300 strip, Rfl: 3    blood-glucose meter (True Metrix Glucose Meter) misc, , Disp: , Rfl:     buPROPion XL (Wellbutrin XL) 150 mg 24 hr tablet, Take 1 tablet (150 mg) by mouth once daily in the morning. Do not crush, chew, or split., Disp: 90 tablet, Rfl: 1    clopidogrel (Plavix) 75 mg tablet, , Disp: , Rfl:     evolocumab (Repatha SureClick) 140 mg/mL injection, Inject 1 mL (140 mg) under the skin every 14 (fourteen) days. Do not start before 2024., Disp: 6 mL, Rfl: 3    evolocumab (Repatha SureClick) 140 mg/mL injection, Inject 1 mL (140 mg) under the skin every 14 (fourteen) days., Disp: 2 each, Rfl: 0    famotidine (Pepcid) 20 mg tablet, Take 1 tablet (20 mg) by mouth 2 times a day., Disp: 180 tablet, Rfl: 3    FreeStyle Test strip, Test twice daily as directed, Disp: 100 strip, Rfl: 1    hydrALAZINE (Apresoline) 50 mg tablet, TAKE 1 TABLET BY MOUTH TWICE  DAILY, Disp: 180 tablet, Rfl: 3    ipratropium-albuteroL (Duo-Neb) 0.5-2.5 mg/3 " "mL nebulizer solution, Take 3 mL by nebulization 4 times a day as needed for wheezing or shortness of breath., Disp: 90 mL, Rfl: 3    isosorbide mononitrate ER (Imdur) 30 mg 24 hr tablet, Take 1 tablet (30 mg) by mouth once daily., Disp: 90 tablet, Rfl: 3    metFORMIN  mg 24 hr tablet, Take 1 tablet (500 mg) by mouth 2 times a day with meals., Disp: 180 tablet, Rfl: 1    metoprolol succinate XL (Toprol-XL) 100 mg 24 hr tablet, TAKE 1 TABLET BY MOUTH DAILY, Disp: 90 tablet, Rfl: 3    nitroglycerin (Nitrostat) 0.4 mg SL tablet, Place 1 tablet (0.4 mg) under the tongue every 5 minutes if needed for chest pain., Disp: 30 tablet, Rfl: 0    pantoprazole (ProtoNix) 40 mg EC tablet, TAKE TAKE 1 TABLET BY MOUTH  TWICE DAILY FOR 60 DAYS, THEN 1  TABLET ONCE DAILY IN THE MORNING BEFORE MEALS THEREAFTER DO NOT  CRUSH, CHEW, OR SPLIT, Disp: 150 tablet, Rfl: 3    sodium chloride (Heron 128) 5 % ophthalmic solution, 2 drops., Disp: , Rfl:     syringe, disposable, (Carepoint Luer Slip Syringe) 1 mL syringe, , Disp: , Rfl:     cephalexin (Keflex) 500 mg capsule, Take 1 capsule (500 mg) by mouth 2 times a day for 7 days., Disp: 14 capsule, Rfl: 0    citalopram (CeleXA) 10 mg tablet, Take 1 tablet (10 mg) by mouth once daily., Disp: 30 tablet, Rfl: 2     Review of Systems  Constitutional: Denies fever  HEENT: Denies ST, earache  CVS: Denies Chest pain  Pulmonary: Denies wheezing, SOB  GI: Denies N/V  : Denies dysuria  Musculoskeletal:  Denies myalgia  Neuro: Denies focal weakness or numbness.  Skin: Denies Rashes.  *Review of Systems is negative unless otherwise mentioned in HPI or ROS above.    Objective   /56   Pulse 80   Temp 36.4 °C (97.6 °F)   Ht 1.6 m (5' 3\")   Wt 55.4 kg (122 lb 3.2 oz)   SpO2 91%   BMI 21.65 kg/m²  reviewed Body mass index is 21.65 kg/m².     Physical Exam  Constitutional: NAD.  Resting comfortably.  Head: Atraumatic, normocephalic.  ENT: Moist oral mucosa. Nasal mucosa wnl.   Cardiac: " Regular rate & rhythm.   Pulmonary: Lungs clear bilat  GI: Soft, Nontender, nondistended.   Musculoskeletal: No peripheral edema.   Skin: No evidence of trauma. No rashes  Psych: Intact judgement and insight.  Irritable     .Assessment/Plan   Problem List Items Addressed This Visit             ICD-10-CM    Dementia without behavioral disturbance (Multi) F03.90     Dr Everett Nelson neuro  Concern for vascular changes.   Advised not allowed to drive anymore.     Pt is frustrated with confusion and denies episodes of confusion issues or dementia.  Doing poorly on donazepil.          PAD (peripheral artery disease) (CMS-[facility]) I73.9     ASA 81mg daily         Right leg claudication (CMS-HCC) I73.9     ASA 81mg daily, repatha         Type 2 diabetes mellitus without complication, without long-term current use of insulin (Multi) E11.9     A1c well controlled         Relevant Orders    POCT glycosylated hemoglobin (Hb A1C) manually resulted (Completed)    Other specified diseases of spinal cord ([facility]) G95.89       Dr Rodriguez  Lumbar laminectomy syndrome    X-rays demonstrate stable L3-S1 laminectomy and fusion.  No significant adjacent segment changes.    chronic low back pain secondary to prior multilevel lumbar laminectomy and fusion.  I recommended physical therapy and core strengthening.         RESOLVED: Chronic obstructive pulmonary disease, unspecified COPD type (Multi) J44.9     Other Visit Diagnoses         Codes    Moderate episode of recurrent major depressive disorder (Multi)    -  Primary F33.1    Relevant Medications    citalopram (CeleXA) 10 mg tablet    Lung nodule seen on imaging study     R91.1    Relevant Orders    Referral to Pulmonology    Acute cystitis without hematuria     N30.00    Urinary tract infection without hematuria, site unspecified     N39.0    Relevant Medications    cephalexin (Keflex) 500 mg capsule    Other Relevant Orders    POCT UA Automated manually resulted (Completed)    Urine  Culture (Completed)    Atherosclerosis of native coronary artery of native heart with angina pectoris (CMS-HCC)     I25.119

## 2024-05-25 LAB — BACTERIA UR CULT: NORMAL

## 2024-05-28 PROBLEM — G95.89 OTHER SPECIFIED DISEASES OF SPINAL CORD (MULTI): Status: ACTIVE | Noted: 2024-05-28

## 2024-05-28 PROBLEM — J44.9 CHRONIC OBSTRUCTIVE PULMONARY DISEASE, UNSPECIFIED COPD TYPE (MULTI): Status: RESOLVED | Noted: 2024-05-28 | Resolved: 2024-05-28

## 2024-05-28 PROBLEM — J44.9 CHRONIC OBSTRUCTIVE PULMONARY DISEASE, UNSPECIFIED COPD TYPE (MULTI): Status: ACTIVE | Noted: 2024-05-28

## 2024-05-28 NOTE — ASSESSMENT & PLAN NOTE
Dr Everett Nelson neuro  Concern for vascular changes.   Advised not allowed to drive anymore.     Pt is frustrated with confusion and denies episodes of confusion issues or dementia.  Doing poorly on donazepil.

## 2024-05-28 NOTE — ASSESSMENT & PLAN NOTE
Dr Rodriguez  Lumbar laminectomy syndrome    X-rays demonstrate stable L3-S1 laminectomy and fusion.  No significant adjacent segment changes.    chronic low back pain secondary to prior multilevel lumbar laminectomy and fusion.  I recommended physical therapy and core strengthening.

## 2024-05-30 DIAGNOSIS — I25.10 CORONARY ARTERY DISEASE INVOLVING NATIVE CORONARY ARTERY OF NATIVE HEART WITHOUT ANGINA PECTORIS: Primary | ICD-10-CM

## 2024-05-31 RX ORDER — CLOPIDOGREL BISULFATE 75 MG/1
75 TABLET ORAL DAILY
Qty: 90 TABLET | Refills: 3 | Status: SHIPPED | OUTPATIENT
Start: 2024-05-31 | End: 2025-05-31

## 2024-06-07 ENCOUNTER — APPOINTMENT (OUTPATIENT)
Dept: VASCULAR MEDICINE | Facility: HOSPITAL | Age: 79
End: 2024-06-07
Payer: MEDICARE

## 2024-06-07 ENCOUNTER — APPOINTMENT (OUTPATIENT)
Dept: CARDIOLOGY | Facility: HOSPITAL | Age: 79
End: 2024-06-07
Payer: MEDICARE

## 2024-06-12 ENCOUNTER — OFFICE VISIT (OUTPATIENT)
Dept: CARDIOLOGY | Facility: HOSPITAL | Age: 79
End: 2024-06-12
Payer: MEDICARE

## 2024-06-12 ENCOUNTER — HOSPITAL ENCOUNTER (OUTPATIENT)
Dept: VASCULAR MEDICINE | Facility: HOSPITAL | Age: 79
Discharge: HOME | End: 2024-06-12
Payer: MEDICARE

## 2024-06-12 VITALS
BODY MASS INDEX: 21.67 KG/M2 | OXYGEN SATURATION: 95 % | HEART RATE: 58 BPM | WEIGHT: 122.36 LBS | SYSTOLIC BLOOD PRESSURE: 151 MMHG | DIASTOLIC BLOOD PRESSURE: 77 MMHG

## 2024-06-12 DIAGNOSIS — I65.21 STENOSIS OF RIGHT CAROTID ARTERY: ICD-10-CM

## 2024-06-12 DIAGNOSIS — I73.9 PAD (PERIPHERAL ARTERY DISEASE) (CMS-HCC): Primary | ICD-10-CM

## 2024-06-12 PROCEDURE — 99213 OFFICE O/P EST LOW 20 MIN: CPT | Performed by: INTERNAL MEDICINE

## 2024-06-12 PROCEDURE — 93880 EXTRACRANIAL BILAT STUDY: CPT | Performed by: SURGERY

## 2024-06-12 PROCEDURE — 1159F MED LIST DOCD IN RCRD: CPT | Performed by: INTERNAL MEDICINE

## 2024-06-12 PROCEDURE — 93880 EXTRACRANIAL BILAT STUDY: CPT

## 2024-06-12 PROCEDURE — 3077F SYST BP >= 140 MM HG: CPT | Performed by: INTERNAL MEDICINE

## 2024-06-12 PROCEDURE — 3078F DIAST BP <80 MM HG: CPT | Performed by: INTERNAL MEDICINE

## 2024-06-25 NOTE — PROGRESS NOTES
Primary Care Physician: Jessica Burnette PA-C   Date of Visit: 06/12/2024 10:15 AM EDT  Type of Visit: Follow-up visit    Chief Complaint:  No chief complaint on file.       HERMINIO Hendrix 78 y.o. female who presents for follow-up.    She is concerned because she has worsening pain in her right leg.  It is not entirely clear; the symptoms do not sound completely exertional.  She denies any rest pain or nonhealing wounds.    Review of Systems   12 point review of systems was obtained in detail and is negative other than that noted above or below.     Past Medical/Surgical History:  Janine has a past medical history of Aspiration pneumonia (Multi) (04/04/2023), DM2 (diabetes mellitus, type 2) (Multi), Dyslipidemia, Encounter for other preprocedural examination (10/19/2021), GERD (gastroesophageal reflux disease), HTN (hypertension), benign, Pain in left hip (06/18/2020), Personal history of other diseases of the respiratory system (11/30/2020), and Thrombosis of external iliac artery (Multi) (04/04/2023).    Janine has a past surgical history that includes Other surgical history (11/18/2020); Other surgical history (11/18/2020); CT angio abdomen pelvis w and or wo IV IV contrast (10/28/2020); and Mastectomy (Left, 1999).    Social/Family History:  Janine reports that she quit smoking about 5 years ago. Her smoking use included cigarettes. She started smoking about 10 years ago. She has a 2.5 pack-year smoking history. She has never used smokeless tobacco. She reports current alcohol use. She reports that she does not use drugs.    Janine's family history includes Breast cancer in her mother; No Known Problems in her father and another family member.    Allergies:  Allergies   Allergen Reactions    Adhesive Tape-Silicones Rash    Bloodstop Bandage Rash       Medications:  Current Outpatient Medications on File Prior to Visit   Medication Sig    albuterol 90 mcg/actuation inhaler INHALE 2 INHALATIONS BY MOUTH   "EVERY 6 HOURS IF NEEDED FOR  SHORTNESS OF BREATH    amLODIPine (Norvasc) 5 mg tablet TAKE 1 TABLET BY MOUTH DAILY    aspirin 81 mg EC tablet Take 1 tablet (81 mg) by mouth once daily.    BD Luer-Akilah Syringe 3 mL 22 x 1 1/2\" syringe 1 every 2 weeks    blood sugar diagnostic (True Metrix Glucose Test Strip) strip 1 strip 3 times a day.    blood-glucose meter (True Metrix Glucose Meter) misc     buPROPion XL (Wellbutrin XL) 150 mg 24 hr tablet Take 1 tablet (150 mg) by mouth once daily in the morning. Do not crush, chew, or split.    citalopram (CeleXA) 10 mg tablet Take 1 tablet (10 mg) by mouth once daily.    clopidogrel (Plavix) 75 mg tablet Take 1 tablet (75 mg) by mouth once daily.    evolocumab (Repatha SureClick) 140 mg/mL injection Inject 1 mL (140 mg) under the skin every 14 (fourteen) days. Do not start before March 22, 2024.    evolocumab (Repatha SureClick) 140 mg/mL injection Inject 1 mL (140 mg) under the skin every 14 (fourteen) days.    famotidine (Pepcid) 20 mg tablet Take 1 tablet (20 mg) by mouth 2 times a day.    FreeStyle Test strip Test twice daily as directed    hydrALAZINE (Apresoline) 50 mg tablet TAKE 1 TABLET BY MOUTH TWICE  DAILY    ipratropium-albuteroL (Duo-Neb) 0.5-2.5 mg/3 mL nebulizer solution Take 3 mL by nebulization 4 times a day as needed for wheezing or shortness of breath.    isosorbide mononitrate ER (Imdur) 30 mg 24 hr tablet Take 1 tablet (30 mg) by mouth once daily.    metFORMIN  mg 24 hr tablet Take 1 tablet (500 mg) by mouth 2 times a day with meals.    metoprolol succinate XL (Toprol-XL) 100 mg 24 hr tablet TAKE 1 TABLET BY MOUTH DAILY    nitroglycerin (Nitrostat) 0.4 mg SL tablet Place 1 tablet (0.4 mg) under the tongue every 5 minutes if needed for chest pain.    pantoprazole (ProtoNix) 40 mg EC tablet TAKE TAKE 1 TABLET BY MOUTH  TWICE DAILY FOR 60 DAYS, THEN 1  TABLET ONCE DAILY IN THE MORNING BEFORE MEALS THEREAFTER DO NOT  CRUSH, CHEW, OR SPLIT    sodium " chloride (Heron 128) 5 % ophthalmic solution 2 drops.    syringe, disposable, (Carepoint Luer Slip Syringe) 1 mL syringe      No current facility-administered medications on file prior to visit.       Objective:   Vitals:    06/12/24 1001   BP: 151/77   Pulse: 58   SpO2: 95%       S1-S2 normal, regular rate and rhythm, no murmurs, rubs, gallops  Clear to auscultation bilaterally    Labs and Imaging:      Latest Ref Rng & Units 5/6/2022     5:42 PM 9/26/2022     9:32 AM 12/19/2022     4:20 PM 3/20/2023     9:24 AM 4/21/2023     9:33 AM 11/4/2023     8:06 AM 3/13/2024     8:37 AM   Electrolytes   Na 136 - 145 mmol/L 136  139  141  138  137  144  141    K 3.5 - 5.3 mmol/L 4.3  4.2  4.1  4.2  4.5  4.5  4.6    Cl 98 - 107 mmol/L 101  104  104  100  98  103  102    CO2 21 - 32 mmol/L 28  26  27  27  27  26  28    Cr 0.50 - 1.05 mg/dL 0.79  0.77  0.67  0.95  0.74  0.83  0.90    Ca 8.6 - 10.3 mg/dL 9.3  9.0  9.4  9.1  9.4  9.4  9.3          Latest Ref Rng & Units 5/6/2022     5:42 PM 9/26/2022     9:32 AM 12/19/2022     4:20 PM 3/20/2023     9:24 AM 4/21/2023     9:33 AM 11/4/2023     8:06 AM 3/13/2024     8:37 AM   CBC   Hb 12.0 - 16.0 g/dL 12.4  13.8  13.4  13.9  13.8  13.3  13.0    Hct 36.0 - 46.0 % 37.0  42.4  40.1  41.9  42.7  40.2  39.6    MCV 80 - 100 fL 88  91  89  90  90  92  91    RDW 11.5 - 14.5 % 13.7  12.9  13.2  12.7  13.0  13.7  13.2          Latest Ref Rng & Units 5/3/2022     9:11 AM 5/6/2022     5:42 PM 9/26/2022     9:32 AM 12/19/2022     4:20 PM 3/20/2023     9:24 AM 11/4/2023     8:06 AM 3/13/2024     8:37 AM   Lipids   Chol 0 - 199 mg/dL 123   139   149   141    HDL mg/dL 67.0   61.0   68.4   63.7    LDL <=99 mg/dL 25   40   48   53    Trig 0 - 149 mg/dL 157   192   164   122    ALT 7 - 45 U/L 17  13  16  12  17  13  13    AST 9 - 39 U/L 18  13  19  16  21  16  16          Latest Ref Rng & Units 10/25/2021     9:16 AM 12/20/2021     9:03 AM 5/3/2022     9:11 AM 9/26/2022     9:32 AM 3/20/2023     9:24  AM 3/13/2024     8:37 AM 4/18/2024     2:04 PM   Thyroid   T4 Free 0.61 - 1.12 ng/dL      0.96     TSH 0.44 - 3.98 mIU/L 2.93  2.28  1.82  2.05  2.28  4.03  2.29           No data to display                 Lab Results   Component Value Date    HGBA1C 6.5 05/24/2024    HGBA1C 6.8 (A) 09/27/2023    HGBA1C 7.6 (A) 03/20/2023        The ASCVD Risk score (Anum COHN, et al., 2019) failed to calculate for the following reasons:    The patient has a prior MI or stroke diagnosis     Echocardiogram: No results found for this or any previous visit.     Echocardiogram:   ECHOCARDIOGRAM     Narrative  Ordered by an unspecified provider.    Stress Testing: No results found for this or any previous visit from the past 1825 days.    Cardiac Catheterization:   Coronary Angiography:  The coronary circulation is right dominant.    Left Main Coronary Artery:  The left main coronary artery is a short caliber vessel.    Left Anterior Descending Coronary Artery Distribution:  The LAD demonstrated a previous patent stent. The ostial left anterior descending coronary artery showed 30% stenosis.    Circumflex Coronary Artery Distribution:  The circumflex revealed multiple previous patent stents. The ostial circumflex coronary artery showed 95% stenosis.    Right Coronary Artery Distribution:    The proximal right coronary artery showed 30% in-stent restenosis.    Coronary Interventions:  Pre-intervention TOMMIE flow was 3. Post-intervention TOMMIE flow was 3.    Coronary Intervention Comments:  A 6Fr JL3.5 guide was used to cannulate the left main and heparin was used for adjunctive anticoagulaton. A pressure wire was placed in the distal LAD for FFR to ensure no LAD comprimise--IC NTG and IV adenosine were given. After 2 minutes of adenosine at 140mcg/kg/min, FFR was 0.91. The pressure wire was removed and Terumo runthrough wires were placed in the LAD and LCx. The ostial LCx lesion was predilated with a 3.0mm compliant balloon and a 3.0 x  15mm [place] NERIS was delivered and deployed to the left main ostium (very, short LM) at 12atm and post-dilated with a 3.0mm NC balloon at 24atm. Final angiographic result shows TOMMIE 3 Flow and 0% residual stenosis.    Coronary Lesion Summary:  Vessel            Stenosis         Vessel Segment  LAD             30% stenosis           ostial  Circumflex      95% stenosis           ostial  RCA        30% in-stent restenosis    proximal    Recommendations:  Aspirin 81mg daily indefinitely and P2Y12 inhibition for at least one year,  preferably extended with stent burden.  Optimize CAD medical therapy.  Consider cardiac rehab.    ____________________________________________________________________________________  CONCLUSIONS:  1. Left main: short/near separate LAD/LCx ostium.  2. LAD: 30% ostial/angulated disease, patent mid-vessel stent.  3. LCx: 95% ostial stenosis, patent proximal-mid vessel stent complex.  4. RCA: 30% proximal ISR.  5. LVEDP 19mmHg, no aortic stenosis on LV-Ao gradient.  6. FFR of LAD = 0.91.  7. Successful PCI to severe ostial LCx stenosis (into LM ostium) with one [place] NERIS.    ____________________________________________________________________________________    Cardiac Scoring: No results found for this or any previous visit from the past 1825 days.    AAA : No results found for this or any previous visit from the past 1825 days.    OTHER: No results found for this or any previous visit from the past 1825 days.        Assessment/Plan:   No diagnosis found.       Janine Hendrix 78 y.o. female who presents for follow-up:    1.  Prior right CFA hemorrhage status post covered stent (right CFA: 7 mm x 37 mm Bard Lifestream covered stent, right external iliac artery 7 mm x 40 mm Bard life stent)  2.  Peripheral arterial disease    The findings of her recent vascular testing.  Return to clinic in 1 year with MARVIN TBI and arterial duplex.    Continue aspirin 81 mg daily and clopidogrel 75  mg daily.        ____________________________________________________________  Quique Shaun, MD

## 2024-07-10 ENCOUNTER — TELEPHONE (OUTPATIENT)
Dept: PRIMARY CARE | Facility: CLINIC | Age: 79
End: 2024-07-10

## 2024-07-10 ENCOUNTER — APPOINTMENT (OUTPATIENT)
Dept: PULMONOLOGY | Facility: CLINIC | Age: 79
End: 2024-07-10
Payer: MEDICARE

## 2024-07-10 VITALS
BODY MASS INDEX: 21.68 KG/M2 | WEIGHT: 122.4 LBS | HEART RATE: 66 BPM | OXYGEN SATURATION: 95 % | SYSTOLIC BLOOD PRESSURE: 124 MMHG | DIASTOLIC BLOOD PRESSURE: 67 MMHG

## 2024-07-10 DIAGNOSIS — I73.9 RIGHT LEG CLAUDICATION (CMS-HCC): ICD-10-CM

## 2024-07-10 DIAGNOSIS — G89.29 CHRONIC BILATERAL LOW BACK PAIN WITHOUT SCIATICA: ICD-10-CM

## 2024-07-10 DIAGNOSIS — I73.9 PAD (PERIPHERAL ARTERY DISEASE) (CMS-HCC): Primary | ICD-10-CM

## 2024-07-10 DIAGNOSIS — J40 BRONCHITIS: ICD-10-CM

## 2024-07-10 DIAGNOSIS — M54.50 CHRONIC BILATERAL LOW BACK PAIN WITHOUT SCIATICA: ICD-10-CM

## 2024-07-10 DIAGNOSIS — R91.1 LUNG NODULE: Primary | ICD-10-CM

## 2024-07-10 PROCEDURE — 1160F RVW MEDS BY RX/DR IN RCRD: CPT | Performed by: PEDIATRICS

## 2024-07-10 PROCEDURE — 3078F DIAST BP <80 MM HG: CPT | Performed by: PEDIATRICS

## 2024-07-10 PROCEDURE — 1159F MED LIST DOCD IN RCRD: CPT | Performed by: PEDIATRICS

## 2024-07-10 PROCEDURE — 1036F TOBACCO NON-USER: CPT | Performed by: PEDIATRICS

## 2024-07-10 PROCEDURE — 3074F SYST BP LT 130 MM HG: CPT | Performed by: PEDIATRICS

## 2024-07-10 PROCEDURE — 99205 OFFICE O/P NEW HI 60 MIN: CPT | Performed by: PEDIATRICS

## 2024-07-10 RX ORDER — PREDNISONE 20 MG/1
40 TABLET ORAL DAILY
Qty: 10 TABLET | Refills: 0 | Status: SHIPPED | OUTPATIENT
Start: 2024-07-10 | End: 2024-07-15

## 2024-07-10 RX ORDER — AZITHROMYCIN 250 MG/1
TABLET, FILM COATED ORAL
Qty: 6 TABLET | Refills: 0 | Status: SHIPPED | OUTPATIENT
Start: 2024-07-10 | End: 2024-07-15

## 2024-07-10 NOTE — PROGRESS NOTES
"Subjective   Patient ID: Janine Hendrix \"Neeta\" is a 78 y.o. female who presents for lung nodule    HPI    Ms Hendrix is a 78yr old  with no significant past pulmonary issues that had a chest CT in February.  This shows a new 14-15 ground glass area in the RML that was new compared to prior CT in 2021.  It has the appearance of inflammation but per radiology malignancy cannot be ruled out.    She does have some allergic rhinitis and phlegm that is bothersome.    She is a former smoker, 1/2 - 1ppd starting at age 16, she quit for the most part in 1997 but for several more years would on occasion smoke she completely quit at around age 71.    Review of Systems    See scanned documents attached to this note for review of systems, and appropriate scales/scores for this visit.     Objective   Physical Exam  Constitutional:       Appearance: Normal appearance.   HENT:      Head: Normocephalic and atraumatic.      Mouth/Throat:      Pharynx: Oropharynx is clear.   Cardiovascular:      Rate and Rhythm: Normal rate and regular rhythm.      Pulses: Normal pulses.      Heart sounds: Normal heart sounds.   Pulmonary:      Effort: Pulmonary effort is normal.      Breath sounds: Normal breath sounds. No wheezing, rhonchi or rales.   Abdominal:      General: Bowel sounds are normal.      Palpations: Abdomen is soft.   Musculoskeletal:         General: Normal range of motion.   Skin:     General: Skin is warm and dry.   Neurological:      General: No focal deficit present.      Mental Status: She is alert and oriented to person, place, and time.   Psychiatric:         Mood and Affect: Mood normal.         Assessment/Plan     78yr old with new GGO lung nodule    New lung nodule:  likely inflammatory but cannot rule out malignancy in high risk patient  - will get repeat CT with prednisone and azithromycin 1 week prior to the CT    2. Tobacco use:  no symptoms  - no treatment at this point.  Would consider PFT and starting  LAMA/LABA " if symptoms increase    Follow up after CT      Michael Joy MD 07/10/24 8:26 AM

## 2024-07-10 NOTE — LETTER
"July 10, 2024     Jessica Burnette PA-C  [address] [place] [address]    Patient: Neeta Hendrix   YOB: 1945   Date of Visit: 7/10/2024       Dear Dr. Jessica Burnette PA-C:    Thank you for referring Neeta Hendrix to me for evaluation. Below are my notes for this consultation.  If you have questions, please do not hesitate to call me. I look forward to following your patient along with you.       Sincerely,     Michael Joy MD      CC: No Recipients  ______________________________________________________________________________________    Subjective  Patient ID: Janine Hendrix \"Lynette" is a 78 y.o. female who presents for lung nodule    HPI    Ms Hendrix is a 78yr old  with no significant past pulmonary issues that had a chest CT in February.  This shows a new 14-15 ground glass area in the RML that was new compared to prior CT in 2021.  It has the appearance of inflammation but per radiology malignancy cannot be ruled out.    She does have some allergic rhinitis and phlegm that is bothersome.    She is a former smoker, 1/2 - 1ppd starting at age 16, she quit for the most part in 1997 but for several more years would on occasion smoke she completely quit at around age 71.    Review of Systems    See scanned documents attached to this note for review of systems, and appropriate scales/scores for this visit.     Objective  Physical Exam  Constitutional:       Appearance: Normal appearance.   HENT:      Head: Normocephalic and atraumatic.      Mouth/Throat:      Pharynx: Oropharynx is clear.   Cardiovascular:      Rate and Rhythm: Normal rate and regular rhythm.      Pulses: Normal pulses.      Heart sounds: Normal heart sounds.   Pulmonary:      Effort: Pulmonary effort is normal.      Breath sounds: Normal breath sounds. No wheezing, rhonchi or rales.   Abdominal:      General: Bowel sounds are normal.      Palpations: Abdomen is soft.   Musculoskeletal:         General: Normal range " of motion.   Skin:     General: Skin is warm and dry.   Neurological:      General: No focal deficit present.      Mental Status: She is alert and oriented to person, place, and time.   Psychiatric:         Mood and Affect: Mood normal.         Assessment/Plan    78yr old with new GGO lung nodule    New lung nodule:  likely inflammatory but cannot rule out malignancy in high risk patient  - will get repeat CT with prednisone and azithromycin 1 week prior to the CT    2. Tobacco use:  no symptoms  - no treatment at this point.  Would consider PFT and starting  LAMA/LABA if symptoms increase    Follow up after CT      Michael Joy MD 07/10/24 8:26 AM

## 2024-07-24 ENCOUNTER — APPOINTMENT (OUTPATIENT)
Dept: RADIOLOGY | Facility: HOSPITAL | Age: 79
End: 2024-07-24
Payer: MEDICARE

## 2024-07-31 ENCOUNTER — HOSPITAL ENCOUNTER (OUTPATIENT)
Dept: RADIOLOGY | Facility: HOSPITAL | Age: 79
Discharge: HOME | End: 2024-07-31
Payer: MEDICARE

## 2024-07-31 DIAGNOSIS — R91.1 LUNG NODULE: ICD-10-CM

## 2024-07-31 PROCEDURE — 71250 CT THORAX DX C-: CPT | Performed by: STUDENT IN AN ORGANIZED HEALTH CARE EDUCATION/TRAINING PROGRAM

## 2024-07-31 PROCEDURE — 71250 CT THORAX DX C-: CPT

## 2024-08-02 NOTE — PROGRESS NOTES
HPI:Janine Hendrix is a 78-year-old woman with past medical history significant for prior L3-S1 laminectomy fusion with Dr. Irene Jenkins several years ago.  She comes in today with continued low back pain.  Patient states that she is walking little better than she was prior to her surgery.  She has no significant leg pain.  She has activity related low back pain.  She has not done recent physical therapy.      ROS:  Reviewed on EMR and patient intake sheet.    PMH/SH:  Reviewed on EMR and patient intake sheet.    Exam:  Physical Exam    Constitutional: Well appearing; no acute distress  Eyes: pupils are equal and round  Psych: normal affect  Respiratory: non-labored breathing  Cardiovascular: regular rate and rhythm  GI: non-distended abdomen  Musculoskeletal: no pain with range of motion of the hips bilaterally  Neurologic: [4]/5 strength in the lower extremities bilaterally]; [negative] straight leg raise    Radiology:     X-rays demonstrate stable L3-S1 laminectomy and fusion.  No significant adjacent segment changes.    Diagnosis:    Lumbar laminectomy syndrome    Assessment and Plan:   78-year-old woman, with chronic low back pain secondary to prior multilevel lumbar laminectomy and fusion.  I recommended physical therapy and core strengthening.  I can see her back as needed.    The patient was in agreement with the plan. At the end of the visit today, the patient felt that all questions had been answered satisfactorily.  The patient was pleased with the visit and very appreciative for the care rendered.     Thank you very much for the kind referral.  It is a privilege, and a pleasure, to partner with you in the care of your patients.  I would be delighted to assist you with any further consultations as needed.          Julien Rodriguez MD    Chief of Spine Surgery, [facility]  Director of Spine Service, [facility]  ,  ______________________________________________________________________    Anesthesia Discharge Instructions    After general anesthesia or intervenous sedation, for 24 hours or while taking prescription Narcotics:  Limit your activities  Do not drive or operate hazardous machinery  If you have not urinated within 8 hours after discharge, please contact your surgeon on call.  Do not make important personal or business decisions  Do not drink alcoholic beverages    Report the following to your surgeon:  Excessive pain, swelling, redness or odor of or around the surgical area  Temperature over 100.5 degrees  Nausea and vomiting lasting longer than 4 hours or if unable to take medication  Any signs of decreased circulation or nerve impairment to extremity:  Change in color, persistent numbness, tingling, coldness or increased pain.  Any questions       Department of Orthopaedics  [facility]  [address] [place] [address]  P: [phone]  [facility]    This note was dictated with voice recognition software.  It has not been proofread for grammatical errors, typographical mistakes or other semantic inconsistencies.

## 2024-08-05 DIAGNOSIS — R91.1 PULMONARY NODULE: Primary | ICD-10-CM

## 2024-08-07 ENCOUNTER — APPOINTMENT (OUTPATIENT)
Dept: PRIMARY CARE | Facility: CLINIC | Age: 79
End: 2024-08-07
Payer: MEDICARE

## 2024-08-14 ENCOUNTER — APPOINTMENT (OUTPATIENT)
Dept: PULMONOLOGY | Facility: CLINIC | Age: 79
End: 2024-08-14
Payer: MEDICARE

## 2024-08-14 VITALS
SYSTOLIC BLOOD PRESSURE: 147 MMHG | OXYGEN SATURATION: 95 % | DIASTOLIC BLOOD PRESSURE: 72 MMHG | HEART RATE: 71 BPM | WEIGHT: 122 LBS | BODY MASS INDEX: 21.61 KG/M2

## 2024-08-14 DIAGNOSIS — R91.1 LUNG NODULE: Primary | ICD-10-CM

## 2024-08-14 PROCEDURE — 3078F DIAST BP <80 MM HG: CPT | Performed by: PEDIATRICS

## 2024-08-14 PROCEDURE — 1159F MED LIST DOCD IN RCRD: CPT | Performed by: PEDIATRICS

## 2024-08-14 PROCEDURE — 1036F TOBACCO NON-USER: CPT | Performed by: PEDIATRICS

## 2024-08-14 PROCEDURE — 99214 OFFICE O/P EST MOD 30 MIN: CPT | Performed by: PEDIATRICS

## 2024-08-14 PROCEDURE — 3077F SYST BP >= 140 MM HG: CPT | Performed by: PEDIATRICS

## 2024-08-14 PROCEDURE — 1160F RVW MEDS BY RX/DR IN RCRD: CPT | Performed by: PEDIATRICS

## 2024-08-14 NOTE — PROGRESS NOTES
"Subjective   Patient ID: Janine Hendrix \"Neeta\" is a 78 y.o. female who presents for lung nodule    HPI    8/14/2024:  Ms Hendrix is doing well.  She has some allergic rhinitis currently that is causing some phlegm in her throat.  She rarely uses albuterol or duoneb.  She had a repeat CT that shows stability of the RML ground glass areas.    Initial visit 7/10/2024:  Ms Hendrix is a 78yr old  with no significant past pulmonary issues that had a chest CT in February.  This shows a new 14-15 ground glass area in the RML that was new compared to prior CT in 2021.  It has the appearance of inflammation but per radiology malignancy cannot be ruled out.    She does have some allergic rhinitis and phlegm that is bothersome.     She is a former smoker, 1/2 - 1ppd starting at age 16, she quit for the most part in 1997 but for several more years would on occasion smoke she completely quit at around age 71.    Review of Systems    See scanned documents attached to this note for review of systems, and appropriate scales/scores for this visit.     Objective   Physical Exam  Constitutional:       Appearance: Normal appearance.   HENT:      Head: Normocephalic and atraumatic.      Mouth/Throat:      Pharynx: Oropharynx is clear.   Cardiovascular:      Rate and Rhythm: Normal rate and regular rhythm.      Pulses: Normal pulses.      Heart sounds: Normal heart sounds.   Pulmonary:      Effort: Pulmonary effort is normal.      Breath sounds: Normal breath sounds. No wheezing, rhonchi or rales.   Abdominal:      General: Bowel sounds are normal.      Palpations: Abdomen is soft.   Musculoskeletal:         General: Normal range of motion.   Skin:     General: Skin is warm and dry.   Neurological:      General: No focal deficit present.      Mental Status: She is alert and oriented to person, place, and time.   Psychiatric:         Mood and Affect: Mood normal.       Assessment/Plan     78yr old with new GGO lung nodule     New lung " nodule:  likely inflammatory but cannot rule out malignancy in high risk patient  - GGO areas stable  - will get repeat CT in 6 months    2. Tobacco use:  no symptoms  - no treatment at this point.  Would consider PFT and starting  LAMA/LABA if symptoms increase     Follow up after CT Mid March       Michael Joy MD 08/14/24 8:36 AM

## 2024-08-19 ENCOUNTER — APPOINTMENT (OUTPATIENT)
Dept: PRIMARY CARE | Facility: CLINIC | Age: 79
End: 2024-08-19
Payer: MEDICARE

## 2024-08-19 VITALS
OXYGEN SATURATION: 97 % | HEART RATE: 70 BPM | BODY MASS INDEX: 21.01 KG/M2 | SYSTOLIC BLOOD PRESSURE: 124 MMHG | TEMPERATURE: 97.3 F | DIASTOLIC BLOOD PRESSURE: 52 MMHG | WEIGHT: 118.6 LBS

## 2024-08-19 DIAGNOSIS — Z12.31 SCREENING MAMMOGRAM FOR BREAST CANCER: ICD-10-CM

## 2024-08-19 DIAGNOSIS — R11.0 NAUSEA: Primary | ICD-10-CM

## 2024-08-19 PROBLEM — Z85.3 HISTORY OF BREAST CANCER: Status: ACTIVE | Noted: 2024-08-19

## 2024-08-19 PROCEDURE — 1124F ACP DISCUSS-NO DSCNMKR DOCD: CPT | Performed by: PHYSICIAN ASSISTANT

## 2024-08-19 PROCEDURE — 3074F SYST BP LT 130 MM HG: CPT | Performed by: PHYSICIAN ASSISTANT

## 2024-08-19 PROCEDURE — 1159F MED LIST DOCD IN RCRD: CPT | Performed by: PHYSICIAN ASSISTANT

## 2024-08-19 PROCEDURE — 99213 OFFICE O/P EST LOW 20 MIN: CPT | Performed by: PHYSICIAN ASSISTANT

## 2024-08-19 PROCEDURE — 1036F TOBACCO NON-USER: CPT | Performed by: PHYSICIAN ASSISTANT

## 2024-08-19 PROCEDURE — 3078F DIAST BP <80 MM HG: CPT | Performed by: PHYSICIAN ASSISTANT

## 2024-08-19 PROCEDURE — 1160F RVW MEDS BY RX/DR IN RCRD: CPT | Performed by: PHYSICIAN ASSISTANT

## 2024-08-19 RX ORDER — ONDANSETRON 4 MG/1
4 TABLET, ORALLY DISINTEGRATING ORAL EVERY 8 HOURS PRN
Qty: 9 TABLET | Refills: 0 | Status: SHIPPED | OUTPATIENT
Start: 2024-08-19 | End: 2024-08-26

## 2024-08-19 NOTE — PROGRESS NOTES
It was a pleasure to see Ms. Hendrix at the Neurosurgery Sp[facility] at [facility].     She is a really nice 78 y.o. female  who presents to us with complaints LOW BACK PAIN radiation to LEFT LEG  that started about   15   years  ago, and have been gradually worsening since that time.  The symptoms started after no known injury. She had spine surgery in 2021 at [facility] which relieved some of her symptoms but recently the back pain and leg pain has gotten worse. She describes radiculpathy that radiates into the front of her left and right thigh. She has been using a walker to ambulate for past few months since pain has started worsening. She has not tried pain management or physical therapy.    The pain is 3 /10. The pain is described as aching, stabbing and occurs all day.  Symptoms are exacerbated by sitting. Factors which relieve the pain include narcotic pain medications; ice      Numbness and/or tingling - NO    Weakness : NO    Bladder/Bowel symptoms - NO    The patient has tried medications (eg: gabapentin, NSAIDS and narcotics ) : Yes  PT : Yes    Date: 2023  Pain Management with ESIs/selective nerve blocks  - YES    she is a SMOKER and DIABETIC    History of Depression : NO    PRIOR SPINE SURGERY: YES 2022    Takes ASA and Plavix     NARCOTICS for pain : YES    Part of this patient’s history is from personal review of the patient’s previous charts.      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           Current Outpatient  "Medications:     albuterol 90 mcg/actuation inhaler, INHALE 2 INHALATIONS BY MOUTH  EVERY 6 HOURS IF NEEDED FOR  SHORTNESS OF BREATH, Disp: 17 g, Rfl: 2    amLODIPine (Norvasc) 5 mg tablet, TAKE 1 TABLET BY MOUTH DAILY, Disp: 90 tablet, Rfl: 3    aspirin 81 mg EC tablet, Take 1 tablet (81 mg) by mouth once daily., Disp: , Rfl:     BD Luer-Akilah Syringe 3 mL 22 x 1 1/2\" syringe, 1 every 2 weeks, Disp: , Rfl:     blood sugar diagnostic (True Metrix Glucose Test Strip) strip, 1 strip 3 times a day., Disp: 300 strip, Rfl: 3    blood-glucose meter (True Metrix Glucose Meter) misc, , Disp: , Rfl:     buPROPion XL (Wellbutrin XL) 150 mg 24 hr tablet, Take 1 tablet (150 mg) by mouth once daily in the morning. Do not crush, chew, or split., Disp: 90 tablet, Rfl: 1    citalopram (CeleXA) 10 mg tablet, Take 1 tablet (10 mg) by mouth once daily. (Patient not taking: Reported on 7/10/2024), Disp: 30 tablet, Rfl: 2    clopidogrel (Plavix) 75 mg tablet, Take 1 tablet (75 mg) by mouth once daily., Disp: 90 tablet, Rfl: 3    evolocumab (Repatha SureClick) 140 mg/mL injection, Inject 1 mL (140 mg) under the skin every 14 (fourteen) days. Do not start before March 22, 2024. (Patient not taking: Reported on 8/14/2024), Disp: 6 mL, Rfl: 3    evolocumab (Repatha SureClick) 140 mg/mL injection, Inject 1 mL (140 mg) under the skin every 14 (fourteen) days., Disp: 2 each, Rfl: 0    famotidine (Pepcid) 20 mg tablet, Take 1 tablet (20 mg) by mouth 2 times a day., Disp: 180 tablet, Rfl: 3    FreeStyle Test strip, Test twice daily as directed, Disp: 100 strip, Rfl: 1    hydrALAZINE (Apresoline) 50 mg tablet, TAKE 1 TABLET BY MOUTH TWICE  DAILY, Disp: 180 tablet, Rfl: 3    ipratropium-albuteroL (Duo-Neb) 0.5-2.5 mg/3 mL nebulizer solution, Take 3 mL by nebulization 4 times a day as needed for wheezing or shortness of breath., Disp: 90 mL, Rfl: 3    isosorbide mononitrate ER (Imdur) 30 mg 24 hr tablet, Take 1 tablet (30 mg) by mouth once daily., " Disp: 90 tablet, Rfl: 3    metFORMIN  mg 24 hr tablet, Take 1 tablet (500 mg) by mouth 2 times a day with meals., Disp: 180 tablet, Rfl: 1    metoprolol succinate XL (Toprol-XL) 100 mg 24 hr tablet, TAKE 1 TABLET BY MOUTH DAILY, Disp: 90 tablet, Rfl: 3    nitroglycerin (Nitrostat) 0.4 mg SL tablet, Place 1 tablet (0.4 mg) under the tongue every 5 minutes if needed for chest pain., Disp: 30 tablet, Rfl: 0    pantoprazole (ProtoNix) 40 mg EC tablet, TAKE TAKE 1 TABLET BY MOUTH  TWICE DAILY FOR 60 DAYS, THEN 1  TABLET ONCE DAILY IN THE MORNING BEFORE MEALS THEREAFTER DO NOT  CRUSH, CHEW, OR SPLIT, Disp: 150 tablet, Rfl: 3    sodium chloride (Heron 128) 5 % ophthalmic solution, 2 drops., Disp: , Rfl:     syringe, disposable, (Carepoint Luer Slip Syringe) 1 mL syringe, , Disp: , Rfl:       Review of Systems :   Review of Systems   Constitutional: Negative.   HENT: Negative.     Cardiovascular: Negative.    Respiratory: Negative.     Skin: Negative.    Musculoskeletal:  Positive for back pain and stiffness.   Gastrointestinal: Negative.    Neurological: Negative.    Psychiatric/Behavioral: Negative.     All other systems reviewed and are negative.         EXAM:   There were no vitals filed for this visit.  NEURO: Neurologically patient is awake alert and oriented X 3    No obvious cranial nerve deficit.  Strength is almost 5/5 throughout all motor groups tested with no asymmetric significant motor deficit.  Deep tendon reflexes are symmetric throughout.   Gait : WNL   Sensory examination is intact to touch and painful stimuli.      IMAGING:   Xray from 5/2024 shows hardware L3-S1 in place without complication. There is mild retrolisthesis of L2-3 level    ASSESSMENT AND PLAN:  This is a really nice 79 YO female with history of HTN, DM, prior CVA, asthma, prior L3-S1 fusion complicated by hematoma in 2022, p/w likley adjacent segment disease with new BLE pain mainly in her thighs.    Her neurological examination is  benign with no focal deficit.  She is not interested in any surgical intervention and I do not see her requiring any surgical treatment either at this point given the fact that her x-rays does not show any evidence of inspection failure and underlying advanced imaging but I would say if necessary at this point of time.. At this time, we will refer to pain management.     It was a pleasure to participate in Ms. Hendrix clinical care. All questions were answered to her satisfaction and she explained understanding of the further treatment plan.     Dany Jaimes MD, [facility]   of Neurological Surgery  [facility] [employer]  Attending Surgeon  Director - Minimally Invasive Spine Surgery  [place], [place]      ---Some of this note was completed using Dragon voice recognition technology and sometimes the software misinterprets words. This may include unintended errors with respect to translation of words, typographical errors or grammar errors which may not have been identified prior to finalization of the chart note. Please take this into account when reading this note---

## 2024-08-19 NOTE — PROGRESS NOTES
Subjective     HPI   Janine Hendrix is a 78 y.o. year old female patient with presenting to clinic with concern for   Chief Complaint   Patient presents with    Follow-up     3 mth        3mo follow up    Advance Care Planning    Advance Care Planning was discussed with patient/family. Advanced directives and POA and the patient's Advance Care Plan can be documented in the EMR when/if they have plans in order and provide paperwork    Back pain has been an issue. She is seeing the back specialist tomorrow. Using ice and then heat previously, lidocaine patches. Used a few left over oxycodone tablets once in a while when pain is unmanageable.       I had recommended they call Dr Nelson and see about stopping donazepil. She stopped it after a few days.     Dr Everett Nelson neuro- requested records, scanned in 5/31/24. Concern for vascular changes.   Advised not allowed to drive anymore.  Put on donazepil 5 mg 1 month ago, has had mood changes and frustration and angry and depressed. Recommended they call and see about changing or stopping this medication.    Feeling more depressed recently. Daughter notes that it could be related to being told she can't drive (though she hasn't driven in moths anyway) or loss of independence, or related to the medication. Pt is frustrated with confusion and denies episodes of confusion issues or dementia. States that the house is busy and confusing.     Doing well on celexa. Stopped wellbutrin.     Sees Dr. Escalante- Last visit 5/16/24   Very pleasant 78 year old female with a history of severe multi-vessel CAD with PCI to LCx and LAD in 10/2020, PCI to Lcx in 4/23, carotid stenosis, DM, HTN, HLD and spinal stenosis.   Doing well from a cardiac standpoint, repeat blood pressure 124/78.     Plan   -continue Aspirin 81mg daily indefinitely, current Hydralazine, imdur, repatha, metoprolol and amlodipine       Dr Rodriguez  Lumbar laminectomy syndrome    X-rays demonstrate stable L3-S1  laminectomy and fusion.  No significant adjacent segment changes.    chronic low back pain secondary to prior multilevel lumbar laminectomy and fusion.  I recommended physical therapy and core strengthening.    MI, HTN, dysphagia, DM, DVT, Breast cancer s/p mastectomy, arthritis, depression/adjustment disorder, HLD, memory loss, vertigo, carotid stenosis s/p CEA L .     Patient Active Problem List   Diagnosis    Abnormal skin growth    Anxiety with depression    Bilateral carotid artery stenosis    BPPV (benign paroxysmal positional vertigo)    Chronic fatigue and malaise    Chronic low back pain    Chronic right hip pain    Coronary artery disease involving native coronary artery of native heart without angina pectoris    Dementia without behavioral disturbance (Multi)    DM2 (diabetes mellitus, type 2) (Multi)    Dyslipidemia    Dysphagia, cricopharyngeal    Essential hypertension    GERD (gastroesophageal reflux disease)    History of heart artery stent    History of total mastectomy of left breast    Hypokalemia    Hypomagnesemia    Injury of right common femoral artery    Intraoperative complication involving circulatory system associated with cardiac catheterization    Oropharyngeal dysphagia    PAD (peripheral artery disease) (CMS-[facility])    Pain of left upper extremity    Pain of right upper extremity    Panic attacks    Pernicious anemia    Right leg claudication (CMS-[facility])    SCC (squamous cell carcinoma)    Type 2 diabetes mellitus without complication, without long-term current use of insulin (Multi)    Vitamin D deficiency    Zenker's diverticulum    Allergic rhinitis    Insomnia    Other specified diseases of spinal cord (Multi)    History of breast cancer       Past Medical History:   Diagnosis Date    Aspiration pneumonia (Multi) 04/04/2023    DM2 (diabetes mellitus, type 2) (Multi)     Dyslipidemia     Encounter for other preprocedural examination 10/19/2021    Pre-operative clearance    GERD  "(gastroesophageal reflux disease)     HTN (hypertension), benign     Pain in left hip 2020    Left hip pain    Thrombosis of external iliac artery (Multi) 2023      Past Surgical History:   Procedure Laterality Date    CT ABDOMEN PELVIS ANGIOGRAM W AND/OR WO IV CONTRAST  10/28/2020    CT ABDOMEN PELVIS ANGIOGRAM W AND/OR WO IV CONTRAST 10/28/2020 [facility]    MASTECTOMY Left 1999    malignant    OTHER SURGICAL HISTORY  2020    Vascular surgical procedure    OTHER SURGICAL HISTORY  2020    Cardiac catheterization      Family History   Problem Relation Name Age of Onset    Breast cancer Mother      No Known Problems Father      No Known Problems Other        Social History     Tobacco Use    Smoking status: Former     Current packs/day: 0.00     Average packs/day: 0.5 packs/day for 5.0 years (2.5 ttl pk-yrs)     Types: Cigarettes     Start date:      Quit date:      Years since quittin.6    Smokeless tobacco: Never   Substance Use Topics    Alcohol use: Yes     Comment: rare        Current Outpatient Medications:     albuterol 90 mcg/actuation inhaler, INHALE 2 INHALATIONS BY MOUTH  EVERY 6 HOURS IF NEEDED FOR  SHORTNESS OF BREATH, Disp: 17 g, Rfl: 2    amLODIPine (Norvasc) 5 mg tablet, TAKE 1 TABLET BY MOUTH DAILY, Disp: 90 tablet, Rfl: 3    aspirin 81 mg EC tablet, Take 1 tablet (81 mg) by mouth once daily., Disp: , Rfl:     BD Luer-Akilah Syringe 3 mL 22 x 1 1/2\" syringe, 1 every 2 weeks, Disp: , Rfl:     blood sugar diagnostic (True Metrix Glucose Test Strip) strip, 1 strip 3 times a day., Disp: 300 strip, Rfl: 3    blood-glucose meter (True Metrix Glucose Meter) misc, , Disp: , Rfl:     citalopram (CeleXA) 10 mg tablet, Take 1 tablet (10 mg) by mouth once daily., Disp: 30 tablet, Rfl: 2    clopidogrel (Plavix) 75 mg tablet, Take 1 tablet (75 mg) by mouth once daily., Disp: 90 tablet, Rfl: 3    evolocumab (Repatha SureClick) 140 mg/mL injection, Inject 1 mL (140 mg) under " the skin every 14 (fourteen) days. Do not start before March 22, 2024., Disp: 6 mL, Rfl: 3    famotidine (Pepcid) 20 mg tablet, Take 1 tablet (20 mg) by mouth 2 times a day., Disp: 180 tablet, Rfl: 3    FreeStyle Test strip, Test twice daily as directed, Disp: 100 strip, Rfl: 1    hydrALAZINE (Apresoline) 50 mg tablet, TAKE 1 TABLET BY MOUTH TWICE  DAILY, Disp: 180 tablet, Rfl: 3    ipratropium-albuteroL (Duo-Neb) 0.5-2.5 mg/3 mL nebulizer solution, Take 3 mL by nebulization 4 times a day as needed for wheezing or shortness of breath., Disp: 90 mL, Rfl: 3    isosorbide mononitrate ER (Imdur) 30 mg 24 hr tablet, Take 1 tablet (30 mg) by mouth once daily., Disp: 90 tablet, Rfl: 3    metFORMIN  mg 24 hr tablet, Take 1 tablet (500 mg) by mouth 2 times a day with meals., Disp: 180 tablet, Rfl: 1    metoprolol succinate XL (Toprol-XL) 100 mg 24 hr tablet, TAKE 1 TABLET BY MOUTH DAILY, Disp: 90 tablet, Rfl: 3    nitroglycerin (Nitrostat) 0.4 mg SL tablet, Place 1 tablet (0.4 mg) under the tongue every 5 minutes if needed for chest pain., Disp: 30 tablet, Rfl: 0    pantoprazole (ProtoNix) 40 mg EC tablet, TAKE TAKE 1 TABLET BY MOUTH  TWICE DAILY FOR 60 DAYS, THEN 1  TABLET ONCE DAILY IN THE MORNING BEFORE MEALS THEREAFTER DO NOT  CRUSH, CHEW, OR SPLIT, Disp: 150 tablet, Rfl: 3    sodium chloride (Heron 128) 5 % ophthalmic solution, 2 drops., Disp: , Rfl:     syringe, disposable, (Carepoint Luer Slip Syringe) 1 mL syringe, , Disp: , Rfl:     ondansetron ODT (Zofran-ODT) 4 mg disintegrating tablet, Take 1 tablet (4 mg) by mouth every 8 hours if needed for nausea or vomiting for up to 7 days., Disp: 9 tablet, Rfl: 0     Review of Systems  Constitutional: Denies fever  HEENT: Denies ST, earache  CVS: Denies Chest pain  Pulmonary: Denies wheezing, SOB  GI: Denies N/V  : Denies dysuria  Musculoskeletal:  Denies myalgia  Neuro: Denies focal weakness or numbness.  Skin: Denies Rashes.  *Review of Systems is negative unless  otherwise mentioned in HPI or ROS above.    Objective   /52   Pulse 70   Temp 36.3 °C (97.3 °F)   Wt 53.8 kg (118 lb 9.6 oz)   SpO2 97%   BMI 21.01 kg/m²  reviewed Body mass index is 21.01 kg/m².     Physical Exam  Constitutional: NAD.  Resting comfortably.  Head: Atraumatic, normocephalic.  ENT: Moist oral mucosa. Nasal mucosa wnl.   Cardiac: Regular rate & rhythm.   Pulmonary: Lungs clear bilat  GI: Soft, Nontender, nondistended.   Musculoskeletal: No peripheral edema.   Skin: No evidence of trauma. No rashes  Psych: Intact judgement and insight.    .Assessment/Plan   Problem List Items Addressed This Visit    None  Visit Diagnoses         Codes    Nausea    -  Primary R11.0    Relevant Medications    ondansetron ODT (Zofran-ODT) 4 mg disintegrating tablet

## 2024-08-20 ENCOUNTER — OFFICE VISIT (OUTPATIENT)
Dept: NEUROSURGERY | Facility: CLINIC | Age: 79
End: 2024-08-20
Payer: MEDICARE

## 2024-08-20 VITALS
HEART RATE: 74 BPM | SYSTOLIC BLOOD PRESSURE: 128 MMHG | BODY MASS INDEX: 20.91 KG/M2 | WEIGHT: 118 LBS | TEMPERATURE: 96.3 F | HEIGHT: 63 IN | DIASTOLIC BLOOD PRESSURE: 59 MMHG

## 2024-08-20 DIAGNOSIS — M54.50 CHRONIC LOW BACK PAIN, UNSPECIFIED BACK PAIN LATERALITY, UNSPECIFIED WHETHER SCIATICA PRESENT: ICD-10-CM

## 2024-08-20 DIAGNOSIS — Z98.890 S/P SPINAL SURGERY: Primary | ICD-10-CM

## 2024-08-20 DIAGNOSIS — G89.29 CHRONIC LOW BACK PAIN, UNSPECIFIED BACK PAIN LATERALITY, UNSPECIFIED WHETHER SCIATICA PRESENT: ICD-10-CM

## 2024-08-20 PROCEDURE — 1125F AMNT PAIN NOTED PAIN PRSNT: CPT | Performed by: NEUROLOGICAL SURGERY

## 2024-08-20 PROCEDURE — 99214 OFFICE O/P EST MOD 30 MIN: CPT | Mod: GC | Performed by: NEUROLOGICAL SURGERY

## 2024-08-20 PROCEDURE — 99204 OFFICE O/P NEW MOD 45 MIN: CPT | Performed by: NEUROLOGICAL SURGERY

## 2024-08-20 PROCEDURE — 1036F TOBACCO NON-USER: CPT | Performed by: NEUROLOGICAL SURGERY

## 2024-08-20 PROCEDURE — 3074F SYST BP LT 130 MM HG: CPT | Performed by: NEUROLOGICAL SURGERY

## 2024-08-20 PROCEDURE — 3078F DIAST BP <80 MM HG: CPT | Performed by: NEUROLOGICAL SURGERY

## 2024-08-20 ASSESSMENT — ENCOUNTER SYMPTOMS
CARDIOVASCULAR NEGATIVE: 1
NEUROLOGICAL NEGATIVE: 1
RESPIRATORY NEGATIVE: 1
STIFFNESS: 1
BACK PAIN: 1
GASTROINTESTINAL NEGATIVE: 1
PSYCHIATRIC NEGATIVE: 1
CONSTITUTIONAL NEGATIVE: 1

## 2024-08-20 ASSESSMENT — PAIN SCALES - GENERAL: PAINLEVEL: 3

## 2024-09-09 DIAGNOSIS — F33.1 MODERATE EPISODE OF RECURRENT MAJOR DEPRESSIVE DISORDER (MULTI): ICD-10-CM

## 2024-09-09 RX ORDER — CITALOPRAM 10 MG/1
10 TABLET ORAL DAILY
Qty: 90 TABLET | Refills: 1 | Status: SHIPPED | OUTPATIENT
Start: 2024-09-09

## 2024-10-09 ENCOUNTER — TELEMEDICINE (OUTPATIENT)
Dept: PRIMARY CARE | Facility: CLINIC | Age: 79
End: 2024-10-09
Payer: MEDICARE

## 2024-10-09 ENCOUNTER — APPOINTMENT (OUTPATIENT)
Dept: PRIMARY CARE | Facility: CLINIC | Age: 79
End: 2024-10-09
Payer: MEDICARE

## 2024-10-09 DIAGNOSIS — J01.00 ACUTE NON-RECURRENT MAXILLARY SINUSITIS: Primary | ICD-10-CM

## 2024-10-09 DIAGNOSIS — J40 BRONCHITIS: ICD-10-CM

## 2024-10-09 PROCEDURE — 1036F TOBACCO NON-USER: CPT | Performed by: PHYSICIAN ASSISTANT

## 2024-10-09 PROCEDURE — 1160F RVW MEDS BY RX/DR IN RCRD: CPT | Performed by: PHYSICIAN ASSISTANT

## 2024-10-09 PROCEDURE — 99213 OFFICE O/P EST LOW 20 MIN: CPT | Performed by: PHYSICIAN ASSISTANT

## 2024-10-09 PROCEDURE — 1159F MED LIST DOCD IN RCRD: CPT | Performed by: PHYSICIAN ASSISTANT

## 2024-10-09 RX ORDER — PROMETHAZINE HYDROCHLORIDE AND DEXTROMETHORPHAN HYDROBROMIDE 6.25; 15 MG/5ML; MG/5ML
5 SYRUP ORAL 4 TIMES DAILY PRN
Qty: 120 ML | Refills: 0 | Status: SHIPPED | OUTPATIENT
Start: 2024-10-09 | End: 2024-11-08

## 2024-10-09 RX ORDER — AMOXICILLIN AND CLAVULANATE POTASSIUM 875; 125 MG/1; MG/1
875 TABLET, FILM COATED ORAL 2 TIMES DAILY
Qty: 20 TABLET | Refills: 0 | Status: SHIPPED | OUTPATIENT
Start: 2024-10-09 | End: 2024-10-19

## 2024-10-09 NOTE — PROGRESS NOTES
An interactive audio and video telecommunication system which permits real time communications between the patient (at the originating site) and provider (at the distant site) was utilized to provide this telehealth service.   Verbal consent was requested and obtained from Janine Hendrix on this date, 10/09/24 for a telehealth visit.     Subjective    Janine Hendrix is a 79 y.o. year old female patient presenting for virtual visit   Chief Complaint   Patient presents with    Cough      5 days of wet cough and sinus pressure and drainage.  Cough worsens while supine.  No fevers.  Mild sore throat with postnasal drip.  Yellow mucus production per her daughter who is helping provide history  Admits nasal congestion, ST, cough. Worse at night. Using nebulizer at home helps. She is fatigued,  Denies chest congestion, malaise, myalgia, change in taste/smell, fever, SOB, diarrhea, N/V.       Patient Active Problem List   Diagnosis    Abnormal skin growth    Anxiety with depression    Bilateral carotid artery stenosis    BPPV (benign paroxysmal positional vertigo)    Chronic fatigue and malaise    Chronic low back pain    Chronic right hip pain    Coronary artery disease involving native coronary artery of native heart without angina pectoris    Dementia without behavioral disturbance (Multi)    DM2 (diabetes mellitus, type 2) (Multi)    Dyslipidemia    Dysphagia, cricopharyngeal    Essential hypertension    GERD (gastroesophageal reflux disease)    History of heart artery stent    History of total mastectomy of left breast    Hypokalemia    Hypomagnesemia    Injury of right common femoral artery    Intraoperative complication involving circulatory system associated with cardiac catheterization    Oropharyngeal dysphagia    PAD (peripheral artery disease) (CMS-[facility])    Pain of left upper extremity    Pain of right upper extremity    Panic attacks    Pernicious anemia    Right leg claudication (CMS-[facility])    SCC (squamous  cell carcinoma)    Type 2 diabetes mellitus without complication, without long-term current use of insulin (Multi)    Vitamin D deficiency    Zenker's diverticulum    Allergic rhinitis    Insomnia    Other specified diseases of spinal cord    History of breast cancer       Past Medical History:   Diagnosis Date    Aspiration pneumonia (Multi) 2023    DM2 (diabetes mellitus, type 2) (Multi)     Dyslipidemia     Encounter for other preprocedural examination 10/19/2021    Pre-operative clearance    GERD (gastroesophageal reflux disease)     HTN (hypertension), benign     Pain in left hip 2020    Left hip pain    Thrombosis of external iliac artery (Multi) 2023      Past Surgical History:   Procedure Laterality Date    CT ABDOMEN PELVIS ANGIOGRAM W AND/OR WO IV CONTRAST  10/28/2020    CT ABDOMEN PELVIS ANGIOGRAM W AND/OR WO IV CONTRAST 10/28/2020 [facility]    MASTECTOMY Left     malignant    OTHER SURGICAL HISTORY  2020    Vascular surgical procedure    OTHER SURGICAL HISTORY  2020    Cardiac catheterization      Family History   Problem Relation Name Age of Onset    Breast cancer Mother      No Known Problems Father      No Known Problems Other        Social History     Tobacco Use    Smoking status: Former     Current packs/day: 0.00     Average packs/day: 0.5 packs/day for 5.0 years (2.5 ttl pk-yrs)     Types: Cigarettes     Start date:      Quit date: 2019     Years since quittin.7    Smokeless tobacco: Never   Substance Use Topics    Alcohol use: Yes     Comment: rare        Current Outpatient Medications:     albuterol 90 mcg/actuation inhaler, INHALE 2 INHALATIONS BY MOUTH  EVERY 6 HOURS IF NEEDED FOR  SHORTNESS OF BREATH, Disp: 17 g, Rfl: 2    amLODIPine (Norvasc) 5 mg tablet, TAKE 1 TABLET BY MOUTH DAILY, Disp: 90 tablet, Rfl: 3    amoxicillin-pot clavulanate (Augmentin) 875-125 mg tablet, Take 1 tablet (875 mg) by mouth 2 times a day for 10 days., Disp: 20  "tablet, Rfl: 0    aspirin 81 mg EC tablet, Take 1 tablet (81 mg) by mouth once daily., Disp: , Rfl:     BD Luer-Akilah Syringe 3 mL 22 x 1 1/2\" syringe, 1 every 2 weeks, Disp: , Rfl:     blood sugar diagnostic (True Metrix Glucose Test Strip) strip, 1 strip 3 times a day., Disp: 300 strip, Rfl: 3    blood-glucose meter (True Metrix Glucose Meter) misc, , Disp: , Rfl:     citalopram (CeleXA) 10 mg tablet, Take 1 tablet (10 mg) by mouth once daily., Disp: 90 tablet, Rfl: 1    clopidogrel (Plavix) 75 mg tablet, Take 1 tablet (75 mg) by mouth once daily., Disp: 90 tablet, Rfl: 3    evolocumab (Repatha SureClick) 140 mg/mL injection, Inject 1 mL (140 mg) under the skin every 14 (fourteen) days. Do not start before March 22, 2024., Disp: 6 mL, Rfl: 3    famotidine (Pepcid) 20 mg tablet, Take 1 tablet (20 mg) by mouth 2 times a day., Disp: 180 tablet, Rfl: 3    FreeStyle Test strip, Test twice daily as directed, Disp: 100 strip, Rfl: 1    hydrALAZINE (Apresoline) 50 mg tablet, TAKE 1 TABLET BY MOUTH TWICE  DAILY, Disp: 180 tablet, Rfl: 3    ipratropium-albuteroL (Duo-Neb) 0.5-2.5 mg/3 mL nebulizer solution, Take 3 mL by nebulization 4 times a day as needed for wheezing or shortness of breath., Disp: 90 mL, Rfl: 3    isosorbide mononitrate ER (Imdur) 30 mg 24 hr tablet, Take 1 tablet (30 mg) by mouth once daily., Disp: 90 tablet, Rfl: 3    metFORMIN  mg 24 hr tablet, Take 1 tablet (500 mg) by mouth 2 times a day with meals., Disp: 180 tablet, Rfl: 1    metoprolol succinate XL (Toprol-XL) 100 mg 24 hr tablet, TAKE 1 TABLET BY MOUTH DAILY, Disp: 90 tablet, Rfl: 3    nitroglycerin (Nitrostat) 0.4 mg SL tablet, Place 1 tablet (0.4 mg) under the tongue every 5 minutes if needed for chest pain., Disp: 30 tablet, Rfl: 0    pantoprazole (ProtoNix) 40 mg EC tablet, TAKE TAKE 1 TABLET BY MOUTH  TWICE DAILY FOR 60 DAYS, THEN 1  TABLET ONCE DAILY IN THE MORNING BEFORE MEALS THEREAFTER DO NOT  CRUSH, CHEW, OR SPLIT, Disp: 150 tablet, " Rfl: 3    promethazine-DM (Phenergan-DM) 6.25-15 mg/5 mL syrup, Take 5 mL by mouth 4 times a day as needed for cough., Disp: 120 mL, Rfl: 0    sodium chloride (Heron 128) 5 % ophthalmic solution, 2 drops., Disp: , Rfl:     syringe, disposable, (Carepoint Luer Slip Syringe) 1 mL syringe, , Disp: , Rfl:      Review of Systems    Review of Systems:   Constitutional: Denies fever  HEENT: Denies ST, earache  CVS: Denies Chest pain  Pulmonary: Denies wheezing, SOB  GI: Denies N/V  : Denies dysuria  Musculoskeletal:  Denies myalgia  Neuro: Denies focal weakness or numbness.  Skin: Denies Rashes.  *Review of Systems is negative unless otherwise mentioned in HPI or ROS above.     Objective    VITALS  Pt does not have vitals available at time of visit.    Exam       Limited physical exam in virtual platform  Nontoxic. No acute distress.  Nonlabored breathing. Speaking full sentences without difficulty.     Assessment/Plan      Problem List Items Addressed This Visit    None  Visit Diagnoses       Acute non-recurrent maxillary sinusitis    -  Primary    Relevant Medications    amoxicillin-pot clavulanate (Augmentin) 875-125 mg tablet    Bronchitis        Relevant Medications    promethazine-DM (Phenergan-DM) 6.25-15 mg/5 mL syrup    amoxicillin-pot clavulanate (Augmentin) 875-125 mg tablet

## 2024-10-11 DIAGNOSIS — R06.2 WHEEZING: ICD-10-CM

## 2024-10-11 RX ORDER — IPRATROPIUM BROMIDE AND ALBUTEROL SULFATE 2.5; .5 MG/3ML; MG/3ML
3 SOLUTION RESPIRATORY (INHALATION) 4 TIMES DAILY PRN
Qty: 90 ML | Refills: 0 | Status: SHIPPED | OUTPATIENT
Start: 2024-10-11

## 2024-10-14 DIAGNOSIS — E11.9 TYPE 2 DIABETES MELLITUS WITHOUT COMPLICATION, UNSPECIFIED WHETHER LONG TERM INSULIN USE (MULTI): ICD-10-CM

## 2024-10-14 RX ORDER — METFORMIN HYDROCHLORIDE 500 MG/1
500 TABLET, EXTENDED RELEASE ORAL
Qty: 180 TABLET | Refills: 3 | Status: SHIPPED | OUTPATIENT
Start: 2024-10-14

## 2024-11-19 ENCOUNTER — TELEPHONE (OUTPATIENT)
Dept: PRIMARY CARE | Facility: CLINIC | Age: 79
End: 2024-11-19
Payer: MEDICARE

## 2024-11-19 NOTE — TELEPHONE ENCOUNTER
She has her appointment set for her mammogram, she had a masectomy on left side, should there be any imaging on her left side?  She also got her high dose flu shot and RSV last month

## 2024-11-27 ENCOUNTER — TELEPHONE (OUTPATIENT)
Dept: PRIMARY CARE | Facility: CLINIC | Age: 79
End: 2024-11-27
Payer: MEDICARE

## 2024-11-27 DIAGNOSIS — R35.0 URINARY FREQUENCY: Primary | ICD-10-CM

## 2024-12-04 ENCOUNTER — LAB (OUTPATIENT)
Dept: LAB | Facility: LAB | Age: 79
End: 2024-12-04
Payer: MEDICARE

## 2024-12-04 DIAGNOSIS — R35.0 URINARY FREQUENCY: ICD-10-CM

## 2024-12-04 LAB
APPEARANCE UR: CLEAR
BILIRUB UR STRIP.AUTO-MCNC: NEGATIVE MG/DL
COLOR UR: NORMAL
GLUCOSE UR STRIP.AUTO-MCNC: NORMAL MG/DL
HOLD SPECIMEN: NORMAL
KETONES UR STRIP.AUTO-MCNC: NEGATIVE MG/DL
LEUKOCYTE ESTERASE UR QL STRIP.AUTO: NEGATIVE
NITRITE UR QL STRIP.AUTO: NEGATIVE
PH UR STRIP.AUTO: 5.5 [PH]
PROT UR STRIP.AUTO-MCNC: NEGATIVE MG/DL
RBC # UR STRIP.AUTO: NEGATIVE /UL
SP GR UR STRIP.AUTO: 1.02
UROBILINOGEN UR STRIP.AUTO-MCNC: NORMAL MG/DL

## 2024-12-04 PROCEDURE — 81003 URINALYSIS AUTO W/O SCOPE: CPT

## 2024-12-11 ENCOUNTER — HOSPITAL ENCOUNTER (OUTPATIENT)
Dept: RADIOLOGY | Facility: HOSPITAL | Age: 79
Discharge: HOME | End: 2024-12-11
Payer: MEDICARE

## 2024-12-11 ENCOUNTER — APPOINTMENT (OUTPATIENT)
Dept: RADIOLOGY | Facility: HOSPITAL | Age: 79
End: 2024-12-11
Payer: MEDICARE

## 2024-12-11 VITALS — BODY MASS INDEX: 25.69 KG/M2 | HEIGHT: 63 IN | WEIGHT: 145 LBS

## 2024-12-11 DIAGNOSIS — Z12.31 SCREENING MAMMOGRAM FOR BREAST CANCER: ICD-10-CM

## 2024-12-11 PROCEDURE — 77061 BREAST TOMOSYNTHESIS UNI: CPT | Mod: 52,RT

## 2024-12-11 PROCEDURE — 77067 SCR MAMMO BI INCL CAD: CPT | Mod: RIGHT SIDE | Performed by: RADIOLOGY

## 2024-12-11 PROCEDURE — 77063 BREAST TOMOSYNTHESIS BI: CPT | Mod: RIGHT SIDE | Performed by: RADIOLOGY

## 2025-01-07 ENCOUNTER — APPOINTMENT (OUTPATIENT)
Dept: GERIATRIC MEDICINE | Facility: CLINIC | Age: 80
End: 2025-01-07
Payer: MEDICARE

## 2025-01-07 ENCOUNTER — OFFICE VISIT (OUTPATIENT)
Dept: NEUROLOGY | Facility: CLINIC | Age: 80
End: 2025-01-07
Payer: MEDICARE

## 2025-01-07 VITALS — RESPIRATION RATE: 18 BRPM | SYSTOLIC BLOOD PRESSURE: 143 MMHG | DIASTOLIC BLOOD PRESSURE: 70 MMHG | HEART RATE: 64 BPM

## 2025-01-07 DIAGNOSIS — F02.80 MIXED ALZHEIMER AND VASCULAR DEMENTIA: Primary | ICD-10-CM

## 2025-01-07 DIAGNOSIS — F01.50 MIXED ALZHEIMER AND VASCULAR DEMENTIA: Primary | ICD-10-CM

## 2025-01-07 DIAGNOSIS — G30.9 MIXED ALZHEIMER AND VASCULAR DEMENTIA: Primary | ICD-10-CM

## 2025-01-07 PROCEDURE — 3078F DIAST BP <80 MM HG: CPT | Performed by: PSYCHIATRY & NEUROLOGY

## 2025-01-07 PROCEDURE — 3077F SYST BP >= 140 MM HG: CPT | Performed by: PSYCHIATRY & NEUROLOGY

## 2025-01-07 PROCEDURE — 1036F TOBACCO NON-USER: CPT | Performed by: PSYCHIATRY & NEUROLOGY

## 2025-01-07 PROCEDURE — 99215 OFFICE O/P EST HI 40 MIN: CPT | Performed by: PSYCHIATRY & NEUROLOGY

## 2025-01-07 PROCEDURE — 1126F AMNT PAIN NOTED NONE PRSNT: CPT | Performed by: PSYCHIATRY & NEUROLOGY

## 2025-01-07 PROCEDURE — 99205 OFFICE O/P NEW HI 60 MIN: CPT | Performed by: PSYCHIATRY & NEUROLOGY

## 2025-01-07 ASSESSMENT — PAIN SCALES - GENERAL: PAINLEVEL_OUTOF10: 0-NO PAIN

## 2025-01-08 NOTE — PROGRESS NOTES
[place], [place]      Department of Neurology  [facility]     Initial Consultation    PCP:  Jessica Burnette PA-C          2025  Re:  Janine Hendrix      : 1945   MRN [id number]    CC: 80yo woman referred for the evaluation of cognitive impairment pt w/ sabina Iman.  Info from pt and the EMR.    A&P: History: Pt w/ fainting spells w/o injury, now subsided, then increasing forgetfulness that may be improving.  Genl exam is unremarkable.  Neuro exam w/ distal sensory loss and subtly unsteady, irregularly paced gait.  Cognitive exam with mild, bilateral hand dyspraxia and poor Rt motor sequence learning.   MMSE=21/30 c/w moderate cognitive impairment.  GDS = 2/15 not suggestive of a mood disorder.  Labs are notable for GFR=66, R8=370, and NH3=14.  EKG from 2024 showed a slow sinus heart rate = 62.  Brain MRI shows moderate cerebral atrophy with bihemispheric WM small vessel ischemic changes.       Interpret: Pt is interested in trying a low dose of memantine with GFR=66.  I would consider up titration of well-tolerated and GFR stable.    Plan: Pt had SEs from unknown med previously Rxd for dementia.  GFR=66 okay for memantine.  Cardiac hx directs caution against bradycardiogenic AchE inhibs.  She is already on a Mab (evolumab for HLD) and cardiac and cerebral vasc hx suggests that maintain that should be high priority, precluding antiAD Mab Rx (Sabina; “That's the only thing that works for her.”)       F/U:  I discussed these matters with the pt and daughter.  They asked questions and showed good understanding.  I will arrange RTC in 3 months & encouraged contact for issues arising.     Thank you for allowing me to participate in your care.   Sincerely yours, Taz Wilkes M.D., Ph.D.    History of Present Illness:   : In course of smoking cessation she had a couple of fainting spells w/o injury.  :  She tells me that she forgets dates and words  (word finding difficulty).  Sabina tells me that mom's WFD interferes with making sentences and maintain conversation.   No acute changes, gradual issues dxd by Dr. Nelson on exam and MRI.  :  In the past six months she has seemed a bit better.  Her sabina takes that to mean that some of her mom's issues might have been depression.  Pt endorses that interpretation.  Sabina adds that pt has a lot of STM issues retaining.    Med Hx   Allergies: adhesive tape/silicones, bloodstop bandageàrash,     gabapentinà?  Rx:   [id number],  [id number],         evolocuMAB inj q14d,          imdur30,     [id number],    amlodipine5,    [id number],     [id number],   nyldjiopoh37,    [id number],   vxhljj99sxc, ytudarqg70,     albuterol inh  H/O: bilat carotid stenosis (), CAD w/ MI (),   HTN,  HLD,  PAD,     RtLE claudication,  Lt Breast CA,   anxiety/depression,  BPPV, DM2,   GERD, dysphagia, Zenkers divertic, hypoK, hypoMg,  Rt femoral art injury,     insomnia,   dementia w/o behav disturb,  spinal disease   skin CA  S/P: coronary (Lt main) and cerebral stents, Lt total mastex,  cataractXOU,  lumbar surg ()   3 C sections  Implants: stents as above  Trauma: no head injury    FHx  Par: Dad strokes; Mom CA       Sibs:  2 sis ( 1 stroke, 1 MI), bro w/ strokes, hrt dis, CA)  Kids:  2 girls (1 w/ CVA 31yo), 1 boy  (gdau drives for them)    PSHx  Marital:  Home: [facility] (in law suite)    Educ: 11th grade    Employ: [employer]   Driving: stopped     Sleep: used to snore   Exercise: walks at home Firearms: guns+/-locked  EtOH:   rarely    Smoking: stopped      Subs Abuse: none         ROS  Genl: w/ Skin-Skel chronic back pain,    Cardio-pulm nl   U/L-GI nl    Neuro:  w/o LBP nl  w/o neck pain wnl w/o HAs wnl   w/o CVA, TIA, TMB,  VBI     Physical Exam  Vitals: HZ=282/70, HR=64, RR=18, bq=613  General:  Neck FROM w/o pain  Eye gnds w/ nl discs & vsls    Lungs w/ Rt>Lt dry crackles occass irreg of  RRR w/o MRG,   full carotids w/o T/B        Abdo soft +BS no bruit       Extrems w/o depend edema    Neurological Exam  Mental State: Affect:  not anxious or sad   preserved range    no hallucins, delusions, or agitation  Cranial Nerves: Vision: PERRL,  VFF OU to finger approach     Versions: FEOM horiz & vert w/o nystagmus or diplopia  lids w/o ptosis   Motility:  intact saccs, pursuit, and hOKNs   Face: Sens symm to LT & cold,     Expression: symm tone & mvmt   Hearing to voice wnl less on AD, Tongue: mobile w/o fascics    Shoulders: symm mobile  Motor: strong proportionate to bulk      w/o UE drift            w/ steady  bilat   tone: relaxed and symm         w/o invol mvmts  w/o synkinesia    Reflexes: MSRs trace UEs,  +1 KJs, absent AJs (Rt 0, Lt tarce)    w/o clonus  or spread   Release:  no Nunes's/grasp/palmoment,      Sensation:  symm LT, cold, with more distinct   face > hands >> ankles (~symm)   Romberg unsteady   symm sway <1cm   (does well w/ walker, in training)  Coordination: FT fast w/ reg pace bilat     FNF accurate & symm to fixed target     Gait: short pace, mildly irreg stride, trace armswing precarious turning in 4.0 steps,   unstable tandem forward bilat    Cognitive Exam   Handedness:  fully RH     Language:  primary: American English    recept:  answers & follows instrucs wnl       express: phrasesx3-5words    some full sents:     word-finding w/o pauses   w/o paraphasias   repeat:  complete w nl pace    name:  body parts=3/3   objects=5/5       read:  pace & rhythm wnl          w/o paralexias    Praxis:  intrans: hand shapes to model= 3/3   transitive: pen twirling  slow irreg Lt & Rt     Luria Seq Learn:  Rt on Lt: fails /p 3 demos            Percept:  visual: traffic signs=4/4,  imbedded objs (w/o clutter)=3/5 (2 errors)     tactile: palm graphesthesia (XLTOC)   Lt wnl      Rt wnl    Attention: visual:   Lt/Rt w/o DSSE   tactile: Lt/Rt hand contact w/o DSSE wnl     Memory: visspat:  room topology (door, pc, prev room) =3/3    remote: USholidays=3/3     Executive:  Color-name Stroop: names=0/9 errors   colors= 5/9 errors (2 self-corrected)   Calcs: simple subtract nl  carry subtract nl  simple multiplic nl       Testing MMSE=21/30, c/w mod impairment;  Mood: GDS=2/15 does not support mood disorder    Labs (to 12-4-2024):  wbc=6.5,    hct=39.6,    wft=632    A1c=6.5  Syphilis NR  ojt=671, bun/crt=21/0.90,  GFR=66,     AST=16  ALT=13  AlkP=58   evhl=217, hdl=63.7, nonHDL=77,  ldl=53, chol/hdl=2.2, vldl=24, IW=255   TSH=4.03à2.29     fT4=0.96 X1=406     NH3=14  UA (12-4-2024): Leuk: large+3àmod+2,  no signif growth  EKG (5-): NSR=62 VZiAk=855 Abnls: inverted Ts in lateral leads   Carotid Dup (6-): Rt Carotid:c/w <50% stenosis of Rt prox ICA. Rt ECA patent. Rt Vert is patent with antegrade flow w/o signif stenosis in the right subclavian artery.  Lt Carotid:<50% stenosis of Lt prox ICA. Patent left CCA/ICA patch noted. Lt ECA patent. Lt Vert patent with antegrade flow. Lt Subclav w/o hemodynamically signif stenosis.  (no change from 5-).  Imaging & Doppler:  Prox Rt ICA w/ heterogenous and calcified plaque.  Prox Rt ECA w/ heterogenous and calcified plaque.  Mid Rt CCA w/ calcified plaque. Distal Rt CCA w/ calcified plaque.  Prox Lt ICA w/ heterogenous and calcified plaque. Prox Lt  ECA w/ heterogenous and calcified plaque. Mid Lt CCA w/ calcified plaque. Distal Lt CCA w/ calcified plaque.  Brain MRI (4-1-2024):  Mod brain vol loss.  Scattered & more patchy and confluent nonspec  WM changes in cerebral hemisph bilat with ill-defined increased signal on FLAIR and T2   in brainstem which likely are sequelae of remote small-vessel ischemia. Additional punctate foci of bright signal on T2 in subinsular regions and basal ganglia bilat c/w mildly prominent perivasc spaces and/or small scattered remote lacunes.  An area of cortical encephalomalacia/gliosis is identified along the Lt  parietal lobe. Additional small foci of encephalomalacia in cerebellar hemispheres bilat.  GRE-T2 w/ foci of blooming dark signal in Lt parietal and lateral Rt frontal lobes c/w previous microhemorr/hemosid deposition and/or dystrophic calcific/mineraliz.

## 2025-01-10 DIAGNOSIS — I25.10 CORONARY ARTERY DISEASE INVOLVING NATIVE CORONARY ARTERY OF NATIVE HEART WITHOUT ANGINA PECTORIS: ICD-10-CM

## 2025-01-10 RX ORDER — EVOLOCUMAB 140 MG/ML
INJECTION, SOLUTION SUBCUTANEOUS
Qty: 6 ML | Refills: 3 | Status: SHIPPED | OUTPATIENT
Start: 2025-01-10

## 2025-01-14 DIAGNOSIS — G30.1 LATE ONSET ALZHEIMER'S DISEASE WITH BEHAVIORAL DISTURBANCE (MULTI): Primary | ICD-10-CM

## 2025-01-14 DIAGNOSIS — F02.818 LATE ONSET ALZHEIMER'S DISEASE WITH BEHAVIORAL DISTURBANCE (MULTI): Primary | ICD-10-CM

## 2025-01-14 RX ORDER — MEMANTINE HYDROCHLORIDE 5 MG/1
5 TABLET ORAL 2 TIMES DAILY
Qty: 120 TABLET | Refills: 2 | Status: SHIPPED | OUTPATIENT
Start: 2025-01-14 | End: 2026-01-14

## 2025-01-15 ENCOUNTER — HOSPITAL ENCOUNTER (OUTPATIENT)
Dept: RADIOLOGY | Facility: HOSPITAL | Age: 80
Discharge: HOME | End: 2025-01-15
Payer: MEDICARE

## 2025-01-15 DIAGNOSIS — F02.80 MIXED ALZHEIMER AND VASCULAR DEMENTIA: ICD-10-CM

## 2025-01-15 DIAGNOSIS — G30.9 MIXED ALZHEIMER AND VASCULAR DEMENTIA: ICD-10-CM

## 2025-01-15 DIAGNOSIS — F01.50 MIXED ALZHEIMER AND VASCULAR DEMENTIA: ICD-10-CM

## 2025-01-15 PROCEDURE — 78814 PET IMAGE W/CT LMTD: CPT | Mod: PET TUMOR INIT TX STRAT | Performed by: STUDENT IN AN ORGANIZED HEALTH CARE EDUCATION/TRAINING PROGRAM

## 2025-01-15 PROCEDURE — 78814 PET IMAGE W/CT LMTD: CPT | Mod: PI

## 2025-01-15 PROCEDURE — A9586 FLORBETAPIR F18: HCPCS | Performed by: PSYCHIATRY & NEUROLOGY

## 2025-01-15 PROCEDURE — 3430000001 HC RX 343 DIAGNOSTIC RADIOPHARMACEUTICALS: Performed by: PSYCHIATRY & NEUROLOGY

## 2025-01-15 RX ADMIN — FLORBETAPIR F 18 10.5 MILLICURIE: 51 INJECTION, SOLUTION INTRAVENOUS at 13:45

## 2025-01-20 DIAGNOSIS — I10 ESSENTIAL HYPERTENSION: ICD-10-CM

## 2025-01-23 RX ORDER — HYDRALAZINE HYDROCHLORIDE 50 MG/1
50 TABLET, FILM COATED ORAL 2 TIMES DAILY
Qty: 180 TABLET | Refills: 3 | Status: SHIPPED | OUTPATIENT
Start: 2025-01-23

## 2025-01-23 RX ORDER — AMLODIPINE BESYLATE 5 MG/1
5 TABLET ORAL DAILY
Qty: 90 TABLET | Refills: 3 | Status: SHIPPED | OUTPATIENT
Start: 2025-01-23

## 2025-01-23 RX ORDER — METOPROLOL SUCCINATE 100 MG/1
100 TABLET, EXTENDED RELEASE ORAL DAILY
Qty: 90 TABLET | Refills: 3 | Status: SHIPPED | OUTPATIENT
Start: 2025-01-23

## 2025-02-19 ENCOUNTER — APPOINTMENT (OUTPATIENT)
Dept: PRIMARY CARE | Facility: CLINIC | Age: 80
End: 2025-02-19
Payer: MEDICARE

## 2025-03-10 ENCOUNTER — OFFICE VISIT (OUTPATIENT)
Dept: NEUROLOGY | Facility: CLINIC | Age: 80
End: 2025-03-10
Payer: MEDICARE

## 2025-03-10 VITALS
HEART RATE: 55 BPM | SYSTOLIC BLOOD PRESSURE: 136 MMHG | BODY MASS INDEX: 21.08 KG/M2 | WEIGHT: 119 LBS | DIASTOLIC BLOOD PRESSURE: 62 MMHG | RESPIRATION RATE: 18 BRPM

## 2025-03-10 DIAGNOSIS — E11.9 TYPE 2 DIABETES MELLITUS WITHOUT COMPLICATION, WITHOUT LONG-TERM CURRENT USE OF INSULIN (MULTI): ICD-10-CM

## 2025-03-10 DIAGNOSIS — F01.A0 MILD VASCULAR DEMENTIA WITHOUT BEHAVIORAL DISTURBANCE, PSYCHOTIC DISTURBANCE, MOOD DISTURBANCE, OR ANXIETY: Primary | ICD-10-CM

## 2025-03-10 DIAGNOSIS — M54.42 CHRONIC BILATERAL LOW BACK PAIN WITH BILATERAL SCIATICA: ICD-10-CM

## 2025-03-10 DIAGNOSIS — G89.29 CHRONIC BILATERAL LOW BACK PAIN WITH BILATERAL SCIATICA: ICD-10-CM

## 2025-03-10 DIAGNOSIS — E78.5 DYSLIPIDEMIA: ICD-10-CM

## 2025-03-10 DIAGNOSIS — M54.41 CHRONIC BILATERAL LOW BACK PAIN WITH BILATERAL SCIATICA: ICD-10-CM

## 2025-03-10 DIAGNOSIS — I10 ESSENTIAL HYPERTENSION: ICD-10-CM

## 2025-03-10 PROCEDURE — 1159F MED LIST DOCD IN RCRD: CPT | Performed by: PSYCHIATRY & NEUROLOGY

## 2025-03-10 PROCEDURE — 1036F TOBACCO NON-USER: CPT | Performed by: PSYCHIATRY & NEUROLOGY

## 2025-03-10 PROCEDURE — 99215 OFFICE O/P EST HI 40 MIN: CPT | Performed by: PSYCHIATRY & NEUROLOGY

## 2025-03-10 PROCEDURE — 1126F AMNT PAIN NOTED NONE PRSNT: CPT | Performed by: PSYCHIATRY & NEUROLOGY

## 2025-03-10 PROCEDURE — 3078F DIAST BP <80 MM HG: CPT | Performed by: PSYCHIATRY & NEUROLOGY

## 2025-03-10 PROCEDURE — 3075F SYST BP GE 130 - 139MM HG: CPT | Performed by: PSYCHIATRY & NEUROLOGY

## 2025-03-10 PROCEDURE — G2211 COMPLEX E/M VISIT ADD ON: HCPCS | Performed by: PSYCHIATRY & NEUROLOGY

## 2025-03-10 ASSESSMENT — PAIN SCALES - GENERAL: PAINLEVEL_OUTOF10: 0-NO PAIN

## 2025-03-10 NOTE — PATIENT INSTRUCTIONS
"  Assessment/Plan   1. Mild vascular dementia without behavioral disturbance, psychotic disturbance, mood disturbance, or anxiety    2. Essential hypertension    3. Dyslipidemia    4. Type 2 diabetes mellitus without complication, without long-term current use of insulin (Multi)    5. Chronic bilateral low back pain with bilateral sciatica        PLAN     Reviewed vascular cause for memory concerns.  Continue memantine 5mg daily, can increase to 5mg twice daily if symptoms worsen.   Leg pain/ cramping suggest lumbar canal stenosis pattern due to your back condition.   Discussed driving evaluation if you can increase daily activity by using your exercise bike.     Brain Health- You can keep your brain healthy by following with your primary doctor to keep your medical conditions well controlled.    Bring your medicines to doctor visits to make sure they are right for you and you are not on too many or unnecessary medicines.  Use a weekly pill box to keep organized.    Antithrombotic \"blood thinner\" medication- recommend aspirin 81mg daily for stroke prevention, use of clopidogrel per your cardiologist recommendations after your heart stents  Blood pressure- Normal BP is <120/80 mmHg.  Blood Pressure : Goal is less than 130/80 mmHg  Cholesterol- Ideal lipid profile is an LDL-cholesterol <70 mg/dl, total cholesterol <200 mg/dl, fasting triglycerides < 150 mg/dl and HDL-cholesterol >55 mg/dl.   Blood sugar- Blood Sugar : Goal is normal fasting sugar between  mg/dl   Healthy physical activity- Goal is a moderate level of exercise at least 30 minutes/day, most days of the week.   Healthy weight- Goal is an ideal weight with a waistline <40\" for men or <35\" for women, and BMI of 18.5-25.   Healthy diet- is rich in vegetables, fruits, whole grains, legumes and fish, low in salt, and avoids red meats and processed/ refined foods.   Healthy sleep- is restorative, ~7 hours/night, with identification and treatment of " obstructive/ central sleep apnea that increases the risk of stroke and heart disease.   Smoking- Goal is to stop smoking any tobacco product and avoid second-hand smoke. For help to quit all forms of tobacco, call 4-961-QUIT-NOW (1-246.847.3165) to speak with a specialist at the [place] [facility].    Alcohol- Goal is moderation; no more than 2 servings for men and 1 serving for non-pregnant women.   Drug use- Goal is avoidance of illicit drugs that can cause blood pressure spikes and damage to blood vessels.   Stroke Warning Signs- know the symptoms of stroke and the importance of calling 911/EMS to access the quickest treatment.     Strategies to be your best- If you have cognitive difficulties, things you can do to minimize the impact in your life and compensate include:   Limit the use of any medications that can impair brain attention and memory, including pain medications and sedatives.   Get the most information from the world around you by having the best vision and hearing you can with glasses or hearing aids if needed.    Limit distractions when you are trying to concentrate and focus on one task at a time.    Save the complicated tasks for the time of day that you are most fresh and alert and take breaks as needed.   Have a routine in your day to bolster your memory and make sure you have a system to consistently put important items like keys/ phone in the same place.   Use calendars, notes, alarms and sticky notes to keep you on track.   Have a schedule of activities during the day to stay physically and socially active.  Keep yourself mentally active by socializing, reading, doing puzzles or hobbies. Try to challenge yourself by learning something new like a craft, dance, computer program.

## 2025-03-10 NOTE — PROGRESS NOTES
"   [facility]   Janine Hendrix is a 79 y.o. year old female presenting for neurologic evaluation.   Referred by: Tza Wilkes MD PhD  PCP: Jessica Burnette PA-C    1/2025- Neurology (Chung) for cognitive decline.  Rx memantine, intolerant of a prior medication with history of bradycardia.  MMSE 21-22/30  MRI- microvascular changes, volume loss. With sparse GE changes   Amyloid PET- negative brain amyloid imaging study     03/10/25- today here with daughter.  Main issue is forgetting names, appointments worse when alone, not socially stimulated .  Now family more involved with dinners and social events.  Not driving for 4-5 yrs since back surgery but would like to visit brother, small errands.  Sedentary- no safe place to walk, \"too lazy\" to use exercise bike at home.   Physically limited by leg cramps at night and with exercise, LCS pattern as she can walk well if leaning over shopping cart, not limited in sitting.    Vascular risk factors- HTN, DM- A1c 6.5%, HPL- LDL 53 on evolumab, CAD s/p PCI on DAPT,  PAD, carotid atherosclerosis without significant stenosis on DAPT, former smoker- stopped 2019    Extensive review of notes in EMR, labs, tests, Interpretation of neuroimaging  No CT head results found for the past 12 months  No CT Angio Head Results found for the past 14 days  No CT Angio Neck Results found for the past  year    MR brain wo IV contrast    Result Date: 4/2/2024  There is moderate brain parenchymal volume loss.   There are scattered as well as more patchy and confluent nonspecific white matter changes within the cerebral hemispheres bilaterally along with ill-defined increased signal on the FLAIR and T2 weighted images overlying the brainstem which while nonspecific, given the patient's age, likely represent sequelae of more remote small-vessel ischemic change. Additional punctate foci of bright signal on the T2 images are identified within the " subinsular regions and basal ganglia bilaterally suggesting incidental mildly prominent perivascular spaces and/or small scattered more remote lacunar infarctions.   An area of cortical encephalomalacia/gliosis is identified along the left parietal lobe. Additional small foci of encephalomalacia are identified within the cerebellar hemispheres bilaterally.   The gradient echo T2 weighted images demonstrate punctate foci of blooming dark signal within the left parietal lobe and lateral right frontal lobe suggesting small foci of previous microhemorrhage/hemosiderin deposition and/or dystrophic calcification/mineralization.     MACRO: None.   Signed by: Ricky Camarillo 4/2/2024 7:06 AM Dictation workstation:   [id number]     No EEG results found for the past 12 months    No results found for this or any previous visit (from the past 4464 hours).  No echocardiogram results found for the past 12 months     Lab Results   Component Value Date    CHOL 141 03/13/2024    TRIG 122 03/13/2024    HDL 63.7 03/13/2024    CHHDL 2.2 03/13/2024    LDLF 48 03/20/2023    VLDL 24 03/13/2024    NHDL 77 03/13/2024     Lab Results   Component Value Date     (H) 10/05/2020    HGBA1C 6.5 05/24/2024     Lab Results   Component Value Date    GLUCOSE 129 (H) 03/13/2024     03/13/2024    K 4.6 03/13/2024     03/13/2024    CO2 28 03/13/2024    ANIONGAP 16 03/13/2024    BUN 21 03/13/2024    CREATININE 0.90 03/13/2024    CALCIUM 9.3 03/13/2024    PHOS 3.2 12/08/2021    ALBUMIN 3.9 03/13/2024     Lab Results   Component Value Date    CALCIUM 9.3 03/13/2024    PHOS 3.2 12/08/2021    PROT 6.5 03/13/2024    ALBUMIN 3.9 03/13/2024    AST 16 03/13/2024    ALT 13 03/13/2024    ALKPHOS 58 03/13/2024    BILITOT 0.4 03/13/2024     Lab Results   Component Value Date    WBC 6.5 03/13/2024    HGB 13.0 03/13/2024    HCT 39.6 03/13/2024    MCV 91 03/13/2024     03/13/2024     INR   Date Value Ref Range Status   05/06/2022 1.1 0.9 - 1.1  Final     Lab Results   Component Value Date    TSH 2.29 04/18/2024       Relevant ROS, Problem list, Past Medical/ Surgical/ Family/ Social history- reviewed and pertinent details noted in history.     Objective     Visit Vitals  /62 (BP Location: Right arm, Patient Position: Sitting, BP Cuff Size: Small adult)   Pulse 55   Resp 18   Wt 54 kg (119 lb)   BMI 21.08 kg/m²   OB Status Postmenopausal   Smoking Status Former   BSA 1.55 m²       Neuro Scores/ Scales:   Modified [place] (mRS) Modified [place] Score: No symptoms.       Physical Exam: General appearance: no acute distress.    Neurological Exam:  Mental status: The patient was alert, interactive and cooperative with an appropriate affect.  Speech is clear.  Language intact for comprehension, expression, and vocabulary.  Fund of knowledge limited, turns to daughter for details.   Cranial Nerves- OD ptosis, normal facial animation.   Motor- No abnormal or adventitious movements were noted.  Muscle strength- mild L deltoid weakness without drift, good hand intrinsic strength, LE 5/5 briefly with poor endurance.    Pushes off to stand, ambulates independently without spasticity or ataxia.    DTR UE 1/1 biceps, triceps, BR, K 1/1 ankles 0/0.  Normal tone. Negative SLR.     Assessment/Plan   1. Mild vascular dementia without behavioral disturbance, psychotic disturbance, mood disturbance, or anxiety    2. Essential hypertension    3. Dyslipidemia    4. Type 2 diabetes mellitus without complication, without long-term current use of insulin (Multi)    5. Chronic bilateral low back pain with bilateral sciatica        PLAN   Reviewed vascular cause for memory concerns.  Continue memantine 5mg daily, can increase to 5mg twice daily if symptoms worsen.   Leg pain/ cramping suggest lumbar canal stenosis pattern due to your back condition.   Discussed driving evaluation if you can increase daily activity by using your exercise bike.     Brain Health- You can keep your  "brain healthy by following with your primary doctor to keep your medical conditions well controlled.    Bring your medicines to doctor visits to make sure they are right for you and you are not on too many or unnecessary medicines.  Use a weekly pill box to keep organized.    Antithrombotic \"blood thinner\" medication- recommend aspirin 81mg daily for stroke prevention, use of clopidogrel per your cardiologist recommendations after your heart stents  Blood pressure- Normal BP is <120/80 mmHg.  Blood Pressure : Goal is less than 130/80 mmHg  Cholesterol- Ideal lipid profile is an LDL-cholesterol <70 mg/dl, total cholesterol <200 mg/dl, fasting triglycerides < 150 mg/dl and HDL-cholesterol >55 mg/dl.   Blood sugar- Blood Sugar : Goal is normal fasting sugar between  mg/dl   Healthy physical activity- Goal is a moderate level of exercise at least 30 minutes/day, most days of the week.   Healthy weight- Goal is an ideal weight with a waistline <40\" for men or <35\" for women, and BMI of 18.5-25.   Healthy diet- is rich in vegetables, fruits, whole grains, legumes and fish, low in salt, and avoids red meats and processed/ refined foods.   Healthy sleep- is restorative, ~7 hours/night, with identification and treatment of obstructive/ central sleep apnea that increases the risk of stroke and heart disease.   Smoking- Goal is to stop smoking any tobacco product and avoid second-hand smoke. For help to quit all forms of tobacco, call 1-567-QUIT-NOW (1-775.636.2721) to speak with a specialist at the [place] [facility].    Alcohol- Goal is moderation; no more than 2 servings for men and 1 serving for non-pregnant women.   Drug use- Goal is avoidance of illicit drugs that can cause blood pressure spikes and damage to blood vessels.   Stroke Warning Signs- know the symptoms of stroke and the importance of calling 911/EMS to access the quickest treatment.     Strategies to be your best- If you have cognitive difficulties, things " you can do to minimize the impact in your life and compensate include:   Limit the use of any medications that can impair brain attention and memory, including pain medications and sedatives.   Get the most information from the world around you by having the best vision and hearing you can with glasses or hearing aids if needed.    Limit distractions when you are trying to concentrate and focus on one task at a time.    Save the complicated tasks for the time of day that you are most fresh and alert and take breaks as needed.   Have a routine in your day to bolster your memory and make sure you have a system to consistently put important items like keys/ phone in the same place.   Use calendars, notes, alarms and sticky notes to keep you on track.   Have a schedule of activities during the day to stay physically and socially active.  Keep yourself mentally active by socializing, reading, doing puzzles or hobbies. Try to challenge yourself by learning something new like a craft, dance, computer program.         No orders of the defined types were placed in this encounter.

## 2025-03-12 ENCOUNTER — APPOINTMENT (OUTPATIENT)
Dept: PULMONOLOGY | Facility: CLINIC | Age: 80
End: 2025-03-12
Payer: MEDICARE

## 2025-03-12 ENCOUNTER — HOSPITAL ENCOUNTER (OUTPATIENT)
Dept: RADIOLOGY | Facility: HOSPITAL | Age: 80
Discharge: HOME | End: 2025-03-12
Payer: MEDICARE

## 2025-03-12 DIAGNOSIS — R91.1 LUNG NODULE: ICD-10-CM

## 2025-03-12 PROCEDURE — 71250 CT THORAX DX C-: CPT | Performed by: RADIOLOGY

## 2025-03-12 PROCEDURE — 71250 CT THORAX DX C-: CPT

## 2025-03-14 DIAGNOSIS — R91.1 PULMONARY NODULE: Primary | ICD-10-CM

## 2025-03-18 ENCOUNTER — APPOINTMENT (OUTPATIENT)
Dept: PRIMARY CARE | Facility: CLINIC | Age: 80
End: 2025-03-18
Payer: MEDICARE

## 2025-03-18 VITALS
BODY MASS INDEX: 21.4 KG/M2 | HEIGHT: 63 IN | OXYGEN SATURATION: 96 % | TEMPERATURE: 97.1 F | DIASTOLIC BLOOD PRESSURE: 74 MMHG | WEIGHT: 120.8 LBS | HEART RATE: 69 BPM | SYSTOLIC BLOOD PRESSURE: 118 MMHG

## 2025-03-18 DIAGNOSIS — E55.9 VITAMIN D INSUFFICIENCY: ICD-10-CM

## 2025-03-18 DIAGNOSIS — I73.9 PAD (PERIPHERAL ARTERY DISEASE) (CMS-HCC): ICD-10-CM

## 2025-03-18 DIAGNOSIS — F03.90 DEMENTIA WITHOUT BEHAVIORAL DISTURBANCE (MULTI): ICD-10-CM

## 2025-03-18 DIAGNOSIS — E11.9 TYPE 2 DIABETES MELLITUS WITHOUT COMPLICATION, WITHOUT LONG-TERM CURRENT USE OF INSULIN (MULTI): ICD-10-CM

## 2025-03-18 DIAGNOSIS — I73.9 RIGHT LEG CLAUDICATION (CMS-HCC): ICD-10-CM

## 2025-03-18 DIAGNOSIS — Z00.00 MEDICARE ANNUAL WELLNESS VISIT, SUBSEQUENT: Primary | ICD-10-CM

## 2025-03-18 DIAGNOSIS — E78.5 BORDERLINE HYPERLIPIDEMIA: ICD-10-CM

## 2025-03-18 DIAGNOSIS — J44.9 CHRONIC OBSTRUCTIVE PULMONARY DISEASE, UNSPECIFIED COPD TYPE (MULTI): ICD-10-CM

## 2025-03-18 DIAGNOSIS — H61.23 BILATERAL IMPACTED CERUMEN: ICD-10-CM

## 2025-03-18 LAB — POC HEMOGLOBIN A1C: 6.8 % (ref 4.2–6.5)

## 2025-03-18 PROCEDURE — 1170F FXNL STATUS ASSESSED: CPT | Performed by: PHYSICIAN ASSISTANT

## 2025-03-18 PROCEDURE — 3074F SYST BP LT 130 MM HG: CPT | Performed by: PHYSICIAN ASSISTANT

## 2025-03-18 PROCEDURE — 3078F DIAST BP <80 MM HG: CPT | Performed by: PHYSICIAN ASSISTANT

## 2025-03-18 PROCEDURE — 1159F MED LIST DOCD IN RCRD: CPT | Performed by: PHYSICIAN ASSISTANT

## 2025-03-18 PROCEDURE — G0439 PPPS, SUBSEQ VISIT: HCPCS | Performed by: PHYSICIAN ASSISTANT

## 2025-03-18 PROCEDURE — 83036 HEMOGLOBIN GLYCOSYLATED A1C: CPT | Performed by: PHYSICIAN ASSISTANT

## 2025-03-18 PROCEDURE — 1160F RVW MEDS BY RX/DR IN RCRD: CPT | Performed by: PHYSICIAN ASSISTANT

## 2025-03-18 PROCEDURE — 1036F TOBACCO NON-USER: CPT | Performed by: PHYSICIAN ASSISTANT

## 2025-03-18 ASSESSMENT — ACTIVITIES OF DAILY LIVING (ADL)
DRESSING: INDEPENDENT
BATHING: INDEPENDENT
TAKING_MEDICATION: INDEPENDENT
DOING_HOUSEWORK: INDEPENDENT
MANAGING_FINANCES: INDEPENDENT
GROCERY_SHOPPING: NEEDS ASSISTANCE

## 2025-03-18 ASSESSMENT — PATIENT HEALTH QUESTIONNAIRE - PHQ9
1. LITTLE INTEREST OR PLEASURE IN DOING THINGS: SEVERAL DAYS
2. FEELING DOWN, DEPRESSED OR HOPELESS: SEVERAL DAYS
SUM OF ALL RESPONSES TO PHQ9 QUESTIONS 1 AND 2: 2
10. IF YOU CHECKED OFF ANY PROBLEMS, HOW DIFFICULT HAVE THESE PROBLEMS MADE IT FOR YOU TO DO YOUR WORK, TAKE CARE OF THINGS AT HOME, OR GET ALONG WITH OTHER PEOPLE: NOT DIFFICULT AT ALL

## 2025-03-18 NOTE — PROGRESS NOTES
"Subjective   HPI   Janine Hendrix is a 79 y.o. year old female patient with presenting to clinic with concern for Medicare Visit     Chief Complaint   Patient presents with    Medicare Annual Wellness Visit Subsequent     Bilateral ear pain.        HTN  -Imdur  -hydralazine  -metoprolol  -amlodipine  BP Readings from Last 5 Encounters:   03/18/25 118/74   03/10/25 136/62   01/07/25 143/70   08/20/24 128/59   08/19/24 124/52     HLD  -Repatha    Severe multi vessel CAD hx PCI & stent  Dr Escalante  -ASA  -plavix  Bilat Carotid Stenosis  PAD  RLE claudication    DM2  -metformin  bid  Lab Results   Component Value Date    HGBA1C 6.8 (A) 03/18/2025   ASA  No ace/arb  Statin- none, but on repatha    GERD  Zenker's diverticulum    Anxiety & Depression, pt reports that mental health is \"good\"  -celexa  Frustration, confusion, stressed from \"busy\" house around her, loss of driving independence.    Dementia  Dr Wilkes  -Namenda 5mg daily  MRI- microvascular changes, volume loss. With sparse GE changes   Amyloid PET- negative brain amyloid imaging study   Should not drive anymore. Hasn't driven in months NeuroPsych evaluating to see if she is safe to drive.    Insomnia  Sleeps well most of the time. Avoiding naps.    CT chest  IMPRESSION:  1.  Stable size of ground-glass nodules within the right middle lobe  and right upper lobe as described above, although these have  increased in density when compared to most recent prior exam dated  07/31/2024. Findings remain indeterminate, although slow growing  neoplasms can not be excluded.  2. Mild interval increase in size of a few mediastinal lymph nodes as  described above which are also indeterminate. Findings may be  reactive in the etiology, consider short-term CT chest follow-up in 3 months for re-evaluation.  CT follow up rodered for june  Breast cancer  Mastectomy Left Breast     Lumbar laminectomy syndrome, Spondolisthesis  Dr Rodriguez   -physical therapy " recommended  stable L3-S1 laminectomy and fusion  chronic low back pain secondary to prior multilevel lumbar laminectomy and fusion.     Hx vertigo   -meclizine PRN    Cricopharyngeal Dysphagia  2020, Aspiration scale 2. Cleared. Does not enter airway without clear cause.     Patient Care Team:  Jessica Burnette PA-C as PCP - General (Family Medicine)  Jessica Burnette PA-C as PCP - [facility] Attributed Provider     Specialists    Past Medical, Surgical, and Family History reviewed and updated in chart.    Reviewed all medications by prescribing practitioner or clinical pharmacist (such as prescriptions, OTCs, herbal therapies and supplements) and documented in the medical record.     Preventative Health   Health Maintenance Due   Topic Date Due    Diabetic Eye Exam  11/18/2020    Urine Protein Check  02/21/2025    Lipid (cholesterol) test  03/13/2025        There are no preventive care reminders to display for this patient.     Immunizations Reviewed  Immunization History   Administered Date(s) Administered    Flu vaccine, quadrivalent, high-dose, preservative free, age 65y+ (FLUZONE) 10/01/2020, 09/27/2023    Flu vaccine, trivalent, preservative free, HIGH-DOSE, age 65y+ (Fluzone) 10/14/2014, 11/06/2015, 10/12/2016, 09/29/2017, 11/15/2018, 09/27/2019, 11/01/2024    Influenza, Unspecified 10/24/2013    Influenza, seasonal, injectable 10/10/2022    Moderna SARS-CoV-2 Vaccination 02/20/2021, 03/20/2021, 04/08/2022    Pneumococcal conjugate vaccine, 13-valent (PREVNAR 13) 09/16/2019    Pneumococcal polysaccharide vaccine, 23-valent, age 2 years and older (PNEUMOVAX 23) 09/29/2017    RESPIRATORY SYNCYTIAL VIRUS (RSV), ELIGIBLE PREGNANT PTS, 0.5 ML (ABRYSVO) 11/01/2024    Tdap vaccine, age 7 year and older (BOOSTRIX, ADACEL) 10/28/2016        Problem List Reviewed  Patient Active Problem List   Diagnosis    Abnormal skin growth    Anxiety with depression    Bilateral carotid artery stenosis    BPPV (benign paroxysmal  positional vertigo)    Chronic fatigue and malaise    Chronic low back pain    Chronic right hip pain    Coronary artery disease involving native coronary artery of native heart without angina pectoris    Dementia without behavioral disturbance (Multi)    DM2 (diabetes mellitus, type 2) (Multi)    Dyslipidemia    Dysphagia, cricopharyngeal    Essential hypertension    GERD (gastroesophageal reflux disease)    History of heart artery stent    History of total mastectomy of left breast    Hypokalemia    Hypomagnesemia    Injury of right common femoral artery    Intraoperative complication involving circulatory system associated with cardiac catheterization    Oropharyngeal dysphagia    PAD (peripheral artery disease) (CMS-HCC)    Pain of left upper extremity    Pain of right upper extremity    Panic attacks    Pernicious anemia    Right leg claudication (CMS-HCC)    SCC (squamous cell carcinoma)    Type 2 diabetes mellitus without complication, without long-term current use of insulin (Multi)    Vitamin D deficiency    Zenker's diverticulum    Allergic rhinitis    Insomnia    Other specified diseases of spinal cord    History of breast cancer    Mild vascular dementia without behavioral disturbance, psychotic disturbance, mood disturbance, or anxiety       Past Medical History:   Diagnosis Date    Aspiration pneumonia (Multi) 04/04/2023    DM2 (diabetes mellitus, type 2) (Multi)     Dyslipidemia     Encounter for other preprocedural examination 10/19/2021    Pre-operative clearance    GERD (gastroesophageal reflux disease)     HTN (hypertension), benign     Pain in left hip 06/18/2020    Left hip pain    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      Past Surgical History:   Procedure Laterality Date    BREAST BIOPSY Right     CT ABDOMEN PELVIS ANGIOGRAM W AND/OR WO IV CONTRAST  10/28/2020    CT ABDOMEN PELVIS ANGIOGRAM W AND/OR WO IV CONTRAST 10/28/2020 [facility]    MASTECTOMY Left 1999     "malignant    OTHER SURGICAL HISTORY  2020    Vascular surgical procedure    OTHER SURGICAL HISTORY  2020    Cardiac catheterization      Family History   Problem Relation Name Age of Onset    Breast cancer Mother      No Known Problems Father      No Known Problems Other        Social History     Tobacco Use    Smoking status: Former     Current packs/day: 0.00     Average packs/day: 0.5 packs/day for 5.0 years (2.5 ttl pk-yrs)     Types: Cigarettes     Start date:      Quit date:      Years since quittin.2    Smokeless tobacco: Never   Substance Use Topics    Alcohol use: Yes     Comment: rare        Medications Reviewed  Current Outpatient Medications on File Prior to Visit   Medication Sig Dispense Refill    albuterol 90 mcg/actuation inhaler INHALE 2 INHALATIONS BY MOUTH  EVERY 6 HOURS IF NEEDED FOR  SHORTNESS OF BREATH 17 g 2    amLODIPine (Norvasc) 5 mg tablet TAKE 1 TABLET BY MOUTH DAILY 90 tablet 3    aspirin 81 mg EC tablet Take 1 tablet (81 mg) by mouth once daily.      BD Luer-Kailah Syringe 3 mL 22 x 1 1/2\" syringe 1 every 2 weeks      blood sugar diagnostic (True Metrix Glucose Test Strip) strip 1 strip 3 times a day. 300 strip 3    blood-glucose meter (True Metrix Glucose Meter) misc       citalopram (CeleXA) 10 mg tablet Take 1 tablet (10 mg) by mouth once daily. 90 tablet 1    clopidogrel (Plavix) 75 mg tablet Take 1 tablet (75 mg) by mouth once daily. 90 tablet 3    famotidine (Pepcid) 20 mg tablet Take 1 tablet (20 mg) by mouth 2 times a day. 180 tablet 3    FreeStyle Test strip Test twice daily as directed 100 strip 1    hydrALAZINE (Apresoline) 50 mg tablet TAKE 1 TABLET BY MOUTH TWICE  DAILY 180 tablet 3    ipratropium-albuteroL (Duo-Neb) 0.5-2.5 mg/3 mL nebulizer solution Take 3 mL by nebulization 4 times a day as needed for wheezing or shortness of breath. 90 mL 0    isosorbide mononitrate ER (Imdur) 30 mg 24 hr tablet Take 1 tablet (30 mg) by mouth once daily. 90 tablet 3 " "   memantine (Namenda) 5 mg tablet Take 1 tablet (5 mg) by mouth 2 times a day. 120 tablet 2    metFORMIN  mg 24 hr tablet TAKE 1 TABLET BY MOUTH TWICE  DAILY WITH MEALS 180 tablet 3    metoprolol succinate XL (Toprol-XL) 100 mg 24 hr tablet TAKE 1 TABLET BY MOUTH DAILY 90 tablet 3    nitroglycerin (Nitrostat) 0.4 mg SL tablet Place 1 tablet (0.4 mg) under the tongue every 5 minutes if needed for chest pain. 30 tablet 0    pantoprazole (ProtoNix) 40 mg EC tablet TAKE TAKE 1 TABLET BY MOUTH  TWICE DAILY FOR 60 DAYS, THEN 1  TABLET ONCE DAILY IN THE MORNING BEFORE MEALS THEREAFTER DO NOT  CRUSH, CHEW, OR SPLIT 150 tablet 3    Repatha SureClick 140 mg/mL injection INJECT 140 MG (1 ML)  SUBCUTANEOUSLY EVERY 14 DAYS DO  NOT START BEFORE MARCH 22, 2024 6 mL 3    sodium chloride (Heron 128) 5 % ophthalmic solution 2 drops.      syringe, disposable, (Carepoint Luer Slip Syringe) 1 mL syringe        No current facility-administered medications on file prior to visit.        Review of Systems  Constitutional: Denies fever  HEENT: Denies ST, earache  CVS: Denies Chest pain  Pulmonary: Denies wheezing, SOB  GI: Denies N/V  : Denies dysuria  Musculoskeletal:  Denies myalgia  Neuro: Denies focal weakness or numbness.  Skin: Denies Rashes.  *Review of Systems is negative unless otherwise mentioned in HPI or ROS above.      @[facility]  /74   Pulse 69   Temp 36.2 °C (97.1 °F)   Ht 1.6 m (5' 3\")   Wt 54.8 kg (120 lb 12.8 oz)   SpO2 96%   BMI 21.40 kg/m²  reviewed Body mass index is 21.4 kg/m².     Physical Exam  Constitutional: NAD. Afebrile. Resting comfortably.  ENT: Nasal mucosa and oropharynx: moist oral mucosa. Posterior oropharynx nonerythematous. No posterior pharyngeal streaking.  Eyes: PERRLA. EOM intact. No vertical or circular nystagmus.  Ears, bilat cerumen impaction  Lymph: No anterior cervical chain or submandibular lymphadenopathy. No sentinel lymph nodes.  Cardiac: Regular rate & rhythm. No " murmur, gallops, or rubs.  Pulmonary: Lungs clear to auscultation bilaterally with good aeration. No wheezes, rhonchi, or rales. Normal WOB.  GI: Soft, Nontender, nondistended. No guarding. Normal BS x4.  : No suprapubic tenderness. No CVA tenderness to percussion.   Musculoskeletal: No peripheral edema.   Skin: No evidence of trauma. No rashes  Neuro: No focal neuro deficits. Normal gait without assistive devices.  Psych: Intact judgement and insight.    MDM        .Assessment/Plan   Problem List Items Addressed This Visit             ICD-10-CM    Dementia without behavioral disturbance (Multi) F03.90    DM2 (diabetes mellitus, type 2) (Multi) - Primary E11.9    Relevant Orders    POCT glycosylated hemoglobin (Hb A1C) manually resulted (Completed)    PAD (peripheral artery disease) (CMS-HCC) I73.9    Right leg claudication (CMS-HCC) I73.9     Other Visit Diagnoses         Codes    Borderline hyperlipidemia     E78.5    Relevant Orders    CBC    Comprehensive Metabolic Panel    TSH with reflex to Free T4 if abnormal    Lipid Panel    Vitamin D insufficiency     E55.9    Relevant Orders    Vitamin D 25-Hydroxy,Total (for eval of Vitamin D levels)    Medicare annual wellness visit, subsequent     Z00.00    Chronic obstructive pulmonary disease, unspecified COPD type (Multi)     J44.9    Bilateral impacted cerumen     H61.23

## 2025-03-18 NOTE — PATIENT INSTRUCTIONS
6/14/25 CT chest due to recheck chest lymphnodes and lung nodules    Blood work due- fasting 12 hours- BLACK coffee and water are ok.

## 2025-03-20 DIAGNOSIS — R07.9 CHEST PAIN, UNSPECIFIED TYPE: ICD-10-CM

## 2025-03-20 DIAGNOSIS — K21.9 GASTROESOPHAGEAL REFLUX DISEASE, UNSPECIFIED WHETHER ESOPHAGITIS PRESENT: ICD-10-CM

## 2025-03-20 LAB
ALBUMIN/CREAT UR: 14 MG/G CREAT
CREAT UR-MCNC: 72 MG/DL (ref 20–275)
MICROALBUMIN UR-MCNC: 1 MG/DL

## 2025-03-20 RX ORDER — ISOSORBIDE MONONITRATE 30 MG/1
30 TABLET, EXTENDED RELEASE ORAL DAILY
Qty: 90 TABLET | Refills: 3 | OUTPATIENT
Start: 2025-03-20 | End: 2026-03-20

## 2025-03-20 RX ORDER — FAMOTIDINE 20 MG/1
20 TABLET, FILM COATED ORAL 2 TIMES DAILY
Qty: 180 TABLET | Refills: 3 | OUTPATIENT
Start: 2025-03-20

## 2025-03-25 LAB
25(OH)D3+25(OH)D2 SERPL-MCNC: 36 NG/ML (ref 30–100)
ALBUMIN SERPL-MCNC: 4.1 G/DL (ref 3.6–5.1)
ALP SERPL-CCNC: 64 U/L (ref 37–153)
ALT SERPL-CCNC: 11 U/L (ref 6–29)
ANION GAP SERPL CALCULATED.4IONS-SCNC: 10 MMOL/L (CALC) (ref 7–17)
AST SERPL-CCNC: 14 U/L (ref 10–35)
BILIRUB SERPL-MCNC: 0.6 MG/DL (ref 0.2–1.2)
BUN SERPL-MCNC: 27 MG/DL (ref 7–25)
CALCIUM SERPL-MCNC: 9.1 MG/DL (ref 8.6–10.4)
CHLORIDE SERPL-SCNC: 99 MMOL/L (ref 98–110)
CHOLEST SERPL-MCNC: 128 MG/DL
CHOLEST/HDLC SERPL: 1.9 (CALC)
CO2 SERPL-SCNC: 28 MMOL/L (ref 20–32)
CREAT SERPL-MCNC: 0.81 MG/DL (ref 0.6–1)
EGFRCR SERPLBLD CKD-EPI 2021: 74 ML/MIN/1.73M2
ERYTHROCYTE [DISTWIDTH] IN BLOOD BY AUTOMATED COUNT: 12.9 % (ref 11–15)
GLUCOSE SERPL-MCNC: 169 MG/DL (ref 65–99)
HCT VFR BLD AUTO: 40.8 % (ref 35–45)
HDLC SERPL-MCNC: 66 MG/DL
HGB BLD-MCNC: 13.5 G/DL (ref 11.7–15.5)
LDLC SERPL CALC-MCNC: 41 MG/DL (CALC)
MCH RBC QN AUTO: 29.9 PG (ref 27–33)
MCHC RBC AUTO-ENTMCNC: 33.1 G/DL (ref 32–36)
MCV RBC AUTO: 90.3 FL (ref 80–100)
NONHDLC SERPL-MCNC: 62 MG/DL (CALC)
PLATELET # BLD AUTO: 212 THOUSAND/UL (ref 140–400)
PMV BLD REES-ECKER: 10.4 FL (ref 7.5–12.5)
POTASSIUM SERPL-SCNC: 4 MMOL/L (ref 3.5–5.3)
PROT SERPL-MCNC: 6.6 G/DL (ref 6.1–8.1)
RBC # BLD AUTO: 4.52 MILLION/UL (ref 3.8–5.1)
SODIUM SERPL-SCNC: 137 MMOL/L (ref 135–146)
TRIGL SERPL-MCNC: 126 MG/DL
TSH SERPL-ACNC: 1.7 MIU/L (ref 0.4–4.5)
WBC # BLD AUTO: 10 THOUSAND/UL (ref 3.8–10.8)

## 2025-03-26 ENCOUNTER — APPOINTMENT (OUTPATIENT)
Dept: PULMONOLOGY | Facility: CLINIC | Age: 80
End: 2025-03-26
Payer: MEDICARE

## 2025-03-26 VITALS
BODY MASS INDEX: 20.97 KG/M2 | DIASTOLIC BLOOD PRESSURE: 76 MMHG | HEART RATE: 99 BPM | SYSTOLIC BLOOD PRESSURE: 138 MMHG | WEIGHT: 118.4 LBS | OXYGEN SATURATION: 91 %

## 2025-03-26 DIAGNOSIS — R91.8 LUNG NODULES: ICD-10-CM

## 2025-03-26 DIAGNOSIS — J30.9 ALLERGIC RHINITIS, UNSPECIFIED SEASONALITY, UNSPECIFIED TRIGGER: Primary | ICD-10-CM

## 2025-03-26 PROCEDURE — 3075F SYST BP GE 130 - 139MM HG: CPT | Performed by: PEDIATRICS

## 2025-03-26 PROCEDURE — 1036F TOBACCO NON-USER: CPT | Performed by: PEDIATRICS

## 2025-03-26 PROCEDURE — 1159F MED LIST DOCD IN RCRD: CPT | Performed by: PEDIATRICS

## 2025-03-26 PROCEDURE — 99215 OFFICE O/P EST HI 40 MIN: CPT | Performed by: PEDIATRICS

## 2025-03-26 PROCEDURE — 3078F DIAST BP <80 MM HG: CPT | Performed by: PEDIATRICS

## 2025-03-26 PROCEDURE — 1160F RVW MEDS BY RX/DR IN RCRD: CPT | Performed by: PEDIATRICS

## 2025-03-26 RX ORDER — CALCIUM CARBONATE 300MG(750)
400 TABLET,CHEWABLE ORAL DAILY
COMMUNITY

## 2025-03-26 ASSESSMENT — PATIENT HEALTH QUESTIONNAIRE - PHQ9
2. FEELING DOWN, DEPRESSED OR HOPELESS: NOT AT ALL
SUM OF ALL RESPONSES TO PHQ9 QUESTIONS 1 AND 2: 0
1. LITTLE INTEREST OR PLEASURE IN DOING THINGS: NOT AT ALL

## 2025-03-26 NOTE — PROGRESS NOTES
"Subjective   Patient ID: Janien Hendrix \"Neeta\" is a 79 y.o. female who presents for lung nodules    HPI    3/26/2025:  Ms Hendrix is about the same.  Allergies have been off and on.  She only occasionally needs albuterol.  She had a repeat CT that shows stable GGO but a slight increase in a paratracheal lymph node 12mm was 9mm    8/14/2024:  Ms Hendrix is doing well.  She has some allergic rhinitis currently that is causing some phlegm in her throat.  She rarely uses albuterol or duoneb.  She had a repeat CT that shows stability of the RML ground glass areas.     Initial visit 7/10/2024:  Ms Hendrix is a 78yr old  with no significant past pulmonary issues that had a chest CT in February.  This shows a new 14-15 ground glass area in the RML that was new compared to prior CT in 2021.  It has the appearance of inflammation but per radiology malignancy cannot be ruled out.    She does have some allergic rhinitis and phlegm that is bothersome.     She is a former smoker, 1/2 - 1ppd starting at age 16, she quit for the most part in 1997 but for several more years would on occasion smoke she completely quit at around age 71.    Review of Systems    See scanned documents attached to this note for review of systems, and appropriate scales/scores for this visit.     Objective   Physical Exam  Constitutional:       Appearance: Normal appearance.   HENT:      Head: Normocephalic and atraumatic.      Mouth/Throat:      Pharynx: Oropharynx is clear.   Cardiovascular:      Rate and Rhythm: Normal rate and regular rhythm.      Pulses: Normal pulses.      Heart sounds: Normal heart sounds.   Pulmonary:      Effort: Pulmonary effort is normal.      Breath sounds: Normal breath sounds. No wheezing, rhonchi or rales.   Abdominal:      General: Bowel sounds are normal.      Palpations: Abdomen is soft.   Musculoskeletal:         General: Normal range of motion.   Skin:     General: Skin is warm and dry.   Neurological:      General: " No focal deficit present.      Mental Status: She is alert and oriented to person, place, and time.   Psychiatric:         Mood and Affect: Mood normal.       Assessment/Plan       79yr old with GGO lung nodule     New lung nodule:  likely inflammatory but cannot rule out malignancy in high risk patient  - GGO areas stable  - will get repeat CT in 6 months  3/26/2025: Ground glass is stable.  There is a paratracheal lymph node that is 12mm (was 9mm).  Likely reactive. CT in 3 months (PCP already ordered)     2. Tobacco use:  no symptoms  - no treatment at this point.  Would consider PFT and starting  LAMA/LABA if symptoms increase       Follow up 6 month or sooner if needed       Michael Joy MD 03/26/25 8:48 AM

## 2025-03-30 DIAGNOSIS — F33.1 MODERATE EPISODE OF RECURRENT MAJOR DEPRESSIVE DISORDER: ICD-10-CM

## 2025-03-31 RX ORDER — CITALOPRAM 10 MG/1
10 TABLET ORAL DAILY
Qty: 90 TABLET | Refills: 1 | Status: SHIPPED | OUTPATIENT
Start: 2025-03-31

## 2025-04-03 DIAGNOSIS — K21.9 GASTROESOPHAGEAL REFLUX DISEASE, UNSPECIFIED WHETHER ESOPHAGITIS PRESENT: ICD-10-CM

## 2025-04-03 DIAGNOSIS — R07.9 CHEST PAIN, UNSPECIFIED TYPE: ICD-10-CM

## 2025-04-04 RX ORDER — FAMOTIDINE 20 MG/1
20 TABLET, FILM COATED ORAL 2 TIMES DAILY
Qty: 180 TABLET | Refills: 3 | OUTPATIENT
Start: 2025-04-04

## 2025-04-04 RX ORDER — ISOSORBIDE MONONITRATE 30 MG/1
30 TABLET, EXTENDED RELEASE ORAL DAILY
Qty: 90 TABLET | Refills: 3 | OUTPATIENT
Start: 2025-04-04 | End: 2026-04-04

## 2025-04-27 DIAGNOSIS — K21.9 GASTROESOPHAGEAL REFLUX DISEASE, UNSPECIFIED WHETHER ESOPHAGITIS PRESENT: ICD-10-CM

## 2025-04-28 RX ORDER — FAMOTIDINE 20 MG/1
20 TABLET, FILM COATED ORAL 2 TIMES DAILY
Qty: 60 TABLET | Refills: 0 | Status: SHIPPED | OUTPATIENT
Start: 2025-04-28

## 2025-05-13 DIAGNOSIS — E11.9 TYPE 2 DIABETES MELLITUS WITHOUT COMPLICATION, UNSPECIFIED WHETHER LONG TERM INSULIN USE: ICD-10-CM

## 2025-05-13 RX ORDER — CALCIUM CITRATE/VITAMIN D3 200MG-6.25
1 TABLET ORAL DAILY
Qty: 100 STRIP | Refills: 3 | Status: SHIPPED | OUTPATIENT
Start: 2025-05-13

## 2025-05-15 ENCOUNTER — OFFICE VISIT (OUTPATIENT)
Dept: CARDIOLOGY | Facility: HOSPITAL | Age: 80
End: 2025-05-15
Payer: MEDICARE

## 2025-05-15 VITALS
SYSTOLIC BLOOD PRESSURE: 131 MMHG | OXYGEN SATURATION: 95 % | DIASTOLIC BLOOD PRESSURE: 68 MMHG | HEART RATE: 63 BPM | WEIGHT: 120.59 LBS | BODY MASS INDEX: 21.36 KG/M2

## 2025-05-15 DIAGNOSIS — E78.5 DYSLIPIDEMIA: ICD-10-CM

## 2025-05-15 DIAGNOSIS — I10 ESSENTIAL HYPERTENSION: Primary | ICD-10-CM

## 2025-05-15 DIAGNOSIS — I25.10 CORONARY ARTERY DISEASE INVOLVING NATIVE CORONARY ARTERY OF NATIVE HEART WITHOUT ANGINA PECTORIS: ICD-10-CM

## 2025-05-15 LAB
ATRIAL RATE: 62 BPM
P AXIS: 29 DEGREES
P OFFSET: 183 MS
P ONSET: 152 MS
PR INTERVAL: 132 MS
Q ONSET: 218 MS
QRS COUNT: 10 BEATS
QRS DURATION: 80 MS
QT INTERVAL: 436 MS
QTC CALCULATION(BAZETT): 442 MS
QTC FREDERICIA: 441 MS
R AXIS: -3 DEGREES
T AXIS: 79 DEGREES
T OFFSET: 436 MS
VENTRICULAR RATE: 62 BPM

## 2025-05-15 PROCEDURE — 3075F SYST BP GE 130 - 139MM HG: CPT | Performed by: INTERNAL MEDICINE

## 2025-05-15 PROCEDURE — 93005 ELECTROCARDIOGRAM TRACING: CPT | Performed by: INTERNAL MEDICINE

## 2025-05-15 PROCEDURE — 1036F TOBACCO NON-USER: CPT | Performed by: INTERNAL MEDICINE

## 2025-05-15 PROCEDURE — G2211 COMPLEX E/M VISIT ADD ON: HCPCS | Performed by: INTERNAL MEDICINE

## 2025-05-15 PROCEDURE — 99213 OFFICE O/P EST LOW 20 MIN: CPT | Performed by: INTERNAL MEDICINE

## 2025-05-15 PROCEDURE — 3078F DIAST BP <80 MM HG: CPT | Performed by: INTERNAL MEDICINE

## 2025-05-15 NOTE — PROGRESS NOTES
"Chief Complaint:   No chief complaint on file.     History Of Present Illness:    Neeta Hendrix is a 79 y.o. female presenting for follow-up.  Overall doing well from a cardiac standpoint.  Denies any progressive angina, worsening dyspnea, palpitations or dizziness.  Compliant with medications.  No easy bruising.     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    Past Surgical History:  She has a past surgical history that includes Other surgical history (11/18/2020); Other surgical history (11/18/2020); CT angio abdomen pelvis w and or wo IV IV contrast (10/28/2020); Mastectomy (Left, 1999); and Breast biopsy (Right).      Social History:  She reports that she quit smoking about 6 years ago. Her smoking use included cigarettes. She started smoking about 11 years ago. She has a 2.5 pack-year smoking history. She has never used smokeless tobacco. She reports current alcohol use. She reports that she does not use drugs.    Family History:  Family History[1]     Allergies:  Gabapentin, Adhesive tape-silicones, and Bloodstop bandage    Outpatient Medications:  Current Outpatient Medications   Medication Instructions    albuterol 90 mcg/actuation inhaler INHALE 2 INHALATIONS BY MOUTH  EVERY 6 HOURS IF NEEDED FOR  SHORTNESS OF BREATH    amLODIPine (NORVASC) 5 mg, oral, Daily    aspirin 81 mg, Daily    BD Luer-Akilah Syringe 3 mL 22 x 1 1/2\" syringe 1 every 2 weeks    blood sugar diagnostic (True Metrix Glucose Test Strip) 1 strip, miscellaneous, Daily    blood-glucose meter " (True Metrix Glucose Meter) misc No dose, route, or frequency recorded.    citalopram (CELEXA) 10 mg, oral, Daily    clopidogrel (PLAVIX) 75 mg, oral, Daily    famotidine (PEPCID) 20 mg, oral, 2 times daily    FreeStyle Test strip Test twice daily as directed    hydrALAZINE (APRESOLINE) 50 mg, oral, 2 times daily    ipratropium-albuteroL (Duo-Neb) 0.5-2.5 mg/3 mL nebulizer solution 3 mL, nebulization, 4 times daily PRN    isosorbide mononitrate ER (IMDUR) 30 mg, oral, Daily    lubricating eye drops ophthalmic solution 1 drop, As needed    magnesium oxide (MAG-OX) 400 mg, Daily    memantine (NAMENDA) 5 mg, oral, 2 times daily    metFORMIN XR (GLUCOPHAGE-XR) 500 mg, oral, 2 times daily (morning and late afternoon)    metoprolol succinate XL (TOPROL-XL) 100 mg, oral, Daily    nitroglycerin (NITROSTAT) 0.4 mg, sublingual, Every 5 min PRN    pantoprazole (ProtoNix) 40 mg EC tablet TAKE TAKE 1 TABLET BY MOUTH  TWICE DAILY FOR 60 DAYS, THEN 1  TABLET ONCE DAILY IN THE MORNING BEFORE MEALS THEREAFTER DO NOT  CRUSH, CHEW, OR SPLIT    Repatha SureClick 140 mg/mL injection INJECT 140 MG (1 ML)  SUBCUTANEOUSLY EVERY 14 DAYS DO  NOT START BEFORE MARCH 22, 2024    sodium chloride (Heron 128) 5 % ophthalmic solution 2 drops    syringe, disposable, (Carepoint Luer Slip Syringe) 1 mL syringe No dose, route, or frequency recorded.       Physical Exam:  Constitutional:       Appearance: Healthy appearance. Not in distress.   Neck:      Vascular: No JVR. JVD normal.   Pulmonary:      Effort: Pulmonary effort is normal.      Breath sounds: Normal breath sounds. No wheezing. No rhonchi. No rales.   Chest:      Chest wall: Not tender to palpatation.   Cardiovascular:      Normal rate. Regular rhythm.      Murmurs: There is no murmur.   Edema:     Peripheral edema absent.   Abdominal:      General: Bowel sounds are normal.      Palpations: Abdomen is soft.      Tenderness: There is no abdominal tenderness.   Musculoskeletal: Normal range of  motion. Skin:     General: Skin is warm and dry.   Neurological:      General: No focal deficit present.      Mental Status: Alert and oriented to person, place and time.           Last Labs:  CBC -  Lab Results   Component Value Date    WBC 10.0 03/24/2025    HGB 13.5 03/24/2025    HCT 40.8 03/24/2025    MCV 90.3 03/24/2025     03/24/2025       CMP -  Lab Results   Component Value Date    CALCIUM 9.1 03/24/2025    PHOS 3.2 12/08/2021    PROT 6.6 03/24/2025    ALBUMIN 4.1 03/24/2025    AST 14 03/24/2025    ALT 11 03/24/2025    ALKPHOS 64 03/24/2025    BILITOT 0.6 03/24/2025       LIPID PANEL -   Lab Results   Component Value Date    CHOL 128 03/24/2025    TRIG 126 03/24/2025    HDL 66 03/24/2025    CHHDL 1.9 03/24/2025    VLDL 24 03/13/2024    NHDL 62 03/24/2025    LDLCALC 41 03/24/2025       RENAL FUNCTION PANEL -   Lab Results   Component Value Date    GLUCOSE 169 (H) 03/24/2025     03/24/2025    K 4.0 03/24/2025    CL 99 03/24/2025    CO2 28 03/24/2025    ANIONGAP 10 03/24/2025    BUN 27 (H) 03/24/2025    CREATININE 0.81 03/24/2025    CALCIUM 9.1 03/24/2025    PHOS 3.2 12/08/2021    ALBUMIN 4.1 03/24/2025        Lab Results   Component Value Date     (H) 10/05/2020    HGBA1C 6.8 (A) 03/18/2025       Last Cardiology Tests:  ECG independently reviewed from today: Sinus rhythm, rate 62     Heart cath 4/24/2023  1. Left main: short/near separate LAD/LCx ostium.  2. LAD: 30% ostial/angulated disease, patent mid-vessel stent.  3. LCx: 95% ostial stenosis, patent proximal-mid vessel stent complex.  4. RCA: 30% proximal ISR.  5. LVEDP 19mmHg, no aortic stenosis on LV-Ao gradient.  6. FFR of LAD = 0.91.  7. Successful PCI to severe ostial LCx stenosis (into LM ostium) with one Abilio Thomas NERIS.        Echo 12/2021:  1. The left ventricular systolic function is normal with a 50-55% estimated ejection fraction.  2. Spectral Doppler shows an impaired relaxation pattern of left ventricular diastolic  filling.  3. Aortic valve sclerosis.     [facility]: 10/30/2020  CONCLUSIONS:  1. Successful PCI to severe proximal LCx stenosis with a 2.75 x 18 mm Resolute  NERIS post-dilated with a 2.75 mm NC balloon at 22 leonila.  2. Successful PCI to severe mid-LAD stenosis with a 2.5 x 34 mm Resolute NERIS  post-dilated distally with a 2.5 mm NC balloon at 22 leonila and proximally with a  2.75 mm NC balloon at 20-24 leonila    Assessment/Plan   Very pleasant 79 year old female with a history of severe multi-vessel CAD with PCI to LCx and LAD in 10/2020, PCI to Lcx in 4/23, carotid stenosis, DM, HTN, HLD and spinal stenosis.       Overall doing well clinically at this time, blood pressure and lipids well-controlled.     Plan   -continue Aspirin 81mg daily indefinitely, current Hydralazine, imdur, repatha, metoprolol and amlodipine   -follow up in one year or sooner if needed       Sonido Escalante MD         [1]   Family History  Problem Relation Name Age of Onset    Breast cancer Mother      No Known Problems Father      No Known Problems Other

## 2025-05-18 DIAGNOSIS — K21.9 GASTROESOPHAGEAL REFLUX DISEASE, UNSPECIFIED WHETHER ESOPHAGITIS PRESENT: ICD-10-CM

## 2025-05-19 DIAGNOSIS — I25.10 CORONARY ARTERY DISEASE INVOLVING NATIVE CORONARY ARTERY OF NATIVE HEART WITHOUT ANGINA PECTORIS: ICD-10-CM

## 2025-05-19 DIAGNOSIS — R11.2 NAUSEA AND VOMITING, UNSPECIFIED VOMITING TYPE: ICD-10-CM

## 2025-05-19 DIAGNOSIS — K21.9 GASTROESOPHAGEAL REFLUX DISEASE, UNSPECIFIED WHETHER ESOPHAGITIS PRESENT: ICD-10-CM

## 2025-05-19 RX ORDER — CLOPIDOGREL BISULFATE 75 MG/1
75 TABLET ORAL DAILY
Qty: 90 TABLET | Refills: 3 | Status: SHIPPED | OUTPATIENT
Start: 2025-05-19 | End: 2026-05-19

## 2025-05-19 RX ORDER — FAMOTIDINE 20 MG/1
20 TABLET, FILM COATED ORAL 2 TIMES DAILY
Qty: 60 TABLET | Refills: 11 | OUTPATIENT
Start: 2025-05-19

## 2025-05-19 RX ORDER — PANTOPRAZOLE SODIUM 40 MG/1
TABLET, DELAYED RELEASE ORAL
Qty: 90 TABLET | Refills: 3 | OUTPATIENT
Start: 2025-05-19

## 2025-05-21 DIAGNOSIS — E11.9 TYPE 2 DIABETES MELLITUS WITHOUT COMPLICATION, UNSPECIFIED WHETHER LONG TERM INSULIN USE: ICD-10-CM

## 2025-05-21 RX ORDER — CALCIUM CITRATE/VITAMIN D3 200MG-6.25
1 TABLET ORAL DAILY
Qty: 100 STRIP | Refills: 3 | Status: SHIPPED | OUTPATIENT
Start: 2025-05-21

## 2025-06-10 ENCOUNTER — TELEPHONE (OUTPATIENT)
Dept: PRIMARY CARE | Facility: CLINIC | Age: 80
End: 2025-06-10
Payer: MEDICARE

## 2025-06-10 NOTE — TELEPHONE ENCOUNTER
Received a fax from [facility] [employer] in [place].     They need a written RX for 1 prosthesis and 4 Mastectomy bra. In order to file a claim for this they need a written RX. Once the RX is written I will fax and file in the patients chart.'    F: [phone]

## 2025-06-10 NOTE — TELEPHONE ENCOUNTER
She had a mastectomy previously, a company will be faxing an order for a prescription for prosthesis and bras.  Will you please call her when it is sent?

## 2025-08-18 DIAGNOSIS — K21.9 GASTROESOPHAGEAL REFLUX DISEASE, UNSPECIFIED WHETHER ESOPHAGITIS PRESENT: ICD-10-CM

## 2025-08-18 DIAGNOSIS — R07.9 CHEST PAIN, UNSPECIFIED TYPE: ICD-10-CM

## 2025-08-18 RX ORDER — FAMOTIDINE 20 MG/1
20 TABLET, FILM COATED ORAL 2 TIMES DAILY
Qty: 60 TABLET | Refills: 11 | OUTPATIENT
Start: 2025-08-18

## 2025-08-19 RX ORDER — ISOSORBIDE MONONITRATE 30 MG/1
30 TABLET, EXTENDED RELEASE ORAL DAILY
Qty: 90 TABLET | Refills: 3 | Status: SHIPPED | OUTPATIENT
Start: 2025-08-19 | End: 2025-08-21 | Stop reason: SDUPTHER

## 2025-08-21 DIAGNOSIS — R07.9 CHEST PAIN, UNSPECIFIED TYPE: ICD-10-CM

## 2025-08-21 RX ORDER — ISOSORBIDE MONONITRATE 30 MG/1
30 TABLET, EXTENDED RELEASE ORAL DAILY
Qty: 90 TABLET | Refills: 3 | Status: SHIPPED | OUTPATIENT
Start: 2025-08-21 | End: 2026-08-21

## 2025-08-25 DIAGNOSIS — E11.9 TYPE 2 DIABETES MELLITUS WITHOUT COMPLICATION, UNSPECIFIED WHETHER LONG TERM INSULIN USE: ICD-10-CM

## 2025-08-25 RX ORDER — METFORMIN HYDROCHLORIDE 500 MG/1
500 TABLET, EXTENDED RELEASE ORAL
Qty: 180 TABLET | Refills: 3 | Status: SHIPPED | OUTPATIENT
Start: 2025-08-25

## 2025-09-17 ENCOUNTER — APPOINTMENT (OUTPATIENT)
Dept: PRIMARY CARE | Facility: CLINIC | Age: 80
End: 2025-09-17
Payer: MEDICARE

## 2025-10-01 ENCOUNTER — APPOINTMENT (OUTPATIENT)
Dept: PULMONOLOGY | Facility: CLINIC | Age: 80
End: 2025-10-01
Payer: MEDICARE